# Patient Record
Sex: MALE | Race: OTHER | HISPANIC OR LATINO | URBAN - METROPOLITAN AREA
[De-identification: names, ages, dates, MRNs, and addresses within clinical notes are randomized per-mention and may not be internally consistent; named-entity substitution may affect disease eponyms.]

---

## 2019-04-04 ENCOUNTER — EMERGENCY (EMERGENCY)
Facility: HOSPITAL | Age: 64
LOS: 1 days | Discharge: HOME | End: 2019-04-04
Attending: EMERGENCY MEDICINE
Payer: SELF-PAY

## 2019-04-04 VITALS
TEMPERATURE: 99 F | RESPIRATION RATE: 20 BRPM | HEART RATE: 94 BPM | HEIGHT: 66.93 IN | DIASTOLIC BLOOD PRESSURE: 89 MMHG | OXYGEN SATURATION: 95 % | WEIGHT: 149.91 LBS | SYSTOLIC BLOOD PRESSURE: 144 MMHG

## 2019-04-04 PROCEDURE — 99283 EMERGENCY DEPT VISIT LOW MDM: CPT | Mod: 25

## 2019-04-04 PROCEDURE — 99053 MED SERV 10PM-8AM 24 HR FAC: CPT

## 2019-04-04 NOTE — ED ADULT TRIAGE NOTE - CHIEF COMPLAINT QUOTE
patient states having cramping type pain to bilat arms legs and back and headache that started 2 months ago but states pain worse tonight

## 2019-04-05 VITALS
HEART RATE: 76 BPM | OXYGEN SATURATION: 96 % | SYSTOLIC BLOOD PRESSURE: 132 MMHG | RESPIRATION RATE: 18 BRPM | TEMPERATURE: 98 F | DIASTOLIC BLOOD PRESSURE: 82 MMHG

## 2019-04-05 PROCEDURE — T1013: CPT

## 2019-04-05 PROCEDURE — 99283 EMERGENCY DEPT VISIT LOW MDM: CPT

## 2019-04-05 RX ORDER — IBUPROFEN 200 MG
600 TABLET ORAL ONCE
Qty: 0 | Refills: 0 | Status: COMPLETED | OUTPATIENT
Start: 2019-04-05 | End: 2019-04-05

## 2019-04-05 RX ORDER — ACETAMINOPHEN 500 MG
975 TABLET ORAL ONCE
Qty: 0 | Refills: 0 | Status: COMPLETED | OUTPATIENT
Start: 2019-04-05 | End: 2019-04-05

## 2019-04-05 RX ADMIN — Medication 600 MILLIGRAM(S): at 02:16

## 2019-04-05 RX ADMIN — Medication 975 MILLIGRAM(S): at 02:16

## 2019-04-05 NOTE — ED ADULT NURSE NOTE - OBJECTIVE STATEMENT
assumed care of pt in room. family at bedside. pt a&ox3. pt reports feeling flu like symptoms for past 2 days. pt reports having body aches and pains for past 2 months in hands, arms, head. cough x2 days. pt reports taking tylenol/motrin at home for pains. has not seen doctor for pains or symptoms. denies nausea, vomiting, diarrhea, fever. had flu shot this year.

## 2019-04-05 NOTE — ED PROVIDER NOTE - ATTENDING CONTRIBUTION TO CARE
63-year-old malePresents to emergency room with complaints of cough congestion, BilateralHand pain patient is a . Plan to check vitals no evidence of fever , Retractions.Most likely viral infection in nature plan to educate patient encourage by mouth fluidsFollow-up with primary care physician.

## 2019-04-05 NOTE — ED PROVIDER NOTE - OBJECTIVE STATEMENT
63 year old male with generalized body aches pressure headache and cough x 2 days as well as bilateral hand pain x 2 months. previously worked in construction. dry cough no cp or sob. nonsmoker. got flu shot this year. minimal relief of symptoms with tylenol and advil at home last taken earlier in the day  denies accidents or injuries recent sick contacts or travel. no hemoptysis.

## 2019-04-05 NOTE — ED PROVIDER NOTE - PROGRESS NOTE DETAILS
educated on flu like symptoms, conservative management and proper fu   as well as medication for arthritis.   given [facility] for fu

## 2019-04-05 NOTE — ED ADULT NURSE NOTE - NSIMPLEMENTINTERV_GEN_ALL_ED
Implemented All Universal Safety Interventions:  [place] to call system. Call bell, personal items and telephone within reach. Instruct patient to call for assistance. Room bathroom lighting operational. Non-slip footwear when patient is off stretcher. Physically safe environment: no spills, clutter or unnecessary equipment. Stretcher in lowest position, wheels locked, appropriate side rails in place.

## 2019-04-05 NOTE — ED PROVIDER NOTE - MUSCULOSKELETAL, MLM
Spine appears normal no midline pt vertebral tenderness or step offs., range of motion is not limited, . BILATERAL HANDS/ Wrists: no apparent deformity swelling or erythema. FROM of all join spaces. sensation intact. good opposition and finger strength 2+ radial pulses. brisk cap refill.

## 2020-11-06 ENCOUNTER — EMERGENCY (EMERGENCY)
Facility: HOSPITAL | Age: 65
LOS: 1 days | Discharge: HOME | End: 2020-11-06
Attending: EMERGENCY MEDICINE
Payer: COMMERCIAL

## 2020-11-06 VITALS
RESPIRATION RATE: 22 BRPM | HEIGHT: 66.93 IN | HEART RATE: 70 BPM | TEMPERATURE: 98 F | DIASTOLIC BLOOD PRESSURE: 101 MMHG | WEIGHT: 151.9 LBS | SYSTOLIC BLOOD PRESSURE: 172 MMHG | OXYGEN SATURATION: 96 %

## 2020-11-06 LAB
ALBUMIN SERPL ELPH-MCNC: 4.2 G/DL — SIGNIFICANT CHANGE UP (ref 3.3–5.2)
ALP SERPL-CCNC: 54 U/L — SIGNIFICANT CHANGE UP (ref 40–120)
ALT FLD-CCNC: 29 U/L — SIGNIFICANT CHANGE UP
ANION GAP SERPL CALC-SCNC: 11 MMOL/L — SIGNIFICANT CHANGE UP (ref 5–17)
APTT BLD: 24.6 SEC — LOW (ref 27.5–35.5)
AST SERPL-CCNC: 20 U/L — SIGNIFICANT CHANGE UP
BILIRUB SERPL-MCNC: 0.7 MG/DL — SIGNIFICANT CHANGE UP (ref 0.4–2)
BLD GP AB SCN SERPL QL: SIGNIFICANT CHANGE UP
BUN SERPL-MCNC: 22 MG/DL — HIGH (ref 8–20)
CALCIUM SERPL-MCNC: 8.9 MG/DL — SIGNIFICANT CHANGE UP (ref 8.6–10.2)
CHLORIDE SERPL-SCNC: 103 MMOL/L — SIGNIFICANT CHANGE UP (ref 98–107)
CO2 SERPL-SCNC: 26 MMOL/L — SIGNIFICANT CHANGE UP (ref 22–29)
CREAT SERPL-MCNC: 0.6 MG/DL — SIGNIFICANT CHANGE UP (ref 0.5–1.3)
GLUCOSE SERPL-MCNC: 116 MG/DL — HIGH (ref 70–99)
HCT VFR BLD CALC: 42.1 % — SIGNIFICANT CHANGE UP (ref 39–50)
HGB BLD-MCNC: 14.8 G/DL — SIGNIFICANT CHANGE UP (ref 13–17)
INR BLD: 0.96 RATIO — SIGNIFICANT CHANGE UP (ref 0.88–1.16)
MCHC RBC-ENTMCNC: 30.8 PG — SIGNIFICANT CHANGE UP (ref 27–34)
MCHC RBC-ENTMCNC: 35.2 GM/DL — SIGNIFICANT CHANGE UP (ref 32–36)
MCV RBC AUTO: 87.5 FL — SIGNIFICANT CHANGE UP (ref 80–100)
PLATELET # BLD AUTO: 165 K/UL — SIGNIFICANT CHANGE UP (ref 150–400)
POTASSIUM SERPL-MCNC: 4 MMOL/L — SIGNIFICANT CHANGE UP (ref 3.5–5.3)
POTASSIUM SERPL-SCNC: 4 MMOL/L — SIGNIFICANT CHANGE UP (ref 3.5–5.3)
PROT SERPL-MCNC: 6.7 G/DL — SIGNIFICANT CHANGE UP (ref 6.6–8.7)
PROTHROM AB SERPL-ACNC: 11.2 SEC — SIGNIFICANT CHANGE UP (ref 10.6–13.6)
RBC # BLD: 4.81 M/UL — SIGNIFICANT CHANGE UP (ref 4.2–5.8)
RBC # FLD: 13.2 % — SIGNIFICANT CHANGE UP (ref 10.3–14.5)
SODIUM SERPL-SCNC: 139 MMOL/L — SIGNIFICANT CHANGE UP (ref 135–145)
WBC # BLD: 11.7 K/UL — HIGH (ref 3.8–10.5)
WBC # FLD AUTO: 11.7 K/UL — HIGH (ref 3.8–10.5)

## 2020-11-06 PROCEDURE — 71046 X-RAY EXAM CHEST 2 VIEWS: CPT | Mod: 26

## 2020-11-06 PROCEDURE — 70450 CT HEAD/BRAIN W/O DYE: CPT | Mod: 26

## 2020-11-06 PROCEDURE — 99218: CPT

## 2020-11-06 PROCEDURE — 99283 EMERGENCY DEPT VISIT LOW MDM: CPT

## 2020-11-06 RX ORDER — AMLODIPINE BESYLATE 2.5 MG/1
5 TABLET ORAL ONCE
Refills: 0 | Status: COMPLETED | OUTPATIENT
Start: 2020-11-06 | End: 2020-11-06

## 2020-11-06 RX ORDER — CYCLOBENZAPRINE HYDROCHLORIDE 10 MG/1
10 TABLET, FILM COATED ORAL ONCE
Refills: 0 | Status: COMPLETED | OUTPATIENT
Start: 2020-11-06 | End: 2020-11-06

## 2020-11-06 RX ORDER — ACETAMINOPHEN 500 MG
650 TABLET ORAL EVERY 6 HOURS
Refills: 0 | Status: DISCONTINUED | OUTPATIENT
Start: 2020-11-06 | End: 2020-11-11

## 2020-11-06 RX ADMIN — CYCLOBENZAPRINE HYDROCHLORIDE 10 MILLIGRAM(S): 10 TABLET, FILM COATED ORAL at 23:05

## 2020-11-06 RX ADMIN — AMLODIPINE BESYLATE 5 MILLIGRAM(S): 2.5 TABLET ORAL at 22:52
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
## 2020-11-06 NOTE — ED PROVIDER NOTE - PROGRESS NOTE DETAILS
Accompanied by  Kady, Pt was given his CT results. It was explained to the a patient the 7mm lesion may be consistent with a cavernoma, benign lesion, hemorrhage, or metastatic lesion. Results were discussed with the son in the room with the approval of the pt. Pt agreed they woukld like to stay for the MRI.
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
## 2020-11-07 PROBLEM — Z78.9 OTHER SPECIFIED HEALTH STATUS: Chronic | Status: ACTIVE | Noted: 2019-04-05

## 2020-11-07 PROCEDURE — 70498 CT ANGIOGRAPHY NECK: CPT | Mod: 26

## 2020-11-07 PROCEDURE — 99226: CPT

## 2020-11-07 PROCEDURE — 99283 EMERGENCY DEPT VISIT LOW MDM: CPT

## 2020-11-07 PROCEDURE — 70496 CT ANGIOGRAPHY HEAD: CPT | Mod: 26

## 2020-11-07 PROCEDURE — 70551 MRI BRAIN STEM W/O DYE: CPT | Mod: 26

## 2020-11-07 RX ORDER — AMLODIPINE BESYLATE 2.5 MG/1
5 TABLET ORAL
Refills: 0 | Status: DISCONTINUED | OUTPATIENT
Start: 2020-11-07 | End: 2020-11-11

## 2020-11-07 RX ADMIN — Medication 650 MILLIGRAM(S): at 21:34

## 2020-11-07 RX ADMIN — Medication 650 MILLIGRAM(S): at 23:19
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
## 2020-11-07 NOTE — CONSULT NOTE ADULT - ATTENDING COMMENTS
NSGY Attg:    see above    patient seen and examined by PA staff    CT and MRI reviewed    suspect likely cavernoma, although official report of MRI pending    agree with plan as above
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
## 2020-11-08 VITALS
HEART RATE: 72 BPM | DIASTOLIC BLOOD PRESSURE: 82 MMHG | RESPIRATION RATE: 18 BRPM | OXYGEN SATURATION: 97 % | SYSTOLIC BLOOD PRESSURE: 134 MMHG | TEMPERATURE: 98 F

## 2020-11-08 PROCEDURE — 86850 RBC ANTIBODY SCREEN: CPT

## 2020-11-08 PROCEDURE — 70551 MRI BRAIN STEM W/O DYE: CPT

## 2020-11-08 PROCEDURE — 70553 MRI BRAIN STEM W/O & W/DYE: CPT

## 2020-11-08 PROCEDURE — 85027 COMPLETE CBC AUTOMATED: CPT

## 2020-11-08 PROCEDURE — T1013: CPT

## 2020-11-08 PROCEDURE — 70553 MRI BRAIN STEM W/O & W/DYE: CPT | Mod: 26

## 2020-11-08 PROCEDURE — 36415 COLL VENOUS BLD VENIPUNCTURE: CPT

## 2020-11-08 PROCEDURE — 71046 X-RAY EXAM CHEST 2 VIEWS: CPT

## 2020-11-08 PROCEDURE — G0378: CPT

## 2020-11-08 PROCEDURE — 99284 EMERGENCY DEPT VISIT MOD MDM: CPT

## 2020-11-08 PROCEDURE — 99217: CPT

## 2020-11-08 PROCEDURE — 80053 COMPREHEN METABOLIC PANEL: CPT

## 2020-11-08 PROCEDURE — 70450 CT HEAD/BRAIN W/O DYE: CPT

## 2020-11-08 PROCEDURE — 85730 THROMBOPLASTIN TIME PARTIAL: CPT

## 2020-11-08 PROCEDURE — 99282 EMERGENCY DEPT VISIT SF MDM: CPT

## 2020-11-08 PROCEDURE — 86901 BLOOD TYPING SEROLOGIC RH(D): CPT

## 2020-11-08 PROCEDURE — 70498 CT ANGIOGRAPHY NECK: CPT

## 2020-11-08 PROCEDURE — 86900 BLOOD TYPING SEROLOGIC ABO: CPT

## 2020-11-08 PROCEDURE — 85610 PROTHROMBIN TIME: CPT

## 2020-11-08 PROCEDURE — 70496 CT ANGIOGRAPHY HEAD: CPT

## 2020-11-08 RX ORDER — AMLODIPINE BESYLATE 2.5 MG/1
1 TABLET ORAL
Qty: 30 | Refills: 0
Start: 2020-11-08 | End: 2020-12-07

## 2020-11-08 RX ADMIN — Medication 650 MILLIGRAM(S): at 16:56

## 2020-11-08 RX ADMIN — Medication 650 MILLIGRAM(S): at 18:17
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
## 2020-11-08 NOTE — ED ADULT NURSE REASSESSMENT NOTE - NEURO MENTATION
-- DO NOT REPLY / DO NOT REPLY ALL --  -- Message is from the [facility]--    COVID-19 Universal Screening: Negative    General Patient Message      Reason for Call: The patient is calling for Dr. Harvey, in regards to her legs still being swollen. The patient would like a call back as soon as possible.    Caller Information       Type Contact Phone    04/10/2020 08:14 AM Phone (Incoming) Ailin Montilla (Self) [phone] (M)          Alternative phone number: none    Turnaround time given to caller:   \"This message will be sent to [state Provider's name]. The clinical team will fulfill your request as soon as they review your message.\"    
Patient states her legs are still swollen. Noted she had a  visit on 3/23, but \"provider didn't state much\" per patient. She wishes to speak with  to address her concerns, telephone visit scheduled.   
normal
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
## 2020-11-08 NOTE — ED ADULT NURSE REASSESSMENT NOTE - NIH STROKE SCALE: 1B. LOC QUESTIONS, QM
(0) Answers both questions correctly

## 2020-11-08 NOTE — ED ADULT NURSE REASSESSMENT NOTE - NIH STROKE SCALE: 6B. MOTOR LEG, RIGHT, QM
(0) No drift; leg holds 30 degree position for full 5 secs
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
## 2020-11-08 NOTE — ED CDU PROVIDER SUBSEQUENT DAY NOTE - HEME LYMPH, MLM
No adenopathy or splenomegaly. No cervical or inguinal lymphadenopathy.
No adenopathy or splenomegaly. No cervical or inguinal lymphadenopathy.

## 2020-11-08 NOTE — ED CDU PROVIDER SUBSEQUENT DAY NOTE - MUSCULOSKELETAL, MLM
Spine appears normal, range of motion is not limited, no muscle or joint tenderness
Spine appears normal, No mid line spine at C/T/L not TTP , ROm of the cervical grossly intact

## 2020-11-08 NOTE — ED ADULT NURSE REASSESSMENT NOTE - COMFORT CARE
wait time explained/plan of care explained/ambulated to bathroom/po fluids offered/meal provided
wait time explained/plan of care explained/po fluids offered/meal provided/ambulated to bathroom
wait time explained/po fluids offered/meal provided/plan of care explained
wait time explained/ambulated to bathroom/plan of care explained/po fluids offered

## 2020-11-08 NOTE — ED ADULT NURSE REASSESSMENT NOTE - NIH STROKE SCALE: 5A. MOTOR ARM, LEFT, QM
(0) No drift; limb holds 90 (or 45) degrees for full 10 secs

## 2020-11-08 NOTE — ED CDU PROVIDER SUBSEQUENT DAY NOTE - PSYCHIATRIC, MLM
Alert and oriented to person, place, time/situation. normal mood and affect. no apparent risk to self or others.
Alert and oriented to person, place, time/situation. normal mood and affect. no apparent risk to self or others.

## 2020-11-08 NOTE — ED ADULT NURSE REASSESSMENT NOTE - NS ED NURSE REASSESS COMMENT FT1
Patient recd this morning A/Ox3, VSS, denies any sort of pain, discussed plan of care with patient, will continue to monitor.
Pt sleeping comfortably in bed will continue to monitor.
pt resting comfortably in stretcher with no complaints at this time.  Safety maintained, POC explained, pt verbalized understanding.
Received report from GI Gray. Assumed care of pt at 1030 hours. Pt moved to CDU for observation.
Assumed care of the Pt at 1930. Verbal report received from GI Marsh. Pt resting comfortably in bed in AOX4 in no acute distress.  called to bedside for assistance. Pt denies chest pain and SOB. Pt complaining of headache, Meds to be given as per orders. Pt with no neurological deficits NIH of 0. Pt pending MRI. Pt with no further questions for RN. Wait time explained plan of care explained will continue to monitor.

## 2020-11-08 NOTE — ED CDU PROVIDER DISPOSITION NOTE - ATTENDING CONTRIBUTION TO CARE
MR does not reveal any acute findings of clinical significane  safe to discharge with outpt follow up
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
## 2020-11-08 NOTE — ED ADULT NURSE REASSESSMENT NOTE - NIH STROKE SCALE: 3. VISUAL, QM
(0) No visual loss

## 2020-11-08 NOTE — PROGRESS NOTE ADULT - ATTENDING COMMENTS
NSGY Attg:    see above    patient seen and examined    agree with exam as above    MRI reviewed and case d/w Dr. Rollins    no acute neurosurgical intervention and no role for angio

## 2020-11-08 NOTE — ED ADULT NURSE REASSESSMENT NOTE - ANCILLARY STATUS
MRI/awaiting radiology
MRI/awaiting radiology
physician at bedside/ and MD at bedside updating patient and family member of plan of care  / answering all questions and concerns to patient's satisfaction/awaiting radiology
awaiting radiology/MRI

## 2020-11-08 NOTE — ED ADULT NURSE REASSESSMENT NOTE - GENERAL PATIENT STATE
comfortable appearance/cooperative/resting/sleeping/smiling/interactive
comfortable appearance/cooperative/resting/sleeping/smiling/interactive
resting/sleeping/family/SO at bedside/smiling/interactive/comfortable appearance/cooperative
smiling/interactive/comfortable appearance/cooperative

## 2020-11-08 NOTE — ED CDU PROVIDER DISPOSITION NOTE - PATIENT PORTAL LINK FT
You can access the FollowMyHealth Patient Portal offered by [facility] by registering at the following website: [email or web address]. By joining [facility]’s FollowMyHealth portal, you will also be able to view your health information using other applications (apps) compatible with our system.

## 2020-11-08 NOTE — ED ADULT NURSE REASSESSMENT NOTE - NIH STROKE SCALE: 8. SENSORY, QM
(0) Normal; no sensory loss

## 2020-11-08 NOTE — ED ADULT NURSE REASSESSMENT NOTE - NSIMPLEMENTINTERV_GEN_ALL_ED
Implemented All Universal Safety Interventions:  [place] to call system. Call bell, personal items and telephone within reach. Instruct patient to call for assistance. Room bathroom lighting operational. Non-slip footwear when patient is off stretcher. Physically safe environment: no spills, clutter or unnecessary equipment. Stretcher in lowest position, wheels locked, appropriate side rails in place.
Implemented All Universal Safety Interventions:  [place] to call system. Call bell, personal items and telephone within reach. Instruct patient to call for assistance. Room bathroom lighting operational. Non-slip footwear when patient is off stretcher. Physically safe environment: no spills, clutter or unnecessary equipment. Stretcher in lowest position, wheels locked, appropriate side rails in place.

## 2020-11-08 NOTE — ED ADULT NURSE REASSESSMENT NOTE - NIH STROKE SCALE: 4. FACIAL PALSY, QM
(0) Normal symmetrical movements

## 2020-11-08 NOTE — ED ADULT NURSE REASSESSMENT NOTE - NIH STROKE SCALE: 1A. LEVEL OF CONSCIOUSNESS, QM
(0) Alert; keenly responsive
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
## 2020-11-08 NOTE — ED ADULT NURSE REASSESSMENT NOTE - NIH STROKE SCALE: 1C. LOC COMMANDS, QM
(0) Performs both tasks correctly

## 2020-11-08 NOTE — ED ADULT NURSE REASSESSMENT NOTE - NURSING NEURO LEVEL OF CONSCIOUSNESS
alert and awake/follows commands
alert and awake/follows commands
follows commands/alert and awake
follows commands/alert and awake
alert and awake/follows commands
follows commands/alert and awake

## 2021-10-24 ENCOUNTER — EMERGENCY (EMERGENCY)
Facility: HOSPITAL | Age: 66
LOS: 1 days | Discharge: HOME | End: 2021-10-24
Attending: EMERGENCY MEDICINE
Payer: SELF-PAY

## 2021-10-24 VITALS
DIASTOLIC BLOOD PRESSURE: 92 MMHG | OXYGEN SATURATION: 96 % | SYSTOLIC BLOOD PRESSURE: 151 MMHG | RESPIRATION RATE: 18 BRPM | HEART RATE: 77 BPM

## 2021-10-24 VITALS
OXYGEN SATURATION: 97 % | TEMPERATURE: 98 F | DIASTOLIC BLOOD PRESSURE: 94 MMHG | SYSTOLIC BLOOD PRESSURE: 154 MMHG | WEIGHT: 164.91 LBS | HEART RATE: 75 BPM | RESPIRATION RATE: 16 BRPM | HEIGHT: 66.93 IN

## 2021-10-24 PROCEDURE — 99283 EMERGENCY DEPT VISIT LOW MDM: CPT | Mod: 25

## 2021-10-24 PROCEDURE — 99284 EMERGENCY DEPT VISIT MOD MDM: CPT

## 2021-10-24 PROCEDURE — 93010 ELECTROCARDIOGRAM REPORT: CPT

## 2021-10-24 PROCEDURE — 71045 X-RAY EXAM CHEST 1 VIEW: CPT

## 2021-10-24 PROCEDURE — 71045 X-RAY EXAM CHEST 1 VIEW: CPT | Mod: 26

## 2021-10-24 PROCEDURE — 93005 ELECTROCARDIOGRAM TRACING: CPT

## 2021-10-24 RX ORDER — ACETAMINOPHEN 500 MG
975 TABLET ORAL ONCE
Refills: 0 | Status: COMPLETED | OUTPATIENT
Start: 2021-10-24 | End: 2021-10-24

## 2021-10-24 RX ADMIN — Medication 975 MILLIGRAM(S): at 18:22
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
## 2021-10-24 NOTE — ED PROVIDER NOTE - PROGRESS NOTE DETAILS
[place] ECHEVARRIA: patient feeling better after tylenol, currently asymptomatic. Does not want to wait for CT. Currently FROM of neck, no cervical ttp. CT cancelled. Return precautions given.
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
## 2021-10-24 NOTE — ED PROVIDER NOTE - PATIENT PORTAL LINK FT
You can access the FollowMyHealth Patient Portal offered by [facility] by registering at the following website: [email or web address]. By joining [facility]’s FollowMyHealth portal, you will also be able to view your health information using other applications (apps) compatible with our system.

## 2021-10-24 NOTE — ED PROVIDER NOTE - PHYSICAL EXAMINATION
Const: Awake, alert and oriented. In no acute distress. Well appearing.  HEENT: NC/AT. Moist mucous membranes.  Eyes: No scleral icterus. EOMI.  Neck:. Cervical spine tenderness to palpation. C-collar in place.   Cardiac: Regular rate and regular rhythm. +S1/S2. Peripheral pulses 2+ and symmetric. No LE edema.  Resp: Speaking in full sentences. No evidence of respiratory distress. No wheezes, rales or rhonchi.  Abd: Soft, non-tender, non-distended. Normal bowel sounds in all 4 quadrants. No guarding or rebound.  Back: Spine midline and non-tender. No CVAT.  Skin: No rashes, abrasions or lacerations.  Lymph: No cervical lymphadenopathy.  Neuro: Awake, alert & oriented x 3. Moves all extremities symmetrically.
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
## 2023-06-07 NOTE — ED PROVIDER NOTE - PMH
No pertinent past medical history <<----- Click to add NO pertinent Past Medical History Hatchet Flap Text: The defect edges were debeveled with a #15 scalpel blade. Given the location of the defect, shape of the defect and the proximity to free margins a hatchet flap was deemed most appropriate. Using a sterile surgical marker, an appropriate hatchet flap was drawn incorporating the defect and placing the expected incisions within the relaxed skin tension lines where possible. The area thus outlined was incised deep to adipose tissue with a #15 scalpel blade. The skin margins were undermined to an appropriate distance in all directions utilizing iris scissors. Following this, the designed flap was carried over into the primary defect and sutured into place.

## 2023-07-03 NOTE — ED CDU PROVIDER DISPOSITION NOTE - NSFOLLOWUPINSTRUCTIONS_ED_ALL_ED_FT
Addended by: GAVIN FOURNIER on: 7/3/2023 02:43 PM     Modules accepted: Orders     Please follow up with neurology

## 2023-08-23 NOTE — ED ADULT NURSE REASSESSMENT NOTE - NURSING NEURO ORIENTATION
oriented to person, place and time
Two to three times per week

## 2023-08-30 NOTE — ED ADULT NURSE REASSESSMENT NOTE - NSFALLRSKOUTCOME_ED_ALL_ED
OUTPATIENT PSYCHIATRY FOLLOW-UP VISIT     PATIENT:  Laura Cedeno  MRN:  [id number]  :  1946  DATE OF SERVICE:  2023    Pt last seen 23, during which the following was done:  -continue Pristiq 150mg PO QAM for mood/anxiety  -continue gabapentin 300mg PO Qday PRN anxiety, insomnia  -continue trazodone 50-100mg PO QHS for sleep    SUBJECTIVE  Since last visit, patient states she had trouble sleeping last night and is feeling fatigued, generally sleeps well but today woke up very early. Reports her mood is \"blah.\" Going to visit daughter in [place] tomorrow and has to pack. Decided to increase vitamin D to 2000 IU since her recent labs showed slightly low vitamin D. Remains adherent with Pristiq, gabapentin, trazodone with no noted adverse effects. Psychiatric hx, family psychiatric hx, social hx, and substance use hx reviewed and updated if necessary.      Psychiatric Hx:  Diagnoses: depression, anxiety  Treatment: last outpatient provider was Dr. 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 
100 MG tablet TAKE 1/2 TO 1 TABLET       NIGHTLY   • ezetimibe (ZETIA) 10 MG tablet TAKE 1 TABLET AT BEDTIME   • desvenlafaxine (PRISTIQ) 50 MG 24 hr tablet Take 1 tablet by mouth daily. (with 100mg tablet for a total daily dose of 150mg)   • busPIRone (BUSPAR) 10 MG tablet Take 0.5 tablet twice daily for 7 days, then increase to 1 tablet twice daily.   • desvenlafaxine 50 MG extended-release tablet Take 1 tablet by mouth daily.   • amLODIPine (NORVASC) 2.5 MG tablet Take 1 tablet by mouth at bedtime. Take 5 mg AM and 2.5 mg PM   • amLODIPine (NORVASC) 5 MG tablet Take 5 mg AM   • desvenlafaxine (PRISTIQ) 100 MG 24 hr tablet Take 200 mg by mouth daily.   • atorvastatin (LIPITOR) 10 MG tablet TAKE 1 TABLET DAILY   • gabapentin (NEURONTIN) 300 MG capsule Take 1 tablet twice a day as needed for anxiety, insomnia.   • losartan (COZAAR) 50 MG tablet TAKE 1 TABLET TWICE A DAY   • sumatriptan (IMITREX) 100 MG tablet Take 1 tablet by mouth as needed for Migraine. Take 1 tablet by mouth at onset of migraine. May repeat after 2 hours if needed.   • aspirin (Aspirin Childrens) 81 MG chewable tablet Chew 1 tablet by mouth daily.   • Calcium Carbonate Antacid (CALCIUM CARBONATE PO) Take 1 tablet by mouth daily.   • ZINC SULFATE PO Take by mouth as needed.   • SUMAtriptan (IMITREX STATDOSE) 6 MG/0.5ML auto-injector INJECT 0.5ML SUBCUTANEOUSLYAS NEEDED FOR MIGRAINE   • Multiple Vitamins-Minerals (MULTIVITAL) tablet Take by mouth daily.   • cholecalciferol (VITAMIN D3) 1000 UNITS tablet Take 1,000 Units by mouth daily.     No current facility-administered medications for this visit.        Allergies:  ALLERGIES:   Allergen Reactions   • Codeine CARDIAC DISTURBANCES and SHORTNESS OF BREATH   • No Name Available Other (See Comments)     Codeine Sulfate TABS  got shallow breathing, no rash, no er visit and no trouble breathing  states breathing affected       MENTAL STATUS EXAM  Appearance: Appears stated age, dressed in casual 
clothes, good grooming/hygiene.  Behavior: Cooperative, good eye contact. Mild psychomotor agitation, fidgety.  Speech: Regular rate and rhythm, normal tone, non-pressured.  Mood: \"Blah\"  Affect: Euthymic, normal range, non-labile.  Thought Process: Linear, logical, goal-directed.  Thought Content: Denies SI/HI/obsessions/paranoid or delusion ideation. No evidence of response to internal to stimuli. Denies AVH.  General Cognition: A&Ox3, good concentration, memory intact.  Insight/Judgment: Good/good      ASSESSMENT  Laura Cedeno is a 77 year old female with PMHx significant for HTN, migraines and PPHx significant for anxiety and depression contacted for routine follow up and medication management. Biologically, patient reports family psychiatric history of likely undiagnosed depression in parents and depression and daughter. Psychologically, patient initially endorsed increased symptoms of anxiety and depression including ruminative thoughts, decreased energy and amotivation. Socially, pt with two children that are supportive, retired in September 2019 from working at the [employer], now working a  part-time. No acute safety concerns at this time. Plan is to increase Pristiq dosage for lingering depressive symptoms, pt in agreement with plan.      Diagnosis:  MDD, recurrent, in partial remission  NIDIA    PLAN  -increase Pristiq 150mg to 200mg PO QAM for mood/anxiety  -continue gabapentin 300mg PO Qday PRN anxiety, insomnia  -continue trazodone 50-100mg PO QHS for sleep  -discussed the benefits/risks/alternatives/side effects of treatment plan with patient  -RTC in 4-6 weeks, or before if needed    Светлана Armas MD  [facility]      
Universal Safety Interventions
Universal Safety Interventions

## 2023-09-19 NOTE — ED CDU PROVIDER INITIAL DAY NOTE - LANGUAGE ASSISTANCE NEEDED
Pt will perform toilet transfers with rolling walker/commode independently in 1 week Yes-Patient/Caregiver accepts free interpretation services...

## 2023-10-08 NOTE — ED ADULT NURSE REASSESSMENT NOTE - URINE CHARACTERISTICS
Wt Readings from Last 3 Encounters:   10/07/23 91.2 kg (201 lb 1 oz)   06/19/23 86.6 kg (191 lb)   05/23/23 97.2 kg (214 lb 3.2 oz)     · On admission she had worsening bilateral lower extremity edema and edema in right upper extremity  · RUE doppler negative for DVT   · BNP slightly elevated at 107  · CTA chest negative for PE or dissection, chest x-ray revealing pulmonary vascular congestion  · Echocardiogram from April 2023 revealing 60 to 65% ejection fraction with grade 1 diastolic dysfunction  · She was initially started on IV Lasix 100 mg 3 times daily on admission per CHF powerplan -- had a increase in Cr so diuresis held   · Cardiology following   · Hold diuresis she appears euvolemic to dry on exam (giving additional  500 cc fluids 2/2 CLAUDIA)
clear
clear

## 2024-02-23 NOTE — ED CDU PROVIDER SUBSEQUENT DAY NOTE - TEMPLATE, MLM
Patient presented to unit for 2 units PRBCs. VSS. No complaints voiced. Pre-medications refused by patient. PRBCs infused and tolerated without difficulty. Patient accompanied by , ambulatory and in NAD at time of discharge.    Problem: Anemia  Goal: Anemia Symptom Improvement  Outcome: Ongoing, Progressing      Ankle Injury Bites

## 2024-06-05 NOTE — ED ADULT NURSE NOTE - PRO INTERPRETER NEED 2
Left voicemail informing patient that orders had been faxed to [facility].     AGUEDA Irizarry RN     Iranian

## 2024-07-16 ENCOUNTER — EMERGENCY (EMERGENCY)
Facility: HOSPITAL | Age: 69
LOS: 1 days | Discharge: HOME | End: 2024-07-16
Attending: EMERGENCY MEDICINE
Payer: MEDICAID

## 2024-07-16 VITALS
SYSTOLIC BLOOD PRESSURE: 130 MMHG | OXYGEN SATURATION: 96 % | WEIGHT: 150.13 LBS | RESPIRATION RATE: 20 BRPM | DIASTOLIC BLOOD PRESSURE: 74 MMHG | HEART RATE: 85 BPM | TEMPERATURE: 98 F

## 2024-07-16 PROCEDURE — 71250 CT THORAX DX C-: CPT | Mod: MC

## 2024-07-16 PROCEDURE — 99284 EMERGENCY DEPT VISIT MOD MDM: CPT

## 2024-07-16 PROCEDURE — T1013: CPT

## 2024-07-16 PROCEDURE — 71046 X-RAY EXAM CHEST 2 VIEWS: CPT

## 2024-07-16 PROCEDURE — 94640 AIRWAY INHALATION TREATMENT: CPT

## 2024-07-16 PROCEDURE — 71046 X-RAY EXAM CHEST 2 VIEWS: CPT | Mod: 26

## 2024-07-16 PROCEDURE — 99284 EMERGENCY DEPT VISIT MOD MDM: CPT | Mod: 25

## 2024-07-16 RX ORDER — ALBUTEROL 90 MCG
2 AEROSOL REFILL (GRAM) INHALATION EVERY 6 HOURS
Refills: 0 | Status: DISCONTINUED | OUTPATIENT
Start: 2024-07-16 | End: 2024-07-24

## 2024-07-16 RX ORDER — ACETAMINOPHEN 325 MG
650 TABLET ORAL ONCE
Refills: 0 | Status: COMPLETED | OUTPATIENT
Start: 2024-07-16 | End: 2024-07-16

## 2024-07-16 RX ORDER — BENZONATATE 100 MG/1
100 TABLET ORAL ONCE
Refills: 0 | Status: COMPLETED | OUTPATIENT
Start: 2024-07-16 | End: 2024-07-16

## 2024-07-16 RX ADMIN — Medication 650 MILLIGRAM(S): at 20:58

## 2024-07-16 RX ADMIN — Medication 600 MILLIGRAM(S): at 20:58

## 2024-07-16 RX ADMIN — Medication 2 PUFF(S): at 20:58

## 2024-07-16 RX ADMIN — BENZONATATE 100 MILLIGRAM(S): 100 TABLET ORAL at 20:58

## 2024-07-16 NOTE — ED PROVIDER NOTE - PHYSICAL EXAMINATION
General: In NAD, non-toxic/well-appearing.  Skin: No rashes or lesions. Warm, dry, color normal for race.   Head: Normocephalic/atraumatic.   Eyes: Sclera anicteric, conjunctivae clear b/l. PERRLA, EOMI.   Neck: Supple, FROM.   Cardio: Rate and rhythm regular. No audible murmur.  Resp: Speaking in full sentences. No visible nasal flaring, retractions, or deformity. Breath sounds vesicular, symmetrical and without rales, rhonchi or wheezing b/l.  Abd: Non-distended. Soft, non-tender. No rebound, guarding.   MSK: MAEx4. FROM.   Neuro: A&Ox3. Appears nonfocal.

## 2024-07-16 NOTE — ED PROVIDER NOTE - NSFOLLOWUPINSTRUCTIONS_ED_ALL_ED_FT
- Prescription sent to pharmacy.  - Ibuprofen 600mg every 6 hours as needed for pain.  - Acetaminophen 650mg every 6 hours as needed for pain.  - Please bring all documentation from your ED visit to any related future follow up appointment.  - Please call to schedule follow up appointment with your primary care physician within 24-48 hours.  - Please seek immediate medical attention or return to the ED for any new/worsening, signs/symptoms, or concerns.    Feel better!     - Receta enviada a farmacia.  - Ibuprofeno 600mg cada 6 horas según necesidad para el dolor.  - Acetaminofén 650 mg cada 6 horas según sea necesario para el dolor.  - Traiga toda la documentación de marquez visita al ED a cualquier faye de seguimiento futura relacionada.  - Llame para programar magno faye de seguimiento con marquez médico de atención primaria dentro de las 24 a 48 horas.  - Busque atención médica inmediata o regrese al servicio de urgencias por cualquier signo/síntoma o inquietud nuevos/empeorados.    ¡Sentirse mejor!        Enfermedades virales en los adultos    Viral Illness, Adult      Los virus son microbios diminutos que entran en el organismo de magno persona y causan enfermedades. Hay muchos tipos de virus diferentes y causan muchas clases de enfermedades. Las enfermedades virales pueden ser leves o graves. Pueden afectar diferentes partes del cuerpo.    Entre las afecciones a corto plazo causadas por virus, se incluyen los resfríos y la gripe. Entre las afecciones a stefan plazo causadas por un virus, incluyen el herpes, la culebrilla y la infección por VIH (virus de inmunodeficiencia humana). Se conley identificado unos pocos virus asociados con determinados tipos de cáncer.      ¿Cuáles son las causas?    Muchos tipos de virus pueden causar enfermedades. Los virus invaden las células del organismo, se multiplican y provocan que las células infectadas funcionen de manera anormal o mueran. Cuando estas células mueren, liberan más virus. Cuando esto ocurre, aparecen síntomas de la enfermedad, y el virus sigue diseminándose a otras células. Si el virus asume la función de la célula, puede hacer que esta se divida y prolifere de manera descontrolada. Dunlevy ocurre cuando un virus causa cáncer.    Los diferentes virus ingresan al organismo de distintas formas. Puede contraer un virus de la siguiente manera:  •Al ingerir alimentos o beber agua que conley entrado en contacto con el virus (están contaminados).      •Al inhalar gotitas que magno persona infectada liberó en el aire al toser o estornudar.      •Al tocar magno superficie contaminada con el virus y luego llevarse la mano a la boca, la nariz o los ojos.      •Al ser rommel por un insecto o mordido por un animal que son portadores del virus.      •Al tener contacto sexual con magno persona infectada por el virus.      •Al tener contacto con yolande o líquidos que contienen el virus, ya sea a través de un ce abierto o sterling magno transfusión.      Si el virus ingresa al organismo, el sistema de defensa del cuerpo (sistema inmunitario) intentará combatirlo. Puede correr un riesgo más alto de tener magno enfermedad viral si tiene el sistema inmunitario debilitado.      ¿Cuáles son los signos o síntomas?    Puede tener los siguientes síntomas, dependiendo del tipo de virus y de la ubicación de las células que invade:•Virus del resfrío y de la gripe:  •Fiebre.      •Dolor de missael.      •Dolor de garganta.      •Shari musculares.      •Nariz tapada (congestión nasal).      •Tos.      •Virus del aparato digestivo (gastrointestinales):  •Fiebre.      •Dolor en el abdomen.      •Náuseas.      •Diarrea.      •Virus hepáticos (hepatitis):  •Pérdida del apetito.      •Cansancio.      •La piel o las partes pat de los ojos se ponen terri (ictericia).      •Virus del cerebro y la médula matthew:  •Fiebre.      •Dolor de missael.      •Rigidez en el diego.      •Náuseas y vómitos.      •Confusión o somnolencia.      •Virus de la piel:  •Verrugas.      •Picazón.      •Erupción cutánea.      •Virus de transmisión sexual:  •Secreción.      •Hinchazón.      •Enrojecimiento.      •Erupción cutánea.          ¿Cómo se diagnostica?    Esta afección se puede diagnosticar en función de lo siguiente:  •Síntomas.      •Antecedentes médicos.      •Examen físico.      •Análisis de yolande, magno muestra de mucosidad de los pulmones (muestra de esputo), muestra de heces o un hisopado de líquidos corporales o magno llaga de la piel (lesión).        ¿Cómo se trata?    Los virus pueden ser difíciles de tratar porque se hospedan en las células. Los antibióticos no tratan los virus porque estos medicamentos no llegan al interior de las células. El tratamiento de magno enfermedad viral puede incluir lo siguiente:  •Descansar y beber abundantes líquidos.      •Medicamentos para aliviar los síntomas. Estos pueden incluir medicamentos de venta karson para el dolor y la fiebre, medicamentos para la tos o la congestión y medicamentos para aliviar la diarrea.      •Medicamentos antivirales. Estos medicamentos están disponibles únicamente para ciertos tipos de virus.      Algunas enfermedades virales pueden evitarse con vacunas. Un ejemplo frecuente es la vacuna antigripal.      Siga estas instrucciones en marquez casa:    Medicamentos     •Use los medicamentos de venta karson y los recetados solamente vinod se lo haya indicado el médico.      •Si le recetaron un medicamento antiviral, tómelo vinod se lo haya indicado el médico. No deje de radha el antiviral aunque comience a sentirse mejor.    •Infórmese sobre cuándo los antibióticos son necesarios y cuándo no. Los antibióticos no combaten a los virus. Si tiene magno infección viral y el médico laci que también tiene magno infección bacteriana, o que está en riesgo de contraerla, anahi vez le recete un antibiótico.  •No solicite magno receta para antibióticos si le conley diagnosticado magno enfermedad viral. Los antibióticos no harán que se cure más rápidamente.      •Radha antibióticos con frecuencia cuando no son necesarios puede derivar en resistencia a los antibióticos. Cuando esto ocurre, el medicamento pierde marquez eficacia contra las bacterias que normalmente combate.          Indicaciones generales      •Janeth suficiente líquido para mantener la orina de color amarillo pálido.      •Descanse todo lo que pueda.      •Retome patricia actividades normales según lo indicado por el médico. Pregúntele al médico qué actividades son seguras para usted.      •Concurra a todas las visitas de seguimiento vinod se lo haya indicado el médico. Dunlevy es importante.        ¿Cómo se previene?     Para reducir el riesgo de tener magno enfermedad viral:  •Lávese las alba frecuentemente con agua y jabón sterling al menos 20 segundos. Use desinfectante para alba si no dispone de agua y jabón.      •Evite tocarse la nariz, los ojos y la boca, sobre todo si no se lavó las alba recientemente.      •Si un miembro de la hilario tiene magno infección viral, limpie todas las superficies de la casa que puedan kyrie estado en contacto con el virus. Use [place] y jabón. También puede usar lejía con agua agregada (diluido).      •Manténgase lejos de las personas enfermas con síntomas de magno infección viral.      •No comparta objetos tales vinod cepillos de dientes y botellas de agua con otras personas.      •Mantenga las vacunas al día. Dunlevy incluye recibir la vacuna antigripal todos los años.      •Siga magno dieta saludable y descanse lo suficiente.        Comuníquese con un médico si:    •Tiene síntomas de magno enfermedad viral que no desaparecen.      •Los síntomas regresan después de kyrie desaparecido.      •Patricia síntomas empeoran.        Solicite ayuda de inmediato si tiene:    •Dificultad para respirar.      •Dolor de missael intenso o rigidez en el diego.      •Vómitos abundantes o dolor en el abdomen.      Estos síntomas pueden representar un problema grave que constituye magno emergencia. No espere a jung si los síntomas desaparecen. Solicite atención médica de inmediato. Comuníquese con el servicio de emergencias de marquez localidad (911 en los [place]). No conduzca por patricia propios medios hasta el hospital.       Resumen    •Los virus son tipos de microbios que entran en el organismo de magno persona y causan enfermedades. Las enfermedades virales pueden ser leves o graves. Pueden afectar diferentes partes del cuerpo.      •Los virus pueden ser difíciles de tratar. Hay medicamentos para aliviar los síntomas y hay algunos medicamentos antivirales.      •Si le recetaron un medicamento antiviral, tómelo vinod se lo haya indicado el médico. No deje de radha el antiviral aunque comience a sentirse mejor.      •Comuníquese con un médico si tiene síntomas de magno enfermedad viral que no desaparecen.      Esta información no tiene vinod fin reemplazar el consejo del médico. Asegúrese de hacerle al médico cualquier pregunta que tenga.

## 2024-07-16 NOTE — ED PROVIDER NOTE - ATTENDING APP SHARED VISIT CONTRIBUTION OF CARE
I, Bandar Buenrostro MD, performed the initial face to face bedside interview with this patient regarding history of present illness, review of symptoms and relevant past medical, social and family history.  I completed an independent physical examination.  I was the initial provider who evaluated this patient. I have signed out the follow up of any pending tests (i.e. labs, radiological studies) to the ACP.  I have communicated the patient’s plan of care and disposition with the ACP.    69 year old male c/o malaise. PE unremarkable. viral URI. supportive care.

## 2024-07-16 NOTE — ED PROVIDER NOTE - CLINICAL SUMMARY MEDICAL DECISION MAKING FREE TEXT BOX
no Retention Suture Text: Retention sutures were placed to support the closure and prevent dehiscence. 70 yo male PMHx HTN presents to ED c/o multiple medical complaints consistent with viral syndrome. Afebrile. Imaging negative. Supportive care. Medically stable for discharge.

## 2024-07-16 NOTE — ED PROVIDER NOTE - OBJECTIVE STATEMENT
70 yo male PMHx HTN presents to ED c/o multiple medical complaints. Patient with cough, white phlegm, headache, nasal congestion, body aches. Normal eating/drinking/BM. Last medicated with ibuprofen/acetaminophen yesterday. No further complaints at this time.   Denies fevers.

## 2024-07-16 NOTE — ED PROVIDER NOTE - PATIENT PORTAL LINK FT
You can access the FollowMyHealth Patient Portal offered by [facility] by registering at the following website: [email or web address]. By joining [employer]’s FollowMyHealth portal, you will also be able to view your health information using other applications (apps) compatible with our system.

## 2024-07-17 PROBLEM — I10 ESSENTIAL (PRIMARY) HYPERTENSION: Chronic | Status: ACTIVE | Noted: 2021-10-24

## 2024-07-17 PROCEDURE — 71250 CT THORAX DX C-: CPT | Mod: 26,MC

## 2024-07-17 RX ORDER — BENZONATATE 100 MG/1
1 TABLET ORAL
Qty: 15 | Refills: 0
Start: 2024-07-17 | End: 2024-07-21

## 2024-07-19 DIAGNOSIS — I10 ESSENTIAL (PRIMARY) HYPERTENSION: ICD-10-CM

## 2024-07-19 DIAGNOSIS — J06.9 ACUTE UPPER RESPIRATORY INFECTION, UNSPECIFIED: ICD-10-CM

## 2024-07-19 DIAGNOSIS — R05.9 COUGH, UNSPECIFIED: ICD-10-CM

## 2024-07-19 DIAGNOSIS — B97.89 OTHER VIRAL AGENTS AS THE CAUSE OF DISEASES CLASSIFIED ELSEWHERE: ICD-10-CM

## 2024-09-18 NOTE — ED ADULT NURSE REASSESSMENT NOTE - NIH STROKE SCALE: 2. BEST GAZE, QM
EMERGENCY DEPARTMENT ENCOUNTER      CHIEF COMPLAINT    Anaphylactic reaction    HISTORY OF PRESENT ILLNESS   Kayce Patel is a 23 year old female who presents to the emergency room today for evaluation of anaphylaxis.  The patient is allergic to peanuts and tree nuts.  She had chicken enchiladas for dinner and did not realize peanuts were in the red sauce.  She had eaten a large amount of the dinner and then had vomiting.  She noted sensation of throat swelling.  She gave herself her epi pen, but it was 5 years old.  She took 50 mg of  PO Benadryl as well.  The patient is feeling a little better, but still feels a tightness in her throat.  She states she feels maybe a little short of breath.  She states she is worried it will worsen after the epi wears off given the amount of food she had consumed before realizing it had peanuts.  She did not have a rash that she knows of, usually will get that on her abdomen first.      ALLERGIES    ALLERGIES:   Allergen Reactions    Peanut Oil   (Food Or Med) SWELLING     Tongue swelling and splotchy rash.  Has epipen     Tree Nuts    (Food) SWELLING       CURRENT MEDICATIONS    No current facility-administered medications for this encounter.     Current Outpatient Medications   Medication Sig Dispense Refill    EPINEPHrine 0.3 MG/0.3ML auto-injector Inject 0.3 mLs into the muscle 1 time as needed for Anaphylaxis. 2 each 1    [START ON 9/19/2024] predniSONE (DELTASONE) 20 MG tablet Take 2 tablets by mouth daily for 4 days. Begin taking on September 19, 2024. 8 tablet 0    fluticasone-salmeterol 250-50 MCG/ACT inhaler Inhale 1 puff into the lungs.      albuterol 108 (90 Base) MCG/ACT inhaler          PAST MEDICAL HISTORY    History reviewed. No pertinent past medical history.    SURGICAL HISTORY    History reviewed. No pertinent surgical history.    SOCIAL HISTORY    Social History     Tobacco Use    Smoking status: Never    Smokeless tobacco: Never   Vaping Use    Vaping 
status: never used   Substance Use Topics    Alcohol use: Yes     Comment: occ    Drug use: Never       FAMILY HISTORY    History reviewed. No pertinent family history.    REVIEW OF SYSTEMS    Constitutional:  Denies fever or chills.   HENT:  See HPI  Respiratory:  See HPI  Cardiovascular:  Denies chest pain or lower extremity edema.   Gastrointestinal:  +nausea, vomiting. Denies abdominal pain, bloody stools or diarrhea.   Integument:  Denies rash or lesions.   Neurologic:  Denies headache, focal weakness or sensory changes.      PHYSICAL EXAM    ED Triage Vitals [09/18/24 1833]   BP (!) 144/81   Heart Rate (!) 114   Resp 20   Temp 98.9 °F (37.2 °C)   SpO2 100 %      Vitals:    09/18/24 1949 09/18/24 1950 09/18/24 2000 09/18/24 2001   BP:  121/66 115/63    Pulse: 93 90 87 89   Resp: 17   19   Temp:       TempSrc:       SpO2: 97% 98% 98% 97%   Weight:       Height:       LMP: 09/11/2024       Pulse Ox Interpretation:  100% on room air.  Constitutional:  Well developed, well nourished. No acute distress, non-toxic appearance.   HENT:  External ears normal. External nose normal. Oropharynx moist.  Uvula midline, tongue without edema.  Pharynx is clear.  Respiratory:  No respiratory distress, normal breath sounds. No crackles. No wheezing. No stridor.  Cardiovascular:  Normal rate, normal rhythm. No murmurs, gallops, or rubs.  Gastrointestinal:  Soft, nondistended. Normal bowel sounds, nontender.   Musculoskeletal:  No extremity edema, tenderness, or deformities.   Integument:  Warm, dry, no rash is noted..   Neurologic:  Alert & oriented x 3.  Normal motor function. Normal sensory function. No focal deficits noted.   Psychiatric:  Speech and behavior appropriate.     EKG    EKG Interpretation  Time: 19:59 PM  Rate: 94/min  Rhythm: normal sinus rhythm   Abnormality: No STEMI.  QTc prolonged at 480 ms  Reviewed by: Dr Ma.  Final interpretation per cardiology.      ED COURSE & MEDICAL DECISION MAKING    23-year-old 
female patient presents to the emergency department today with an anaphylactic reaction.  She has a known allergy to peanuts and accidentally consumed this in her Mexican meal tonight.  She has already self administered her EpiPen (5 years ) and 50 mg of Benadryl.  She is feeling better but still has some of the throat tightness and nausea.  I ordered Zofran, Pepcid and Solu-Medrol.  She is placed on a cardiac monitor and EKG obtained.  Patient will be monitored given the amount of food that she consumed before realizing it contained peanuts.  Refill of epi pen sent, she does have more currently at home now, but does need refill.     7:57 PM:  Still with nausea, but no further throat issues. Reglan ordered as she has prolonged QTc on EKG (480 ms)    8:57 PM:  She is feeling back to normal.  She state throat is normal.  The patient has more epi pens at home, so if symptoms do return, she can use them tonight.  She states she has needed more than one epi dose in the past, but never this far out, usually would be more back to back dosing.  She feels at baseline.  She is comfortable going home.  She will be given prednisone prescription as well for discharge.  She has no other questions or concerns at this time.    Impression:  The encounter diagnosis was Anaphylaxis, initial encounter.      Follow Up:  [facility]  [address] [facility] [address] [phone]    As needed, If symptoms worsen    Pcp, No      As needed          Summary of your Discharge Medications        Take these Medications        Details   EPINEPHrine 0.3 MG/0.3ML auto-injector   Inject 0.3 mLs into the muscle 1 time as needed for Anaphylaxis.     predniSONE 20 MG tablet  Commonly known as: DELTASONE  Start taking on: 2024   Take 2 tablets by mouth daily for 4 days. Begin taking on 2024.                Pt is discharged in stable condition.      Attending physician:Dr. Contreras     
  Sahra Durham PA-C  09/18/24 5297    
(0) Normal

## 2024-09-19 ENCOUNTER — EMERGENCY (EMERGENCY)
Facility: HOSPITAL | Age: 69
LOS: 1 days | Discharge: HOME | End: 2024-09-19
Attending: STUDENT IN AN ORGANIZED HEALTH CARE EDUCATION/TRAINING PROGRAM
Payer: MEDICAID

## 2024-09-19 VITALS
SYSTOLIC BLOOD PRESSURE: 123 MMHG | RESPIRATION RATE: 18 BRPM | TEMPERATURE: 98 F | OXYGEN SATURATION: 96 % | HEART RATE: 77 BPM | DIASTOLIC BLOOD PRESSURE: 80 MMHG

## 2024-09-19 LAB
ALBUMIN SERPL ELPH-MCNC: 3.7 G/DL — SIGNIFICANT CHANGE UP (ref 3.3–5.2)
ALP SERPL-CCNC: 60 U/L — SIGNIFICANT CHANGE UP (ref 40–120)
ALT FLD-CCNC: 47 U/L — HIGH
ANION GAP SERPL CALC-SCNC: 11 MMOL/L — SIGNIFICANT CHANGE UP (ref 5–17)
ANISOCYTOSIS BLD QL: SLIGHT — SIGNIFICANT CHANGE UP
APTT BLD: 23.8 SEC — LOW (ref 24.5–35.6)
AST SERPL-CCNC: 26 U/L — SIGNIFICANT CHANGE UP
BASOPHILS # BLD AUTO: 0 K/UL — SIGNIFICANT CHANGE UP (ref 0–0.2)
BASOPHILS NFR BLD AUTO: 0 % — SIGNIFICANT CHANGE UP (ref 0–2)
BILIRUB SERPL-MCNC: 0.3 MG/DL — LOW (ref 0.4–2)
BUN SERPL-MCNC: 23.9 MG/DL — HIGH (ref 8–20)
CALCIUM SERPL-MCNC: 8.2 MG/DL — LOW (ref 8.4–10.5)
CHLORIDE SERPL-SCNC: 109 MMOL/L — HIGH (ref 96–108)
CO2 SERPL-SCNC: 23 MMOL/L — SIGNIFICANT CHANGE UP (ref 22–29)
CREAT SERPL-MCNC: 0.64 MG/DL — SIGNIFICANT CHANGE UP (ref 0.5–1.3)
EGFR: 103 ML/MIN/1.73M2 — SIGNIFICANT CHANGE UP
EOSINOPHIL # BLD AUTO: 0 K/UL — SIGNIFICANT CHANGE UP (ref 0–0.5)
EOSINOPHIL NFR BLD AUTO: 0 % — SIGNIFICANT CHANGE UP (ref 0–6)
GIANT PLATELETS BLD QL SMEAR: PRESENT — SIGNIFICANT CHANGE UP
GLUCOSE SERPL-MCNC: 150 MG/DL — HIGH (ref 70–99)
HCT VFR BLD CALC: 34 % — LOW (ref 39–50)
HGB BLD-MCNC: 11.7 G/DL — LOW (ref 13–17)
HIV 1 & 2 AB SERPL IA.RAPID: SIGNIFICANT CHANGE UP
INR BLD: 0.94 RATIO — SIGNIFICANT CHANGE UP (ref 0.85–1.18)
LIDOCAIN IGE QN: 12 U/L — LOW (ref 22–51)
LYMPHOCYTES # BLD AUTO: 2.02 K/UL — SIGNIFICANT CHANGE UP (ref 1–3.3)
LYMPHOCYTES # BLD AUTO: 20.3 % — SIGNIFICANT CHANGE UP (ref 13–44)
MANUAL SMEAR VERIFICATION: SIGNIFICANT CHANGE UP
MCHC RBC-ENTMCNC: 31.9 PG — SIGNIFICANT CHANGE UP (ref 27–34)
MCHC RBC-ENTMCNC: 34.4 GM/DL — SIGNIFICANT CHANGE UP (ref 32–36)
MCV RBC AUTO: 92.6 FL — SIGNIFICANT CHANGE UP (ref 80–100)
METAMYELOCYTES # FLD: 0.9 % — HIGH (ref 0–0)
MICROCYTES BLD QL: SLIGHT — SIGNIFICANT CHANGE UP
MONOCYTES # BLD AUTO: 0.62 K/UL — SIGNIFICANT CHANGE UP (ref 0–0.9)
MONOCYTES NFR BLD AUTO: 6.2 % — SIGNIFICANT CHANGE UP (ref 2–14)
MYELOCYTES NFR BLD: 2.7 % — HIGH (ref 0–0)
NEUTROPHILS # BLD AUTO: 6.88 K/UL — SIGNIFICANT CHANGE UP (ref 1.8–7.4)
NEUTROPHILS NFR BLD AUTO: 69 % — SIGNIFICANT CHANGE UP (ref 43–77)
NT-PROBNP SERPL-SCNC: 103 PG/ML — SIGNIFICANT CHANGE UP (ref 0–300)
PLAT MORPH BLD: NORMAL — SIGNIFICANT CHANGE UP
PLATELET # BLD AUTO: 169 K/UL — SIGNIFICANT CHANGE UP (ref 150–400)
POLYCHROMASIA BLD QL SMEAR: SLIGHT — SIGNIFICANT CHANGE UP
POTASSIUM SERPL-MCNC: 4 MMOL/L — SIGNIFICANT CHANGE UP (ref 3.5–5.3)
POTASSIUM SERPL-SCNC: 4 MMOL/L — SIGNIFICANT CHANGE UP (ref 3.5–5.3)
PROT SERPL-MCNC: 5.6 G/DL — LOW (ref 6.6–8.7)
PROTHROM AB SERPL-ACNC: 10.4 SEC — SIGNIFICANT CHANGE UP (ref 9.5–13)
RBC # BLD: 3.67 M/UL — LOW (ref 4.2–5.8)
RBC # FLD: 14.4 % — SIGNIFICANT CHANGE UP (ref 10.3–14.5)
RBC BLD AUTO: ABNORMAL
SMUDGE CELLS # BLD: PRESENT — SIGNIFICANT CHANGE UP
SODIUM SERPL-SCNC: 142 MMOL/L — SIGNIFICANT CHANGE UP (ref 135–145)
TROPONIN T, HIGH SENSITIVITY RESULT: 14 NG/L — SIGNIFICANT CHANGE UP (ref 0–51)
VARIANT LYMPHS # BLD: 0.9 % — SIGNIFICANT CHANGE UP (ref 0–6)
WBC # BLD: 9.97 K/UL — SIGNIFICANT CHANGE UP (ref 3.8–10.5)
WBC # FLD AUTO: 9.97 K/UL — SIGNIFICANT CHANGE UP (ref 3.8–10.5)

## 2024-09-19 PROCEDURE — 71046 X-RAY EXAM CHEST 2 VIEWS: CPT | Mod: 26

## 2024-09-19 PROCEDURE — 99285 EMERGENCY DEPT VISIT HI MDM: CPT

## 2024-09-19 PROCEDURE — 93010 ELECTROCARDIOGRAM REPORT: CPT

## 2024-09-19 NOTE — ED PROVIDER NOTE - HIV OFFER
If you are a smoker, it is important for your health to stop smoking. Please be aware that second hand smoke is also harmful.
Yes, get tested

## 2024-09-19 NOTE — ED PROVIDER NOTE - OBJECTIVE STATEMENT
69-year-old male with no PMH presents with abdominal distention and lower extremity swelling.  Patient states for the past month he has been having abdominal distention and lower extremity edema worse throughout the day.  Patient also reporting cough for few months.  Patient denies history of PE/DVT, CHF, cardiac history.   Patient denies alcohol use disorder and liver issues. Pt denies fevers/chills, ha, loc, focal neuro deficits, cp/sob/palp, n/v/d, urinary symptoms, recent travel and sick contacts.   patient surgical history of hernia repair

## 2024-09-19 NOTE — ED PROVIDER NOTE - CLINICAL SUMMARY MEDICAL DECISION MAKING FREE TEXT BOX
69-year-old male with no PMH presents with abdominal distention and lower extremity swelling.  will evaluate for obstruction/intra-abdominal pathology as well as fluid overload and pna Will do labs, EKG, CT, chest x-ray  will rule out  DVT versus dependent edema 69-year-old male with no PMH presents with abdominal distention and lower extremity swelling.  will evaluate for obstruction/intra-abdominal pathology as well as fluid overload and pna Will do labs, EKG, CT, chest x-ray  will rule out  DVT versus dependent edema    ROXI Tate: labs showed no leukocytosis, stable H+H, no electrolyte abnormalities, lipase neg, bnp normal, trops x3 neg, +myelocytes/giant cells/smudge cells. CXR without consolidations/opacitites/effusiuon. CT showed enlarged prostate extending to floor of urinary bladder, aslo mild infiltrates stranding near umbilicus, also significant atelectasis and air trapping in lower lobes. reviewed results with pt. advised to f.u with pcp. discussed supportive care measures and return precautions. pt verbalized understanding and agreement 69-year-old male with no PMH presents with abdominal distention and lower extremity swelling.  will evaluate for obstruction/intra-abdominal pathology as well as fluid overload and pna Will do labs, EKG, CT, chest x-ray  will rule out  DVT versus dependent edema    ROXI Tate: labs showed no leukocytosis, stable H+H, no electrolyte abnormalities, lipase neg, bnp normal, trops x3 neg, +myelocytes/giant cells/smudge cells, no thrombocytopenia. CXR without consolidations/opacitites/effusiuon. CT showed enlarged prostate extending to floor of urinary bladder, aslo mild infiltrates stranding near umbilicus, also significant atelectasis and air trapping in lower lobes. reviewed results with pt. contacted vanessa whitman for follow up at [facility] clinic. discussed supportive care measures and return precautions. pt verbalized understanding and agreement

## 2024-09-19 NOTE — ED PROVIDER NOTE - PATIENT PORTAL LINK FT
You can access the FollowMyHealth Patient Portal offered by [facility] by registering at the following website: [email or web address]. By joining [employer]’s FollowMyHealth portal, you will also be able to view your health information using other applications (apps) compatible with our system.

## 2024-09-19 NOTE — ED ADULT TRIAGE NOTE - CHIEF COMPLAINT QUOTE
Pt stated that he is having abdominal pain x 15days, denies n/v/d. Pt also complaining of swollen hands and face x 1 week. Hx HTN

## 2024-09-19 NOTE — ED ADULT NURSE NOTE - SUICIDE SCREENING QUESTION 3
[>50% of Time Spent on Counseling and Coordination of Care for  ___] : Greater than 50% of the encounter time was spent on counseling and coordination of care for [unfilled] [>50% of Time Spent on Counseling for ____] : Greater than 50% of the encounter time was spent on counseling for [unfilled] [Time Spent: ___ minutes] : I have spent [unfilled] minutes of face to face time with the patient No

## 2024-09-19 NOTE — ED PROVIDER NOTE - PHYSICAL EXAMINATION
Const: Awake, alert and oriented. In no acute distress. Well appearing.  HEENT: NC/AT. Moist mucous membranes.  Eyes: No scleral icterus. EOMI.  Neck:. Soft and supple. Full ROM without pain.  Cardiac: Regular rate and regular rhythm. +S1/S2. Peripheral pulses 2+ and symmetric. + BL LE edema non tender  Resp: Speaking in full sentences. No evidence of respiratory distress. No wheezes, rales or rhonchi.  Abd: Soft, distended upper abd ttp. Normal bowel sounds in all 4 quadrants. No guarding or rebound.  Back: Spine midline and non-tender. No CVAT.  Skin: No rashes, abrasions or lacerations.  Lymph: No cervical lymphadenopathy.  Neuro: Awake, alert & oriented x 3. Moves all extremities symmetrically.

## 2024-09-20 VITALS
OXYGEN SATURATION: 97 % | HEART RATE: 64 BPM | RESPIRATION RATE: 18 BRPM | TEMPERATURE: 98 F | DIASTOLIC BLOOD PRESSURE: 84 MMHG | SYSTOLIC BLOOD PRESSURE: 136 MMHG

## 2024-09-20 LAB
HCV AB S/CO SERPL IA: 0.08 S/CO — SIGNIFICANT CHANGE UP (ref 0–0.99)
HCV AB SERPL-IMP: SIGNIFICANT CHANGE UP
TROPONIN T, HIGH SENSITIVITY RESULT: 17 NG/L — SIGNIFICANT CHANGE UP (ref 0–51)
TROPONIN T, HIGH SENSITIVITY RESULT: 18 NG/L — SIGNIFICANT CHANGE UP (ref 0–51)

## 2024-09-20 PROCEDURE — 71260 CT THORAX DX C+: CPT | Mod: MC

## 2024-09-20 PROCEDURE — 84484 ASSAY OF TROPONIN QUANT: CPT

## 2024-09-20 PROCEDURE — 86803 HEPATITIS C AB TEST: CPT

## 2024-09-20 PROCEDURE — 71260 CT THORAX DX C+: CPT | Mod: 26,MC

## 2024-09-20 PROCEDURE — 74177 CT ABD & PELVIS W/CONTRAST: CPT | Mod: MC

## 2024-09-20 PROCEDURE — 93005 ELECTROCARDIOGRAM TRACING: CPT

## 2024-09-20 PROCEDURE — 71046 X-RAY EXAM CHEST 2 VIEWS: CPT

## 2024-09-20 PROCEDURE — 85730 THROMBOPLASTIN TIME PARTIAL: CPT

## 2024-09-20 PROCEDURE — 85610 PROTHROMBIN TIME: CPT

## 2024-09-20 PROCEDURE — 36415 COLL VENOUS BLD VENIPUNCTURE: CPT

## 2024-09-20 PROCEDURE — 80053 COMPREHEN METABOLIC PANEL: CPT

## 2024-09-20 PROCEDURE — 83690 ASSAY OF LIPASE: CPT

## 2024-09-20 PROCEDURE — 74177 CT ABD & PELVIS W/CONTRAST: CPT | Mod: 26,MC

## 2024-09-20 PROCEDURE — 85025 COMPLETE CBC W/AUTO DIFF WBC: CPT

## 2024-09-20 PROCEDURE — 83880 ASSAY OF NATRIURETIC PEPTIDE: CPT

## 2024-09-20 PROCEDURE — 99285 EMERGENCY DEPT VISIT HI MDM: CPT | Mod: 25

## 2024-09-20 PROCEDURE — T1013: CPT

## 2024-09-20 PROCEDURE — 86703 HIV-1/HIV-2 1 RESULT ANTBDY: CPT

## 2024-11-08 NOTE — ED ADULT NURSE NOTE - PATIENT/CAREGIVER OFFERED  INTERPRETER SERVICES
Patient with week-long history of migratory abdominal pain, likely related to mass found on imaging. Constipation can be another contributing factor as abdomen is distended on exam, CT abdomen revealed stool burden, and patient reports he is constipated. Low concern for peritonitis at this time, serial abdominal exams deferred. MRCP showing concern for choledocholithiasis. s/p ERCP with stone extraction 11/6    - oxy 2.5 q6 PRN for mild and oxy 5q6 for moderate/severe pain   - F/u GI recs: see cholangitis   - Trend LFT's, bilirubin: downtrending --> more consistent with choledocholithiasis than permanent obstruction in the form of a mass  - Bowel regimen for constipation yes

## 2024-11-15 ENCOUNTER — INPATIENT (INPATIENT)
Facility: HOSPITAL | Age: 69
LOS: 6 days | Discharge: NURSING, REHAB OR LONG-TERM CARE | DRG: 914 | End: 2024-11-22
Attending: INTERNAL MEDICINE | Admitting: INTERNAL MEDICINE
Payer: MEDICAID

## 2024-11-15 VITALS
RESPIRATION RATE: 18 BRPM | OXYGEN SATURATION: 96 % | DIASTOLIC BLOOD PRESSURE: 90 MMHG | SYSTOLIC BLOOD PRESSURE: 151 MMHG | HEART RATE: 84 BPM

## 2024-11-15 DIAGNOSIS — T14.8XXA OTHER INJURY OF UNSPECIFIED BODY REGION, INITIAL ENCOUNTER: ICD-10-CM

## 2024-11-15 LAB
ALBUMIN SERPL ELPH-MCNC: 3.9 G/DL — SIGNIFICANT CHANGE UP (ref 3.3–5.2)
ALP SERPL-CCNC: 60 U/L — SIGNIFICANT CHANGE UP (ref 40–120)
ALT FLD-CCNC: 46 U/L — HIGH
AMPHET UR-MCNC: NEGATIVE — SIGNIFICANT CHANGE UP
ANION GAP SERPL CALC-SCNC: 11 MMOL/L — SIGNIFICANT CHANGE UP (ref 5–17)
ANION GAP SERPL CALC-SCNC: 12 MMOL/L — SIGNIFICANT CHANGE UP (ref 5–17)
APTT BLD: 19 SEC — LOW (ref 24.5–35.6)
APTT BLD: 22.2 SEC — LOW (ref 24.5–35.6)
APTT BLD: 24.6 SEC — SIGNIFICANT CHANGE UP (ref 24.5–35.6)
AST SERPL-CCNC: 32 U/L — SIGNIFICANT CHANGE UP
BARBITURATES UR SCN-MCNC: NEGATIVE — SIGNIFICANT CHANGE UP
BASOPHILS # BLD AUTO: 0.15 K/UL — SIGNIFICANT CHANGE UP (ref 0–0.2)
BASOPHILS NFR BLD AUTO: 0.9 % — SIGNIFICANT CHANGE UP (ref 0–2)
BENZODIAZ UR-MCNC: NEGATIVE — SIGNIFICANT CHANGE UP
BILIRUB SERPL-MCNC: 0.5 MG/DL — SIGNIFICANT CHANGE UP (ref 0.4–2)
BLD GP AB SCN SERPL QL: SIGNIFICANT CHANGE UP
BUN SERPL-MCNC: 17.1 MG/DL — SIGNIFICANT CHANGE UP (ref 8–20)
BUN SERPL-MCNC: 21.2 MG/DL — HIGH (ref 8–20)
BURR CELLS BLD QL SMEAR: PRESENT — SIGNIFICANT CHANGE UP
CALCIUM SERPL-MCNC: 7.4 MG/DL — LOW (ref 8.4–10.5)
CALCIUM SERPL-MCNC: 8.2 MG/DL — LOW (ref 8.4–10.5)
CHLORIDE SERPL-SCNC: 105 MMOL/L — SIGNIFICANT CHANGE UP (ref 96–108)
CHLORIDE SERPL-SCNC: 105 MMOL/L — SIGNIFICANT CHANGE UP (ref 96–108)
CK MB CFR SERPL CALC: 5.6 NG/ML — SIGNIFICANT CHANGE UP (ref 0–6.7)
CK SERPL-CCNC: 131 U/L — SIGNIFICANT CHANGE UP (ref 30–200)
CK SERPL-CCNC: 236 U/L — HIGH (ref 30–200)
CO2 SERPL-SCNC: 22 MMOL/L — SIGNIFICANT CHANGE UP (ref 22–29)
CO2 SERPL-SCNC: 22 MMOL/L — SIGNIFICANT CHANGE UP (ref 22–29)
COCAINE METAB.OTHER UR-MCNC: NEGATIVE — SIGNIFICANT CHANGE UP
CREAT SERPL-MCNC: 0.5 MG/DL — SIGNIFICANT CHANGE UP (ref 0.5–1.3)
CREAT SERPL-MCNC: 0.59 MG/DL — SIGNIFICANT CHANGE UP (ref 0.5–1.3)
EGFR: 106 ML/MIN/1.73M2 — SIGNIFICANT CHANGE UP
EGFR: 111 ML/MIN/1.73M2 — SIGNIFICANT CHANGE UP
EOSINOPHIL # BLD AUTO: 0 K/UL — SIGNIFICANT CHANGE UP (ref 0–0.5)
EOSINOPHIL NFR BLD AUTO: 0 % — SIGNIFICANT CHANGE UP (ref 0–6)
ETHANOL SERPL-MCNC: <10 MG/DL — SIGNIFICANT CHANGE UP (ref 0–9)
FENTANYL UR QL SCN: POSITIVE
FERRITIN SERPL-MCNC: 134 NG/ML — SIGNIFICANT CHANGE UP (ref 30–400)
FIBRINOGEN PPP-MCNC: 170 MG/DL — LOW (ref 200–450)
FIBRINOGEN PPP-MCNC: 275 MG/DL — SIGNIFICANT CHANGE UP (ref 200–450)
GIANT PLATELETS BLD QL SMEAR: PRESENT — SIGNIFICANT CHANGE UP
GLUCOSE SERPL-MCNC: 110 MG/DL — HIGH (ref 70–99)
GLUCOSE SERPL-MCNC: 149 MG/DL — HIGH (ref 70–99)
HCT VFR BLD CALC: 28.7 % — LOW (ref 39–50)
HCT VFR BLD CALC: 32.3 % — LOW (ref 39–50)
HCT VFR BLD CALC: 38.6 % — LOW (ref 39–50)
HGB BLD-MCNC: 11 G/DL — LOW (ref 13–17)
HGB BLD-MCNC: 13.2 G/DL — SIGNIFICANT CHANGE UP (ref 13–17)
HGB BLD-MCNC: 9.8 G/DL — LOW (ref 13–17)
INR BLD: 1 RATIO — SIGNIFICANT CHANGE UP (ref 0.85–1.16)
INR BLD: 1.02 RATIO — SIGNIFICANT CHANGE UP (ref 0.85–1.16)
IRON SATN MFR SERPL: 12 % — LOW (ref 16–55)
IRON SATN MFR SERPL: 35 UG/DL — LOW (ref 59–158)
LYMPHOCYTES # BLD AUTO: 14.6 % — SIGNIFICANT CHANGE UP (ref 13–44)
LYMPHOCYTES # BLD AUTO: 2.48 K/UL — SIGNIFICANT CHANGE UP (ref 1–3.3)
MACROCYTES BLD QL: SLIGHT — SIGNIFICANT CHANGE UP
MAGNESIUM SERPL-MCNC: 1.8 MG/DL — SIGNIFICANT CHANGE UP (ref 1.6–2.6)
MAGNESIUM SERPL-MCNC: 1.9 MG/DL — SIGNIFICANT CHANGE UP (ref 1.6–2.6)
MANUAL SMEAR VERIFICATION: SIGNIFICANT CHANGE UP
MCHC RBC-ENTMCNC: 31.1 PG — SIGNIFICANT CHANGE UP (ref 27–34)
MCHC RBC-ENTMCNC: 31.3 PG — SIGNIFICANT CHANGE UP (ref 27–34)
MCHC RBC-ENTMCNC: 31.8 PG — SIGNIFICANT CHANGE UP (ref 27–34)
MCHC RBC-ENTMCNC: 34.1 G/DL — SIGNIFICANT CHANGE UP (ref 32–36)
MCHC RBC-ENTMCNC: 34.1 G/DL — SIGNIFICANT CHANGE UP (ref 32–36)
MCHC RBC-ENTMCNC: 34.2 G/DL — SIGNIFICANT CHANGE UP (ref 32–36)
MCV RBC AUTO: 91 FL — SIGNIFICANT CHANGE UP (ref 80–100)
MCV RBC AUTO: 91.7 FL — SIGNIFICANT CHANGE UP (ref 80–100)
MCV RBC AUTO: 93.4 FL — SIGNIFICANT CHANGE UP (ref 80–100)
METHADONE UR-MCNC: NEGATIVE — SIGNIFICANT CHANGE UP
MONOCYTES # BLD AUTO: 1.17 K/UL — HIGH (ref 0–0.9)
MONOCYTES NFR BLD AUTO: 6.9 % — SIGNIFICANT CHANGE UP (ref 2–14)
MRSA PCR RESULT.: SIGNIFICANT CHANGE UP
NEUTROPHILS # BLD AUTO: 12.03 K/UL — HIGH (ref 1.8–7.4)
NEUTROPHILS NFR BLD AUTO: 69.8 % — SIGNIFICANT CHANGE UP (ref 43–77)
NEUTS BAND # BLD: 0.9 % — SIGNIFICANT CHANGE UP (ref 0–8)
OPIATES UR-MCNC: NEGATIVE — SIGNIFICANT CHANGE UP
PCP SPEC-MCNC: SIGNIFICANT CHANGE UP
PCP UR-MCNC: NEGATIVE — SIGNIFICANT CHANGE UP
PHOSPHATE SERPL-MCNC: 2.6 MG/DL — SIGNIFICANT CHANGE UP (ref 2.4–4.7)
PHOSPHATE SERPL-MCNC: 3.8 MG/DL — SIGNIFICANT CHANGE UP (ref 2.4–4.7)
PLAT MORPH BLD: NORMAL — SIGNIFICANT CHANGE UP
PLATELET # BLD AUTO: 147 K/UL — LOW (ref 150–400)
PLATELET # BLD AUTO: 185 K/UL — SIGNIFICANT CHANGE UP (ref 150–400)
PLATELET # BLD AUTO: 231 K/UL — SIGNIFICANT CHANGE UP (ref 150–400)
POLYCHROMASIA BLD QL SMEAR: SLIGHT — SIGNIFICANT CHANGE UP
POTASSIUM SERPL-MCNC: 4 MMOL/L — SIGNIFICANT CHANGE UP (ref 3.5–5.3)
POTASSIUM SERPL-MCNC: 4.1 MMOL/L — SIGNIFICANT CHANGE UP (ref 3.5–5.3)
POTASSIUM SERPL-SCNC: 4 MMOL/L — SIGNIFICANT CHANGE UP (ref 3.5–5.3)
POTASSIUM SERPL-SCNC: 4.1 MMOL/L — SIGNIFICANT CHANGE UP (ref 3.5–5.3)
PROT SERPL-MCNC: 6.1 G/DL — LOW (ref 6.6–8.7)
PROTHROM AB SERPL-ACNC: 11.6 SEC — SIGNIFICANT CHANGE UP (ref 9.9–13.4)
PROTHROM AB SERPL-ACNC: 11.8 SEC — SIGNIFICANT CHANGE UP (ref 9.9–13.4)
RBC # BLD: 3.13 M/UL — LOW (ref 4.2–5.8)
RBC # BLD: 3.46 M/UL — LOW (ref 4.2–5.8)
RBC # BLD: 4.24 M/UL — SIGNIFICANT CHANGE UP (ref 4.2–5.8)
RBC # FLD: 12.3 % — SIGNIFICANT CHANGE UP (ref 10.3–14.5)
RBC # FLD: 12.3 % — SIGNIFICANT CHANGE UP (ref 10.3–14.5)
RBC # FLD: 12.5 % — SIGNIFICANT CHANGE UP (ref 10.3–14.5)
RBC BLD AUTO: ABNORMAL
S AUREUS DNA NOSE QL NAA+PROBE: SIGNIFICANT CHANGE UP
SODIUM SERPL-SCNC: 138 MMOL/L — SIGNIFICANT CHANGE UP (ref 135–145)
SODIUM SERPL-SCNC: 139 MMOL/L — SIGNIFICANT CHANGE UP (ref 135–145)
THC UR QL: NEGATIVE — SIGNIFICANT CHANGE UP
TIBC SERPL-MCNC: 297 UG/DL — SIGNIFICANT CHANGE UP (ref 220–430)
TRANSFERRIN SERPL-MCNC: 208 MG/DL — SIGNIFICANT CHANGE UP (ref 180–329)
TROPONIN T, HIGH SENSITIVITY RESULT: 11 NG/L — SIGNIFICANT CHANGE UP (ref 0–51)
TSH SERPL-MCNC: 0.23 UIU/ML — LOW (ref 0.27–4.2)
VARIANT LYMPHS # BLD: 6.9 % — HIGH (ref 0–6)
VIT B12 SERPL-MCNC: 555 PG/ML — SIGNIFICANT CHANGE UP (ref 232–1245)
WBC # BLD: 17.01 K/UL — HIGH (ref 3.8–10.5)
WBC # BLD: 17.92 K/UL — HIGH (ref 3.8–10.5)
WBC # BLD: 28.33 K/UL — HIGH (ref 3.8–10.5)
WBC # FLD AUTO: 17.01 K/UL — HIGH (ref 3.8–10.5)
WBC # FLD AUTO: 17.92 K/UL — HIGH (ref 3.8–10.5)
WBC # FLD AUTO: 28.33 K/UL — HIGH (ref 3.8–10.5)

## 2024-11-15 PROCEDURE — 73130 X-RAY EXAM OF HAND: CPT | Mod: 26,RT

## 2024-11-15 PROCEDURE — 73562 X-RAY EXAM OF KNEE 3: CPT | Mod: 26,LT

## 2024-11-15 PROCEDURE — 74177 CT ABD & PELVIS W/CONTRAST: CPT | Mod: 26,59,MC

## 2024-11-15 PROCEDURE — 72125 CT NECK SPINE W/O DYE: CPT | Mod: 26,MC

## 2024-11-15 PROCEDURE — 70486 CT MAXILLOFACIAL W/O DYE: CPT | Mod: 26,MC

## 2024-11-15 PROCEDURE — 73610 X-RAY EXAM OF ANKLE: CPT | Mod: 26,RT

## 2024-11-15 PROCEDURE — 27197 CLSD TX PELVIC RING FX: CPT

## 2024-11-15 PROCEDURE — 70450 CT HEAD/BRAIN W/O DYE: CPT | Mod: 26,MC,59

## 2024-11-15 PROCEDURE — 70496 CT ANGIOGRAPHY HEAD: CPT | Mod: 26,MC

## 2024-11-15 PROCEDURE — 71045 X-RAY EXAM CHEST 1 VIEW: CPT | Mod: 26

## 2024-11-15 PROCEDURE — 73700 CT LOWER EXTREMITY W/O DYE: CPT | Mod: 26,RT

## 2024-11-15 PROCEDURE — 71260 CT THORAX DX C+: CPT | Mod: 26,MC

## 2024-11-15 PROCEDURE — 99255 IP/OBS CONSLTJ NEW/EST HI 80: CPT

## 2024-11-15 PROCEDURE — 70498 CT ANGIOGRAPHY NECK: CPT | Mod: 26,MC

## 2024-11-15 PROCEDURE — 72128 CT CHEST SPINE W/O DYE: CPT | Mod: 26,MC

## 2024-11-15 PROCEDURE — 99291 CRITICAL CARE FIRST HOUR: CPT

## 2024-11-15 PROCEDURE — 99223 1ST HOSP IP/OBS HIGH 75: CPT | Mod: 57

## 2024-11-15 PROCEDURE — 74174 CTA ABD&PLVS W/CONTRAST: CPT | Mod: 26

## 2024-11-15 PROCEDURE — 72170 X-RAY EXAM OF PELVIS: CPT | Mod: 26

## 2024-11-15 PROCEDURE — 26600 TREAT METACARPAL FRACTURE: CPT | Mod: F7

## 2024-11-15 PROCEDURE — 93010 ELECTROCARDIOGRAM REPORT: CPT

## 2024-11-15 PROCEDURE — 72131 CT LUMBAR SPINE W/O DYE: CPT | Mod: 26,MC

## 2024-11-15 PROCEDURE — 27220 TREAT HIP SOCKET FRACTURE: CPT | Mod: LT

## 2024-11-15 RX ORDER — CHLORHEXIDINE GLUCONATE 1.2 MG/ML
1 RINSE ORAL DAILY
Refills: 0 | Status: DISCONTINUED | OUTPATIENT
Start: 2024-11-15 | End: 2024-11-18

## 2024-11-15 RX ORDER — SENNOSIDES 8.6 MG
2 TABLET ORAL AT BEDTIME
Refills: 0 | Status: DISCONTINUED | OUTPATIENT
Start: 2024-11-15 | End: 2024-11-21

## 2024-11-15 RX ORDER — FENTANYL 12 UG/H
25 PATCH, EXTENDED RELEASE TRANSDERMAL ONCE
Refills: 0 | Status: DISCONTINUED | OUTPATIENT
Start: 2024-11-15 | End: 2024-11-15

## 2024-11-15 RX ORDER — ACETAMINOPHEN 500MG 500 MG/1
1000 TABLET, COATED ORAL EVERY 6 HOURS
Refills: 0 | Status: COMPLETED | OUTPATIENT
Start: 2024-11-15 | End: 2024-11-17

## 2024-11-15 RX ORDER — HYDROMORPHONE HYDROCHLORIDE 2 MG/1
0.2 TABLET ORAL EVERY 4 HOURS
Refills: 0 | Status: DISCONTINUED | OUTPATIENT
Start: 2024-11-15 | End: 2024-11-19

## 2024-11-15 RX ORDER — TAMSULOSIN HYDROCHLORIDE 0.4 MG/1
0.4 CAPSULE ORAL AT BEDTIME
Refills: 0 | Status: DISCONTINUED | OUTPATIENT
Start: 2024-11-15 | End: 2024-11-22

## 2024-11-15 RX ORDER — OXYCODONE HYDROCHLORIDE 30 MG/1
2.5 TABLET ORAL EVERY 4 HOURS
Refills: 0 | Status: DISCONTINUED | OUTPATIENT
Start: 2024-11-15 | End: 2024-11-15

## 2024-11-15 RX ORDER — SODIUM CHLORIDE 9 MG/ML
1000 INJECTION, SOLUTION INTRAMUSCULAR; INTRAVENOUS; SUBCUTANEOUS ONCE
Refills: 0 | Status: COMPLETED | OUTPATIENT
Start: 2024-11-15 | End: 2024-11-15

## 2024-11-15 RX ORDER — OXYCODONE HYDROCHLORIDE 30 MG/1
5 TABLET ORAL EVERY 4 HOURS
Refills: 0 | Status: DISCONTINUED | OUTPATIENT
Start: 2024-11-15 | End: 2024-11-22

## 2024-11-15 RX ORDER — POLYETHYLENE GLYCOL 3350 17 G/17G
17 POWDER, FOR SOLUTION ORAL DAILY
Refills: 0 | Status: DISCONTINUED | OUTPATIENT
Start: 2024-11-15 | End: 2024-11-21

## 2024-11-15 RX ORDER — CHLORHEXIDINE GLUCONATE 1.2 MG/ML
1 RINSE ORAL DAILY
Refills: 0 | Status: DISCONTINUED | OUTPATIENT
Start: 2024-11-15 | End: 2024-11-15

## 2024-11-15 RX ORDER — 0.9 % SODIUM CHLORIDE 0.9 %
1000 INTRAVENOUS SOLUTION INTRAVENOUS
Refills: 0 | Status: DISCONTINUED | OUTPATIENT
Start: 2024-11-15 | End: 2024-11-15

## 2024-11-15 RX ORDER — AMLODIPINE BESYLATE 10 MG/1
5 TABLET ORAL DAILY
Refills: 0 | Status: DISCONTINUED | OUTPATIENT
Start: 2024-11-15 | End: 2024-11-22

## 2024-11-15 RX ORDER — ACETAMINOPHEN 500MG 500 MG/1
1000 TABLET, COATED ORAL ONCE
Refills: 0 | Status: COMPLETED | OUTPATIENT
Start: 2024-11-15 | End: 2024-11-15

## 2024-11-15 RX ORDER — ACETAMINOPHEN, DIPHENHYDRAMINE HCL, PHENYLEPHRINE HCL 325; 25; 5 MG/1; MG/1; MG/1
5 TABLET ORAL AT BEDTIME
Refills: 0 | Status: DISCONTINUED | OUTPATIENT
Start: 2024-11-15 | End: 2024-11-22

## 2024-11-15 RX ADMIN — FENTANYL 25 MICROGRAM(S): 12 PATCH, EXTENDED RELEASE TRANSDERMAL at 09:30

## 2024-11-15 RX ADMIN — ACETAMINOPHEN 500MG 1000 MILLIGRAM(S): 500 TABLET, COATED ORAL at 17:22

## 2024-11-15 RX ADMIN — FENTANYL 25 MICROGRAM(S): 12 PATCH, EXTENDED RELEASE TRANSDERMAL at 10:15

## 2024-11-15 RX ADMIN — HYDROMORPHONE HYDROCHLORIDE 0.2 MILLIGRAM(S): 2 TABLET ORAL at 15:25

## 2024-11-15 RX ADMIN — ACETAMINOPHEN 500MG 400 MILLIGRAM(S): 500 TABLET, COATED ORAL at 10:40

## 2024-11-15 RX ADMIN — Medication 100 MILLILITER(S): at 13:41

## 2024-11-15 RX ADMIN — Medication 2 TABLET(S): at 21:01

## 2024-11-15 RX ADMIN — ACETAMINOPHEN 500MG 1000 MILLIGRAM(S): 500 TABLET, COATED ORAL at 11:00

## 2024-11-15 RX ADMIN — HYDROMORPHONE HYDROCHLORIDE 0.2 MILLIGRAM(S): 2 TABLET ORAL at 14:25

## 2024-11-15 RX ADMIN — SODIUM CHLORIDE 1000 MILLILITER(S): 9 INJECTION, SOLUTION INTRAMUSCULAR; INTRAVENOUS; SUBCUTANEOUS at 10:40

## 2024-11-15 RX ADMIN — SODIUM CHLORIDE 6000 MILLILITER(S): 9 INJECTION, SOLUTION INTRAMUSCULAR; INTRAVENOUS; SUBCUTANEOUS at 13:37

## 2024-11-15 RX ADMIN — ACETAMINOPHEN, DIPHENHYDRAMINE HCL, PHENYLEPHRINE HCL 5 MILLIGRAM(S): 325; 25; 5 TABLET ORAL at 21:01

## 2024-11-15 RX ADMIN — ACETAMINOPHEN 500MG 400 MILLIGRAM(S): 500 TABLET, COATED ORAL at 16:22

## 2024-11-15 RX ADMIN — OXYCODONE HYDROCHLORIDE 5 MILLIGRAM(S): 30 TABLET ORAL at 21:05

## 2024-11-15 RX ADMIN — Medication 25 GRAM(S): at 21:23

## 2024-11-15 RX ADMIN — TAMSULOSIN HYDROCHLORIDE 0.4 MILLIGRAM(S): 0.4 CAPSULE ORAL at 21:01

## 2024-11-15 RX ADMIN — OXYCODONE HYDROCHLORIDE 5 MILLIGRAM(S): 30 TABLET ORAL at 20:23

## 2024-11-15 NOTE — PATIENT PROFILE ADULT - NSPROPOAURINARYCATHETER_GEN_A_NUR
no
Benefits, risks, and possible complications of procedure explained to patient/caregiver who verbalized understanding and gave verbal consent.

## 2024-11-15 NOTE — ED ADULT NURSE NOTE - NSFALLRISKINTERV_ED_ALL_ED
Assistance OOB with selected safe patient handling equipment if applicable/Assistance with ambulation/Communicate fall risk and risk factors to all staff, patient, and family/Monitor gait and stability/Provide visual cue: yellow wristband, yellow gown, etc/Reinforce activity limits and safety measures with patient and family/Call bell, personal items and telephone in reach/Instruct patient to call for assistance before getting out of bed/chair/stretcher/Non-slip footwear applied when patient is off stretcher/[place] to call system/Physically safe environment - no spills, clutter or unnecessary equipment/Purposeful Proactive Rounding/Room/bathroom lighting operational, light cord in reach
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
## 2024-11-15 NOTE — ED PROVIDER NOTE - ATTENDING CONTRIBUTION TO CARE
Patient was critically ill with a high probability of imminent or life threatening deterioration.  I have performed direct patient care (not related to procedure), additional history taking, interpretation of diagnostic studies, documentation, consultation with other physicians, telephone consultation with the patient's family.   I have personally and independently provided the amount of critical care time documented below excluding time spent on separate procedures.  critical care time: 60 minutes  Dr. Lee: I personally saw the patient with the resident and performed a substantive portion of the visit. I performed a face to face bedside interview with patient regarding history of present illness, review of symptoms and past medical history. I completed an independent physical exam and all aspects of medical decision making. I have discussed patient's plan of care with resident. I agree with note as stated above, having amended the EMR as needed to reflect my findings. This includes HISTORY OF PRESENT ILLNESS, HIV, PAST MEDICAL/SURGICAL/FAMILY/SOCIAL HISTORY, ALLERGIES AND HOME MEDICATIONS, ROS, PHYSICAL EXAM, MEDICAL DECISION MAKING and any PROGRESS NOTES during the time I functioned as the attending physician for this patient.  SEE MDM
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
## 2024-11-15 NOTE — CONSULT NOTE ADULT - NS ATTEND AMEND GEN_ALL_CORE FT
Orthopaedic Trauma Surgeon Addendum:    I have reviewed the physician assistant note and agree with the history, exam, and plan of care, except as noted.    No intervention needed. TTWB preferred vs WBAT if unable to tolerate.     Jass Leung MD  Orthopaedic Trauma Surgeon  [facility] [place]
NSGY Attg:    see above    patient seen and examined    agree with above    plan of care determined for cervical spine VB height loss and brain lesion  MRIs pending
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
## 2024-11-15 NOTE — PATIENT PROFILE ADULT - FALL HARM RISK - HARM RISK INTERVENTIONS
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
## 2024-11-15 NOTE — ED PROVIDER NOTE - PHYSICAL EXAMINATION
General: NAD, well appearing  HEENT: L forehead hematoma, abrasions on forehead  Neck: No apparent stiffness or JVD  Pulm: Chest wall symmetric and bl tenderness, lungs clear to ascultation   Cardiac: Regular rate and regular rhythm, distal pulses intact  Abdomen: Nontender and nondistended  Skin: Skin is warm, dry and intact without rashes or lesions.  Neuro: No motor or sensory deficits above reported baseline  MSK: thoracic and lumbar spine tenderness, R femoral tenderness with deformity General: NAD, well appearing  HEENT: L forehead hematoma, abrasions on forehead  Neck: No apparent stiffness or JVD  Pulm: Chest wall symmetric and bl tenderness, lungs clear to ascultation   Cardiac: Regular rate and regular rhythm, distal pulses intact  Abdomen: Nontender and nondistended  Skin: Skin is warm, dry and intact without rashes or lesions.  Neuro: No motor or sensory deficits above reported baseline  MSK: thoracic and lumbar spine tenderness, + TTP of L shoulder, R knee, L ankle
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
## 2024-11-15 NOTE — H&P ADULT - NSHPPHYSICALEXAM_GEN_ALL_CORE
Constitutional: pt is a 70 y/o male presenting in no acute distress  HEENT: + HEMATOMA to L forehead, + ABRASIONS to L forehead and left side of face, maxillofacial structures feel stable, no blood from nares or oral cavity, no watt sign / raccoon eyes, pupils round and reactive to light b/l  Neck: cervical collar in place, trachea midline  Respiratory: breath sounds CTA b/l respirations are unlabored, no accessory muscle use, no conversational dyspnea, O2 sat 96%  Cardiovascular: regular rate & rhythm, HR 90 bpm, regular, manual /88mmHg  Chest: + TTP of bilateral chest walls, no subQ emphysema or crepitus palpated  Gastrointestinal: abdomen soft, non-tender, non-distended, no rebound tenderness / guarding, no ecchymosis or external signs of abdominal trauma  MSK: moving all extremities spontaneously, + ABRASIONS to b/l hands, R ankle. + TTP of L shoulder, R knee, L ankle  Pelvis: stable, + TTP of b/l hips  Vascular: 2+ radial, femoral, and DP pulses b/l  Neurological: GCS15, A&O x 3, no focal neurologic deficits  Back: + TTP of T&L spine, L gluteal ecchymosis, no step-offs or signs of external trauma to the back Constitutional: pt is a 68 y/o male presenting in no acute distress  HEENT: + HEMATOMA to L forehead, + ABRASIONS to L forehead and left side of face, maxillofacial structures feel stable, no blood from nares or oral cavity, no watt sign / raccoon eyes, pupils 2mm round and reactive to light b/l  Neck: cervical collar in place, trachea midline  Respiratory: breath sounds CTA b/l respirations are unlabored, no accessory muscle use, no conversational dyspnea, O2 sat 96%  Cardiovascular: regular rate & rhythm, HR 90 bpm, regular, manual /88mmHg  Chest: + TTP of bilateral chest walls, no subQ emphysema or crepitus palpated  Gastrointestinal: abdomen soft, non-tender, non-distended, no rebound tenderness / guarding, no ecchymosis or external signs of abdominal trauma  MSK: moving all extremities spontaneously, + ABRASIONS to b/l hands, R ankle. + TTP of L shoulder, R knee, L ankle  Pelvis: stable, + TTP of b/l hips  Vascular: 2+ radial, femoral, and DP pulses b/l  Neurological: GCS15, A&O x 3, no focal neurologic deficits  Back: + TTP of T&L spine, L gluteal ecchymosis, no step-offs or signs of external trauma to the back
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
## 2024-11-16 LAB
ANION GAP SERPL CALC-SCNC: 12 MMOL/L — SIGNIFICANT CHANGE UP (ref 5–17)
BASOPHILS # BLD AUTO: 0.04 K/UL — SIGNIFICANT CHANGE UP (ref 0–0.2)
BASOPHILS NFR BLD AUTO: 0.3 % — SIGNIFICANT CHANGE UP (ref 0–2)
BUN SERPL-MCNC: 20.7 MG/DL — HIGH (ref 8–20)
CALCIUM SERPL-MCNC: 7.7 MG/DL — LOW (ref 8.4–10.5)
CHLORIDE SERPL-SCNC: 103 MMOL/L — SIGNIFICANT CHANGE UP (ref 96–108)
CK MB CFR SERPL CALC: 4.7 NG/ML — SIGNIFICANT CHANGE UP (ref 0–6.7)
CK SERPL-CCNC: 199 U/L — SIGNIFICANT CHANGE UP (ref 30–200)
CO2 SERPL-SCNC: 23 MMOL/L — SIGNIFICANT CHANGE UP (ref 22–29)
CREAT SERPL-MCNC: 0.63 MG/DL — SIGNIFICANT CHANGE UP (ref 0.5–1.3)
EGFR: 104 ML/MIN/1.73M2 — SIGNIFICANT CHANGE UP
EOSINOPHIL # BLD AUTO: 0.05 K/UL — SIGNIFICANT CHANGE UP (ref 0–0.5)
EOSINOPHIL NFR BLD AUTO: 0.3 % — SIGNIFICANT CHANGE UP (ref 0–6)
GLUCOSE SERPL-MCNC: 118 MG/DL — HIGH (ref 70–99)
HCT VFR BLD CALC: 28.5 % — LOW (ref 39–50)
HGB BLD-MCNC: 9.8 G/DL — LOW (ref 13–17)
IMM GRANULOCYTES NFR BLD AUTO: 2 % — HIGH (ref 0–0.9)
LYMPHOCYTES # BLD AUTO: 1.57 K/UL — SIGNIFICANT CHANGE UP (ref 1–3.3)
LYMPHOCYTES # BLD AUTO: 9.8 % — LOW (ref 13–44)
MAGNESIUM SERPL-MCNC: 2.4 MG/DL — SIGNIFICANT CHANGE UP (ref 1.8–2.6)
MCHC RBC-ENTMCNC: 31.7 PG — SIGNIFICANT CHANGE UP (ref 27–34)
MCHC RBC-ENTMCNC: 34.4 G/DL — SIGNIFICANT CHANGE UP (ref 32–36)
MCV RBC AUTO: 92.2 FL — SIGNIFICANT CHANGE UP (ref 80–100)
MONOCYTES # BLD AUTO: 1.32 K/UL — HIGH (ref 0–0.9)
MONOCYTES NFR BLD AUTO: 8.3 % — SIGNIFICANT CHANGE UP (ref 2–14)
NEUTROPHILS # BLD AUTO: 12.64 K/UL — HIGH (ref 1.8–7.4)
NEUTROPHILS NFR BLD AUTO: 79.3 % — HIGH (ref 43–77)
PHOSPHATE SERPL-MCNC: 3.6 MG/DL — SIGNIFICANT CHANGE UP (ref 2.4–4.7)
PLATELET # BLD AUTO: 159 K/UL — SIGNIFICANT CHANGE UP (ref 150–400)
POTASSIUM SERPL-MCNC: 4 MMOL/L — SIGNIFICANT CHANGE UP (ref 3.5–5.3)
POTASSIUM SERPL-SCNC: 4 MMOL/L — SIGNIFICANT CHANGE UP (ref 3.5–5.3)
RBC # BLD: 3.09 M/UL — LOW (ref 4.2–5.8)
RBC # FLD: 12.6 % — SIGNIFICANT CHANGE UP (ref 10.3–14.5)
SODIUM SERPL-SCNC: 138 MMOL/L — SIGNIFICANT CHANGE UP (ref 135–145)
WBC # BLD: 15.94 K/UL — HIGH (ref 3.8–10.5)
WBC # FLD AUTO: 15.94 K/UL — HIGH (ref 3.8–10.5)

## 2024-11-16 PROCEDURE — 72146 MRI CHEST SPINE W/O DYE: CPT | Mod: 26

## 2024-11-16 PROCEDURE — 70553 MRI BRAIN STEM W/O & W/DYE: CPT | Mod: 26

## 2024-11-16 PROCEDURE — 99232 SBSQ HOSP IP/OBS MODERATE 35: CPT

## 2024-11-16 PROCEDURE — 99223 1ST HOSP IP/OBS HIGH 75: CPT

## 2024-11-16 PROCEDURE — 72141 MRI NECK SPINE W/O DYE: CPT | Mod: 26

## 2024-11-16 PROCEDURE — 73560 X-RAY EXAM OF KNEE 1 OR 2: CPT | Mod: 26,RT

## 2024-11-16 PROCEDURE — 99233 SBSQ HOSP IP/OBS HIGH 50: CPT

## 2024-11-16 RX ORDER — POVIDONE, POLYVINYL ALCOHOL 20; 27 G/1000ML; G/1000ML
1 SOLUTION OPHTHALMIC THREE TIMES A DAY
Refills: 0 | Status: DISCONTINUED | OUTPATIENT
Start: 2024-11-16 | End: 2024-11-22

## 2024-11-16 RX ORDER — ENOXAPARIN SODIUM 30 MG/.3ML
30 INJECTION SUBCUTANEOUS EVERY 12 HOURS
Refills: 0 | Status: DISCONTINUED | OUTPATIENT
Start: 2024-11-17 | End: 2024-11-22

## 2024-11-16 RX ADMIN — ACETAMINOPHEN 500MG 1000 MILLIGRAM(S): 500 TABLET, COATED ORAL at 17:15

## 2024-11-16 RX ADMIN — HYDROMORPHONE HYDROCHLORIDE 0.2 MILLIGRAM(S): 2 TABLET ORAL at 12:00

## 2024-11-16 RX ADMIN — ACETAMINOPHEN, DIPHENHYDRAMINE HCL, PHENYLEPHRINE HCL 5 MILLIGRAM(S): 325; 25; 5 TABLET ORAL at 21:18

## 2024-11-16 RX ADMIN — OXYCODONE HYDROCHLORIDE 5 MILLIGRAM(S): 30 TABLET ORAL at 05:12

## 2024-11-16 RX ADMIN — POLYETHYLENE GLYCOL 3350 17 GRAM(S): 17 POWDER, FOR SOLUTION ORAL at 11:45

## 2024-11-16 RX ADMIN — Medication 15 MILLILITER(S): at 11:45

## 2024-11-16 RX ADMIN — Medication 2 TABLET(S): at 21:18

## 2024-11-16 RX ADMIN — OXYCODONE HYDROCHLORIDE 5 MILLIGRAM(S): 30 TABLET ORAL at 22:00

## 2024-11-16 RX ADMIN — ACETAMINOPHEN 500MG 400 MILLIGRAM(S): 500 TABLET, COATED ORAL at 16:58

## 2024-11-16 RX ADMIN — HYDROMORPHONE HYDROCHLORIDE 0.2 MILLIGRAM(S): 2 TABLET ORAL at 11:44

## 2024-11-16 RX ADMIN — TAMSULOSIN HYDROCHLORIDE 0.4 MILLIGRAM(S): 0.4 CAPSULE ORAL at 21:19

## 2024-11-16 RX ADMIN — OXYCODONE HYDROCHLORIDE 5 MILLIGRAM(S): 30 TABLET ORAL at 21:19

## 2024-11-16 RX ADMIN — CHLORHEXIDINE GLUCONATE 1 APPLICATION(S): 1.2 RINSE ORAL at 11:44

## 2024-11-16 RX ADMIN — OXYCODONE HYDROCHLORIDE 5 MILLIGRAM(S): 30 TABLET ORAL at 05:53
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
## 2024-11-16 NOTE — PROGRESS NOTE ADULT - SUBJECTIVE AND OBJECTIVE BOX
INTERVAL EVENTS:    [ ] Due to altered mental status/intubation, subjective information were not able to be obtained from the patient. History was obtained, to the extent possible, from review of the chart and collateral sources of information.    OBJECTIVE:    VITALS:  Vital Signs Last 24 Hrs  T(C): 36.9 (16 Nov 2024 04:08), Max: 37.1 (15 Nov 2024 21:05)  T(F): 98.4 (16 Nov 2024 04:08), Max: 98.8 (15 Nov 2024 21:05)  HR: 66 (16 Nov 2024 07:00) (57 - 84)  BP: 89/64 (16 Nov 2024 07:00) (89/64 - 151/90)  BP(mean): 73 (16 Nov 2024 07:00) (73 - 101)  RR: 10 (16 Nov 2024 07:00) (10 - 20)  SpO2: 93% (16 Nov 2024 07:00) (92% - 100%)    Parameters below as of 16 Nov 2024 04:00  Patient On (Oxygen Delivery Method): room air        I&O's Summary    15 Nov 2024 07:01  -  16 Nov 2024 07:00  --------------------------------------------------------  IN: 1200 mL / OUT: 1175 mL / NET: 25 mL    PHYSICAL EXAM:    NEURO  Alert and oriented x 3  no gross motor deficit   Motor reduced due to pain       RESPIRATORY  Exam:  CTA    CARDIOVASCULAR  RRR. intermittent tachycardia due to pain    GI/NUTRITION  Soft NT ND    GENITOURINARY  No swelling or ecchymosis currently  Still with subjective incomplete voiding     ACCESS DEVICES:  [x] Necessity of urinary, arterial, and venous catheters discussed      LABS:                        9.8    15.94 )-----------( 159      ( 16 Nov 2024 02:44 )             28.5   11-16    138  |  103  |  20.7[H]  ----------------------------<  118[H]  4.0   |  23.0  |  0.63    Ca    7.7[L]      16 Nov 2024 02:44  Phos  3.6     11-16  Mg     2.4     11-16    TPro  6.1[L]  /  Alb  3.9  /  TBili  0.5  /  DBili  x   /  AST  32  /  ALT  46[H]  /  AlkPhos  60  11-15    CAPILLARY BLOOD GLUCOSE      MEDICATIONS:   MEDICATIONS  (STANDING):  amLODIPine   Tablet 5 milliGRAM(s) Oral daily  chlorhexidine 2% Cloths 1 Application(s) Topical daily  melatonin 5 milliGRAM(s) Oral at bedtime  multivitamin/minerals/iron Oral Solution (CENTRUM) 15 milliLiter(s) Oral daily  polyethylene glycol 3350 17 Gram(s) Oral daily  senna 2 Tablet(s) Oral at bedtime  tamsulosin 0.4 milliGRAM(s) Oral at bedtime    MEDICATIONS  (PRN):  acetaminophen   IVPB .. 1000 milliGRAM(s) IV Intermittent every 6 hours PRN Mild Pain (1 - 3), Moderate Pain (4 - 6), Severe Pain (7 - 10)  HYDROmorphone  Injectable 0.2 milliGRAM(s) IV Push every 4 hours PRN Breakthrough Pain  oxyCODONE    IR 2.5 milliGRAM(s) Oral every 4 hours PRN Moderate Pain (4 - 6)  oxyCODONE    IR 5 milliGRAM(s) Oral every 4 hours PRN Severe Pain (7 - 10)      < from: CT Angio Abdomen and Pelvis w/ IV Cont (11.15.24 @ 14:13) >  IMPRESSION:  Complex left pelvic fractures again seen with associated hemorrhage in   the anterior space of Retzius as well as intramuscular hematomas   involving the left obturator externus and internus muscles as seen on   study of earlier in the day without definite arterial extravasation of   contrast to suggest active bleeding.    < end of copied text >  < from: CT Ankle No Cont, Right (11.15.24 @ 14:12) >    IMPRESSION:  1.  No fractures are seen.    < end of copied text >  < from: Xray Knee 3 Views, Left (11.15.24 @ 10:24) >  IMPRESSION: Left inner acetabular fracture with pelvic hematoma.    Nondisplaced fracture of the proximal phalanx of the third right finger.    < end of copied text >  < from: Xray Hand 3 Views, Right (11.15.24 @ 10:23) >    Nondisplaced fracture of the proximal phalanx of the third right finger.    < end of copied text >  < from: CT Thoracic Spine Reform No Cont (11.15.24 @ 09:37) >  IMPRESSION:  < from: CT Abdomen and Pelvis w/ IV Cont (11.15.24 @ 09:27) >  IMPRESSION:  Multiple left-sided pelvic fractures, increased involving the ileum,   anterior and posterior acetabulum, pubic body and inferior pubic ramus.    Small amount of associated extraperitoneal pelvic hematoma. No active   bleeding is identified within the limitations of the CT angiography   technique. CT which was performed without    < end of copied text >  Mild compression deformities superior endplates of T3 and T4 which are   new from prior chest CT of 9/20/2024.    < end of copied text >    < from: CT Cervical Spine No Cont (11.15.24 @ 09:25) >  IMPRESSION:    CT HEAD:  1.  No evidence of acute intracranial hemorrhage or midline shift.  2.  Dystrophic calcification in the right temporal lobe, grossly stable   with adjacent focus of increased attenuation, more conspicuous when   compared prior imaging, however grossly stable in size.  3.  Large left frontal scalp hematoma and contusion  4.  Chronic small vessel disease    CT FACIAL BONES:  1.  No evidence of acute fracture of the facial bones.  2.  Small focal dehiscence of the right lamina papyracea.    CT CERVICAL SPINE:  1.  Reduced vertebral body heights in the cervical spine, most   significantly C5-C7. This is likely degenerative in nature, however a   compression fracture deformity is not entirely excluded. Recommend MRI of   the cervical spine for further evaluation.  2.  Multilevel degenerative change of the cervical spine, most   significantly at the C6 level where there is moderate to severe narrowing   of the spinal canal. In the setting of myelopathy, recommend MRI of the   cervical spine.    --- End of Report ---    < end of copied text >     INTERVAL EVENTS:    comfortable  no overnight events     OBJECTIVE:    VITALS:  Vital Signs Last 24 Hrs  T(C): 36.9 (16 Nov 2024 04:08), Max: 37.1 (15 Nov 2024 21:05)  T(F): 98.4 (16 Nov 2024 04:08), Max: 98.8 (15 Nov 2024 21:05)  HR: 66 (16 Nov 2024 07:00) (57 - 84)  BP: 89/64 (16 Nov 2024 07:00) (89/64 - 151/90)  BP(mean): 73 (16 Nov 2024 07:00) (73 - 101)  RR: 10 (16 Nov 2024 07:00) (10 - 20)  SpO2: 93% (16 Nov 2024 07:00) (92% - 100%)    Parameters below as of 16 Nov 2024 04:00  Patient On (Oxygen Delivery Method): room air        I&O's Summary    15 Nov 2024 07:01  -  16 Nov 2024 07:00  --------------------------------------------------------  IN: 1200 mL / OUT: 1175 mL / NET: 25 mL    PHYSICAL EXAM:    NEURO  Alert and oriented x 3  no gross motor deficit   Motor reduced due to pain       RESPIRATORY  Exam:  CTA    CARDIOVASCULAR  RRR. intermittent tachycardia due to pain    GI/NUTRITION  Soft NT ND    GENITOURINARY  No swelling or ecchymosis currently  Still with subjective incomplete voiding     ACCESS DEVICES:  [x] Necessity of urinary, arterial, and venous catheters discussed      LABS:                        9.8    15.94 )-----------( 159      ( 16 Nov 2024 02:44 )             28.5   11-16    138  |  103  |  20.7[H]  ----------------------------<  118[H]  4.0   |  23.0  |  0.63    Ca    7.7[L]      16 Nov 2024 02:44  Phos  3.6     11-16  Mg     2.4     11-16    TPro  6.1[L]  /  Alb  3.9  /  TBili  0.5  /  DBili  x   /  AST  32  /  ALT  46[H]  /  AlkPhos  60  11-15    CAPILLARY BLOOD GLUCOSE      MEDICATIONS:   MEDICATIONS  (STANDING):  amLODIPine   Tablet 5 milliGRAM(s) Oral daily  chlorhexidine 2% Cloths 1 Application(s) Topical daily  melatonin 5 milliGRAM(s) Oral at bedtime  multivitamin/minerals/iron Oral Solution (CENTRUM) 15 milliLiter(s) Oral daily  polyethylene glycol 3350 17 Gram(s) Oral daily  senna 2 Tablet(s) Oral at bedtime  tamsulosin 0.4 milliGRAM(s) Oral at bedtime    MEDICATIONS  (PRN):  acetaminophen   IVPB .. 1000 milliGRAM(s) IV Intermittent every 6 hours PRN Mild Pain (1 - 3), Moderate Pain (4 - 6), Severe Pain (7 - 10)  HYDROmorphone  Injectable 0.2 milliGRAM(s) IV Push every 4 hours PRN Breakthrough Pain  oxyCODONE    IR 2.5 milliGRAM(s) Oral every 4 hours PRN Moderate Pain (4 - 6)  oxyCODONE    IR 5 milliGRAM(s) Oral every 4 hours PRN Severe Pain (7 - 10)      < from: CT Angio Abdomen and Pelvis w/ IV Cont (11.15.24 @ 14:13) >  IMPRESSION:  Complex left pelvic fractures again seen with associated hemorrhage in   the anterior space of Retzius as well as intramuscular hematomas   involving the left obturator externus and internus muscles as seen on   study of earlier in the day without definite arterial extravasation of   contrast to suggest active bleeding.    < end of copied text >  < from: CT Ankle No Cont, Right (11.15.24 @ 14:12) >    IMPRESSION:  1.  No fractures are seen.    < end of copied text >  < from: Xray Knee 3 Views, Left (11.15.24 @ 10:24) >  IMPRESSION: Left inner acetabular fracture with pelvic hematoma.    Nondisplaced fracture of the proximal phalanx of the third right finger.    < end of copied text >  < from: Xray Hand 3 Views, Right (11.15.24 @ 10:23) >    Nondisplaced fracture of the proximal phalanx of the third right finger.    < end of copied text >  < from: CT Thoracic Spine Reform No Cont (11.15.24 @ 09:37) >  IMPRESSION:  < from: CT Abdomen and Pelvis w/ IV Cont (11.15.24 @ 09:27) >  IMPRESSION:  Multiple left-sided pelvic fractures, increased involving the ileum,   anterior and posterior acetabulum, pubic body and inferior pubic ramus.    Small amount of associated extraperitoneal pelvic hematoma. No active   bleeding is identified within the limitations of the CT angiography   technique. CT which was performed without    < end of copied text >  Mild compression deformities superior endplates of T3 and T4 which are   new from prior chest CT of 9/20/2024.    < end of copied text >    < from: CT Cervical Spine No Cont (11.15.24 @ 09:25) >  IMPRESSION:    CT HEAD:  1.  No evidence of acute intracranial hemorrhage or midline shift.  2.  Dystrophic calcification in the right temporal lobe, grossly stable   with adjacent focus of increased attenuation, more conspicuous when   compared prior imaging, however grossly stable in size.  3.  Large left frontal scalp hematoma and contusion  4.  Chronic small vessel disease    CT FACIAL BONES:  1.  No evidence of acute fracture of the facial bones.  2.  Small focal dehiscence of the right lamina papyracea.    CT CERVICAL SPINE:  1.  Reduced vertebral body heights in the cervical spine, most   significantly C5-C7. This is likely degenerative in nature, however a   compression fracture deformity is not entirely excluded. Recommend MRI of   the cervical spine for further evaluation.  2.  Multilevel degenerative change of the cervical spine, most   significantly at the C6 level where there is moderate to severe narrowing   of the spinal canal. In the setting of myelopathy, recommend MRI of the   cervical spine.    --- End of Report ---    < end of copied text >
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
## 2024-11-16 NOTE — DISCHARGE NOTE PROVIDER - CARE PROVIDER_API CALL
Grayson Mckeonul  Neurosurgery  [address] [place] [id number]  Phone: [phone]  Fax: [phone]  Follow Up Time:     Mehdi Munoz  Interventional Neuroradiology  [address] [place] [address]  Phone: [phone]  Fax: [phone]  Follow Up Time:     Jass Leung  Orthopaedic Surgery  [address] [place] [address]  Phone: [phone]  Fax: [phone]  Follow Up Time:

## 2024-11-16 NOTE — DISCHARGE NOTE PROVIDER - NSDCCPCAREPLAN_GEN_ALL_CORE_FT
PRINCIPAL DISCHARGE DIAGNOSIS  Diagnosis: Fracture of multiple bones  Assessment and Plan of Treatment:       SECONDARY DISCHARGE DIAGNOSES  Diagnosis: Calcification of brain  Assessment and Plan of Treatment: Sometimes during the process of diagnosis and treatment for one disorder, a second problem or abnormality is found. We call this an incidental finding. An incidental finding is not related to why you came into the hospital, but is something that should be followed up on after you leave the hospital.  1) Possible calfication in brain  2) Possible aneursym dilation at M2 within brain  During your care, the following incidental finding was identified and discussed with you or your surrogate:  Seen on: CT Head   This finding should be followed up by: Primary Care Provider     PRINCIPAL DISCHARGE DIAGNOSIS  Diagnosis: Pelvic fracture  Assessment and Plan of Treatment: Follow Up: Please call to make an appointment w/ Dr. Leung from Orthopedics 2-3 weeks after discharge. Also, please call to make an appointment with your primary care physician as per your usual schedule.   Activity: Toe touch weigh bearing to Left lower extremity.  Weight bearing as tolerated to Right lower extremity.  Non-Weight bearing to Right upper extremity in splint.  C-Collar with thoracic extension brace when out of bed.  Diet: May continue regular diet.  Medications: Please take all home medications as prescribed by your primary care doctor. You are encouraged to take over-the-counter tylenol and/or ibuprofen for pain relief when you feel your pain no longer warrants the use of narcotic pain medications.  Patient is advised to RETURN TO THE EMERGENCY DEPARTMENT for any of the following - worsening pain, fever/chills, nausea/vomiting, alterned mental status, chest pain, shortness of breath, or any other new/worsening symptoms.      SECONDARY DISCHARGE DIAGNOSES  Diagnosis: Calcification of brain  Assessment and Plan of Treatment: Sometimes during the process of diagnosis and treatment for one disorder, a second problem or abnormality is found. We call this an incidental finding. An incidental finding is not related to why you came into the hospital, but is something that should be followed up on after you leave the hospital.  1) Possible calfication in brain  2) Possible aneursym dilation at M2 within brain  During your care, the following incidental finding was identified and discussed with you or your surrogate:  Seen on: CT Head   This finding should be followed up by: Dr. Munoz from Interventional Neurology     intense desire for tobacco/irritability
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
## 2024-11-16 NOTE — DISCHARGE NOTE PROVIDER - NSDCMRMEDTOKEN_GEN_ALL_CORE_FT
amLODIPine 5 mg oral tablet: 1 tab(s) orally once a day   benzonatate 100 mg oral capsule: 1 cap(s) orally 3 times a day   acetaminophen 325 mg oral tablet: 2 tab(s) orally every 6 hours  amLODIPine 5 mg oral tablet: 1 tab(s) orally once a day   benzonatate 100 mg oral capsule: 1 cap(s) orally 3 times a day  bisacodyl 10 mg rectal suppository: 1 suppository(ies) rectal once a day As needed Constipation  melatonin 5 mg oral tablet: 1 tab(s) orally once a day (at bedtime)  Multiple Vitamins with Minerals oral liquid: 15 milliliter(s) orally once a day  ocular lubricant ophthalmic solution: 1 drop(s) to each affected eye 3 times a day As needed Dry Eyes  oxyCODONE 5 mg oral tablet: 1 tab(s) orally every 4 hours As needed Severe Pain (7 - 10)  polyethylene glycol 3350 oral powder for reconstitution: 17 gram(s) orally once a day  senna leaf extract oral tablet: 2 tab(s) orally once a day (at bedtime)  simethicone 80 mg oral tablet, chewable: 1 tab(s) orally 3 times a day As needed Gas  tamsulosin 0.4 mg oral capsule: 1 cap(s) orally once a day (at bedtime)   acetaminophen 325 mg oral tablet: 2 tab(s) orally every 6 hours  amLODIPine 5 mg oral tablet: 1 tab(s) orally once a day   benzonatate 100 mg oral capsule: 1 cap(s) orally 3 times a day  bisacodyl 10 mg rectal suppository: 1 suppository(ies) rectal once a day As needed Constipation  collagenase 250 units/g topical ointment: 1 Apply topically to affected area once a day  lidocaine 4% topical film: Apply topically to affected area once a day  melatonin 5 mg oral tablet: 1 tab(s) orally once a day (at bedtime)  Multiple Vitamins with Minerals oral liquid: 15 milliliter(s) orally once a day  ocular lubricant ophthalmic solution: 1 drop(s) to each affected eye 3 times a day As needed Dry Eyes  oxyCODONE 5 mg oral tablet: 1 tab(s) orally every 4 hours As needed Severe Pain (7 - 10)  polyethylene glycol 3350 oral powder for reconstitution: 17 gram(s) orally once a day  senna leaf extract oral tablet: 2 tab(s) orally once a day (at bedtime)  simethicone 80 mg oral tablet, chewable: 1 tab(s) orally 3 times a day As needed Gas  tamsulosin 0.4 mg oral capsule: 1 cap(s) orally once a day (at bedtime)

## 2024-11-16 NOTE — DISCHARGE NOTE PROVIDER - NSDCFUADDINST_GEN_ALL_CORE_FT
Toe touch weigh bearing to Left lower extremity.  Weight bearing as tolerated to Right lower extremity.  Non-Weight bearing to Right upper extremity in splint.  C-Collar with thoracic extension brace when out of bed.
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
## 2024-11-16 NOTE — DISCHARGE NOTE PROVIDER - CARE PROVIDERS DIRECT ADDRESSES
[email or web address]

## 2024-11-16 NOTE — CHART NOTE - NSCHARTNOTEFT_GEN_A_CORE
Tertiary Trauma Survey (TTS)    Date of TTS: 11-16-24 @ 13:21                             Admit Date: 11-15-24 @ 12:55      Trauma Activation: B    List Injuries Identified to Date:  L Sided Pelvic Fx: Ileum, Acetabulum, Pubic Body, Inferior Pubic Rami  EP Hematoma  Possible C5-C7 Compression Fx  0.4cm Aneurysm M2 Bifurcation  T3-T4 Endplate Compression Fx  Lamina Papyracea Dehiscence  Nondisplaced Third Phalanx Fx       List Operative and Interventional Radiological Procedures:     Physical Exam:    Neuro: A and Ox3, NAD, Nonfocal. Upper and Lower Extremities Sensory/Motor intact B/L.    HEENT: Traumatic head. Periorbital Ecchymosis B/L. L sided hematoma, scattered abrasions..    Pulm/Chest: CTA B/L, equal rise and fall of the chest.    Cardiac: NSR, S1/S2.    GI / Abdomen: Nontender, nondistended.     Musculoskeletal / Extremities:. TTP B/L hip, scattered ecchymosis. L sided knee pain w/ flexion. R sided ankle pain. RUE in splint. LUE atraumatic. Nontender T or L spine.     RADIOLOGICAL FINDINGS REVIEW:  XR:     IMPRESSION: Mild bibasilar atelectasis    CT HEAD:  1. No evidence of acute intracranial hemorrhage or midline shift.  2. Dystrophic calcification in the right temporal lobe, grossly stable with adjacent focus of increased attenuation, more conspicuous when compared prior imaging, however grossly stable in size.  3. Large left frontal scalp hematoma and contusion  4. Chronic small vessel disease    CT FACIAL BONES:  1. No evidence of acute fracture of the facial bones.  2. Small focal dehiscence of the right lamina papyracea.    CT CERVICAL SPINE:  1. Reduced vertebral body heights in the cervical spine, most significantly C5-C7. This is likely degenerative in nature, however a compression fracture deformity is not entirely excluded. Recommend MRI of the cervical spine for further evaluation.  2. Multilevel degenerative change of the cervical spine, most significantly at the C6 level where there is moderate to severe narrowing of the spinal canal. In the setting of myelopathy, recommend MRI of the cervical spine.    CT T/L spine  IMPRESSION:  Mild compression deformities superior endplates of T3 and T4 which are new from prior chest CT of 9/20/2024.  Chronic grade 1 anterolisthesis L5 on S1.  Transitional lumbosacral vertebra which was considered S1.  Left-sided pelvic fractures partly seen.    CTA BRAIN:  0.4 cm aneurysmal change at the right M2 bifurcation.    CTA NECK:  No evidence of critical stenosis by NASCET criteria.    CT Chest, Abd, Pelv  IMPRESSION:  Multiple left-sided pelvic fractures, increased involving the ileum, anterior and posterior acetabulum, pubic body and inferior pubic ramus.    Small amount of associated extraperitoneal pelvic hematoma. No active bleeding is identified within the limitations of the CT angiography technique. CT which was performed without    XR Pelvis and Hand and R Ankle and L Knee  IMPRESSION: Left inner acetabular fracture with pelvic hematoma.    Nondisplaced fracture of the proximal phalanx of the third right finger.    CT Ankle R  IMPRESSION:  1. No fractures are seen.    MR Brain, C spine L Spine PENDING  INCIDENTAL FINDINGS:    [ ] No    [x] Yes, Findings are:  Dystrophic calcification in the right temporal lobe  0.4 cm aneurysmal change at the right M2 bifurcation.    Discussed w/ patient at the bedside. MR ordered for brain lesion. Neuro IR consulted and NTD for aneurysmal change at M2    [x] Tertiary exam done, new injuries identified are:  Knee TTP B/L. L XR NEG, R ordered.               [x] Imaging ordered: R Knee XR

## 2024-11-16 NOTE — PROGRESS NOTE ADULT - NS ATTEND AMEND GEN_ALL_CORE FT
NSGY Attg:    see above    patient seen and examined    agree with above    plan of care determined for cervical VB height loss and brain lesion  MRIs performed, official reports pending  will f/u official reports

## 2024-11-16 NOTE — DISCHARGE NOTE PROVIDER - PROVIDER TOKENS
PROVIDER:[TOKEN:[[id number]]],PROVIDER:[TOKEN:[[id number]]],PROVIDER:[TOKEN:[[id number]]]

## 2024-11-17 LAB
ANION GAP SERPL CALC-SCNC: 10 MMOL/L — SIGNIFICANT CHANGE UP (ref 5–17)
ANISOCYTOSIS BLD QL: SLIGHT — SIGNIFICANT CHANGE UP
BASOPHILS # BLD AUTO: 0 K/UL — SIGNIFICANT CHANGE UP (ref 0–0.2)
BASOPHILS NFR BLD AUTO: 0 % — SIGNIFICANT CHANGE UP (ref 0–2)
BUN SERPL-MCNC: 25 MG/DL — HIGH (ref 8–20)
CALCIUM SERPL-MCNC: 7.5 MG/DL — LOW (ref 8.4–10.5)
CHLORIDE SERPL-SCNC: 103 MMOL/L — SIGNIFICANT CHANGE UP (ref 96–108)
CO2 SERPL-SCNC: 23 MMOL/L — SIGNIFICANT CHANGE UP (ref 22–29)
CREAT SERPL-MCNC: 0.61 MG/DL — SIGNIFICANT CHANGE UP (ref 0.5–1.3)
EGFR: 105 ML/MIN/1.73M2 — SIGNIFICANT CHANGE UP
EOSINOPHIL # BLD AUTO: 0.26 K/UL — SIGNIFICANT CHANGE UP (ref 0–0.5)
EOSINOPHIL NFR BLD AUTO: 2.6 % — SIGNIFICANT CHANGE UP (ref 0–6)
GIANT PLATELETS BLD QL SMEAR: PRESENT — SIGNIFICANT CHANGE UP
GLUCOSE SERPL-MCNC: 99 MG/DL — SIGNIFICANT CHANGE UP (ref 70–99)
HCT VFR BLD CALC: 24.9 % — LOW (ref 39–50)
HGB BLD-MCNC: 8.5 G/DL — LOW (ref 13–17)
LYMPHOCYTES # BLD AUTO: 1.65 K/UL — SIGNIFICANT CHANGE UP (ref 1–3.3)
LYMPHOCYTES # BLD AUTO: 16.4 % — SIGNIFICANT CHANGE UP (ref 13–44)
MAGNESIUM SERPL-MCNC: 2 MG/DL — SIGNIFICANT CHANGE UP (ref 1.6–2.6)
MANUAL SMEAR VERIFICATION: SIGNIFICANT CHANGE UP
MCHC RBC-ENTMCNC: 31.1 PG — SIGNIFICANT CHANGE UP (ref 27–34)
MCHC RBC-ENTMCNC: 34.1 G/DL — SIGNIFICANT CHANGE UP (ref 32–36)
MCV RBC AUTO: 91.2 FL — SIGNIFICANT CHANGE UP (ref 80–100)
MICROCYTES BLD QL: SLIGHT — SIGNIFICANT CHANGE UP
MONOCYTES # BLD AUTO: 0.43 K/UL — SIGNIFICANT CHANGE UP (ref 0–0.9)
MONOCYTES NFR BLD AUTO: 4.3 % — SIGNIFICANT CHANGE UP (ref 2–14)
MYELOCYTES NFR BLD: 0.9 % — HIGH (ref 0–0)
NEUTROPHILS # BLD AUTO: 7.55 K/UL — HIGH (ref 1.8–7.4)
NEUTROPHILS NFR BLD AUTO: 73.3 % — SIGNIFICANT CHANGE UP (ref 43–77)
NEUTS BAND # BLD: 1.7 % — SIGNIFICANT CHANGE UP (ref 0–8)
PHOSPHATE SERPL-MCNC: 3.1 MG/DL — SIGNIFICANT CHANGE UP (ref 2.4–4.7)
PLAT MORPH BLD: NORMAL — SIGNIFICANT CHANGE UP
PLATELET # BLD AUTO: 130 K/UL — LOW (ref 150–400)
POLYCHROMASIA BLD QL SMEAR: SLIGHT — SIGNIFICANT CHANGE UP
POTASSIUM SERPL-MCNC: 3.7 MMOL/L — SIGNIFICANT CHANGE UP (ref 3.5–5.3)
POTASSIUM SERPL-SCNC: 3.7 MMOL/L — SIGNIFICANT CHANGE UP (ref 3.5–5.3)
RBC # BLD: 2.73 M/UL — LOW (ref 4.2–5.8)
RBC # FLD: 12.7 % — SIGNIFICANT CHANGE UP (ref 10.3–14.5)
RBC BLD AUTO: ABNORMAL
SODIUM SERPL-SCNC: 136 MMOL/L — SIGNIFICANT CHANGE UP (ref 135–145)
VARIANT LYMPHS # BLD: 0.8 % — SIGNIFICANT CHANGE UP (ref 0–6)
WBC # BLD: 10.06 K/UL — SIGNIFICANT CHANGE UP (ref 3.8–10.5)
WBC # FLD AUTO: 10.06 K/UL — SIGNIFICANT CHANGE UP (ref 3.8–10.5)

## 2024-11-17 PROCEDURE — 99232 SBSQ HOSP IP/OBS MODERATE 35: CPT

## 2024-11-17 PROCEDURE — 99233 SBSQ HOSP IP/OBS HIGH 50: CPT

## 2024-11-17 RX ORDER — ACETAMINOPHEN 500MG 500 MG/1
650 TABLET, COATED ORAL EVERY 6 HOURS
Refills: 0 | Status: DISCONTINUED | OUTPATIENT
Start: 2024-11-17 | End: 2024-11-22

## 2024-11-17 RX ORDER — POTASSIUM CHLORIDE 600 MG/1
40 TABLET, EXTENDED RELEASE ORAL ONCE
Refills: 0 | Status: COMPLETED | OUTPATIENT
Start: 2024-11-17 | End: 2024-11-17

## 2024-11-17 RX ORDER — ACETAMINOPHEN 500MG 500 MG/1
1000 TABLET, COATED ORAL ONCE
Refills: 0 | Status: COMPLETED | OUTPATIENT
Start: 2024-11-17 | End: 2024-11-17

## 2024-11-17 RX ADMIN — ACETAMINOPHEN 500MG 1000 MILLIGRAM(S): 500 TABLET, COATED ORAL at 21:00

## 2024-11-17 RX ADMIN — ACETAMINOPHEN 500MG 1000 MILLIGRAM(S): 500 TABLET, COATED ORAL at 12:00

## 2024-11-17 RX ADMIN — TAMSULOSIN HYDROCHLORIDE 0.4 MILLIGRAM(S): 0.4 CAPSULE ORAL at 20:14

## 2024-11-17 RX ADMIN — ACETAMINOPHEN 500MG 400 MILLIGRAM(S): 500 TABLET, COATED ORAL at 11:30

## 2024-11-17 RX ADMIN — CHLORHEXIDINE GLUCONATE 1 APPLICATION(S): 1.2 RINSE ORAL at 20:02

## 2024-11-17 RX ADMIN — ACETAMINOPHEN 500MG 400 MILLIGRAM(S): 500 TABLET, COATED ORAL at 20:13

## 2024-11-17 RX ADMIN — ACETAMINOPHEN 500MG 400 MILLIGRAM(S): 500 TABLET, COATED ORAL at 05:28

## 2024-11-17 RX ADMIN — Medication 15 MILLILITER(S): at 11:30

## 2024-11-17 RX ADMIN — ACETAMINOPHEN 500MG 1000 MILLIGRAM(S): 500 TABLET, COATED ORAL at 06:00

## 2024-11-17 RX ADMIN — ACETAMINOPHEN, DIPHENHYDRAMINE HCL, PHENYLEPHRINE HCL 5 MILLIGRAM(S): 325; 25; 5 TABLET ORAL at 20:14

## 2024-11-17 RX ADMIN — Medication 10 MILLIGRAM(S): at 18:57

## 2024-11-17 RX ADMIN — ENOXAPARIN SODIUM 30 MILLIGRAM(S): 30 INJECTION SUBCUTANEOUS at 05:25

## 2024-11-17 RX ADMIN — OXYCODONE HYDROCHLORIDE 5 MILLIGRAM(S): 30 TABLET ORAL at 23:31

## 2024-11-17 RX ADMIN — ENOXAPARIN SODIUM 30 MILLIGRAM(S): 30 INJECTION SUBCUTANEOUS at 18:57

## 2024-11-17 RX ADMIN — POLYETHYLENE GLYCOL 3350 17 GRAM(S): 17 POWDER, FOR SOLUTION ORAL at 11:29

## 2024-11-17 RX ADMIN — POTASSIUM CHLORIDE 40 MILLIEQUIVALENT(S): 600 TABLET, EXTENDED RELEASE ORAL at 05:25
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
## 2024-11-17 NOTE — PROGRESS NOTE ADULT - SUBJECTIVE AND OBJECTIVE BOX
Patient seen and examined at bedside.     MEDICATIONS  (STANDING):  amLODIPine   Tablet 5 milliGRAM(s) Oral daily  chlorhexidine 2% Cloths 1 Application(s) Topical daily  enoxaparin Injectable 30 milliGRAM(s) SubCutaneous every 12 hours  melatonin 5 milliGRAM(s) Oral at bedtime  multivitamin/minerals/iron Oral Solution (CENTRUM) 15 milliLiter(s) Oral daily  polyethylene glycol 3350 17 Gram(s) Oral daily  senna 2 Tablet(s) Oral at bedtime  tamsulosin 0.4 milliGRAM(s) Oral at bedtime    MEDICATIONS  (PRN):  acetaminophen   IVPB .. 1000 milliGRAM(s) IV Intermittent every 6 hours PRN Mild Pain (1 - 3), Moderate Pain (4 - 6), Severe Pain (7 - 10)  artificial tears (preservative free) Ophthalmic Solution 1 Drop(s) Both EYES three times a day PRN Dry Eyes  bisacodyl Suppository 10 milliGRAM(s) Rectal daily PRN Constipation  HYDROmorphone  Injectable 0.2 milliGRAM(s) IV Push every 4 hours PRN Breakthrough Pain  oxyCODONE    IR 2.5 milliGRAM(s) Oral every 4 hours PRN Moderate Pain (4 - 6)  oxyCODONE    IR 5 milliGRAM(s) Oral every 4 hours PRN Severe Pain (7 - 10)                          8.5    10.06 )-----------( 130      ( 17 Nov 2024 02:15 )             24.9     11-17    136  |  103  |  25.0[H]  ----------------------------<  99  3.7   |  23.0  |  0.61    Ca    7.5[L]      17 Nov 2024 02:15  Phos  3.1     11-17  Mg     2.0     11-17      I&O's Detail    16 Nov 2024 07:01  -  17 Nov 2024 07:00  --------------------------------------------------------  IN:    IV PiggyBack: 200 mL  Total IN: 200 mL    OUT:    Incontinent per Condom Catheter (mL): 600 mL    Voided (mL): 870 mL  Total OUT: 1470 mL    Total NET: -1270 mL    Vital Signs Last 24 Hrs  T(C): 37 (16 Nov 2024 23:50), Max: 37 (16 Nov 2024 23:50)  T(F): 98.6 (16 Nov 2024 23:50), Max: 98.6 (16 Nov 2024 23:50)  HR: 95 (17 Nov 2024 08:00) (65 - 96)  BP: 91/65 (17 Nov 2024 08:00) (87/64 - 115/79)  BP(mean): 75 (17 Nov 2024 08:00) (73 - 91)  RR: 13 (17 Nov 2024 08:00) (12 - 21)  SpO2: 98% (17 Nov 2024 08:00) (95% - 100%)    Parameters below as of 17 Nov 2024 08:00  Patient On (Oxygen Delivery Method): nasal cannula  O2 Flow (L/min): 2      PHYSICAL EXAM:    NEURO  Alert and oriented x 3  no gross motor deficit   Motor reduced due to pain       RESPIRATORY  Exam:  CTA    CARDIOVASCULAR  RRR. intermittent tachycardia due to pain    GI/NUTRITION  Soft NT ND    GENITOURINARY  No swelling or ecchymosis currently  Still with subjective incomplete voiding      Patient seen and examined at bedside. No acute events, pain improved.     MEDICATIONS  (STANDING):  amLODIPine   Tablet 5 milliGRAM(s) Oral daily  chlorhexidine 2% Cloths 1 Application(s) Topical daily  enoxaparin Injectable 30 milliGRAM(s) SubCutaneous every 12 hours  melatonin 5 milliGRAM(s) Oral at bedtime  multivitamin/minerals/iron Oral Solution (CENTRUM) 15 milliLiter(s) Oral daily  polyethylene glycol 3350 17 Gram(s) Oral daily  senna 2 Tablet(s) Oral at bedtime  tamsulosin 0.4 milliGRAM(s) Oral at bedtime    MEDICATIONS  (PRN):  acetaminophen   IVPB .. 1000 milliGRAM(s) IV Intermittent every 6 hours PRN Mild Pain (1 - 3), Moderate Pain (4 - 6), Severe Pain (7 - 10)  artificial tears (preservative free) Ophthalmic Solution 1 Drop(s) Both EYES three times a day PRN Dry Eyes  bisacodyl Suppository 10 milliGRAM(s) Rectal daily PRN Constipation  HYDROmorphone  Injectable 0.2 milliGRAM(s) IV Push every 4 hours PRN Breakthrough Pain  oxyCODONE    IR 2.5 milliGRAM(s) Oral every 4 hours PRN Moderate Pain (4 - 6)  oxyCODONE    IR 5 milliGRAM(s) Oral every 4 hours PRN Severe Pain (7 - 10)                          8.5    10.06 )-----------( 130      ( 17 Nov 2024 02:15 )             24.9     11-17    136  |  103  |  25.0[H]  ----------------------------<  99  3.7   |  23.0  |  0.61    Ca    7.5[L]      17 Nov 2024 02:15  Phos  3.1     11-17  Mg     2.0     11-17      I&O's Detail    16 Nov 2024 07:01  -  17 Nov 2024 07:00  --------------------------------------------------------  IN:    IV PiggyBack: 200 mL  Total IN: 200 mL    OUT:    Incontinent per Condom Catheter (mL): 600 mL    Voided (mL): 870 mL  Total OUT: 1470 mL    Total NET: -1270 mL    Vital Signs Last 24 Hrs  T(C): 37 (16 Nov 2024 23:50), Max: 37 (16 Nov 2024 23:50)  T(F): 98.6 (16 Nov 2024 23:50), Max: 98.6 (16 Nov 2024 23:50)  HR: 95 (17 Nov 2024 08:00) (65 - 96)  BP: 91/65 (17 Nov 2024 08:00) (87/64 - 115/79)  BP(mean): 75 (17 Nov 2024 08:00) (73 - 91)  RR: 13 (17 Nov 2024 08:00) (12 - 21)  SpO2: 98% (17 Nov 2024 08:00) (95% - 100%)    Parameters below as of 17 Nov 2024 08:00  Patient On (Oxygen Delivery Method): nasal cannula  O2 Flow (L/min): 2      PHYSICAL EXAM:    NEURO  Alert and oriented x 3  no gross motor deficit   Motor reduced due to pain       RESPIRATORY  Exam:  CTA    CARDIOVASCULAR  RRR. intermittent tachycardia due to pain    GI/NUTRITION  Soft NT ND    GENITOURINARY  No swelling or ecchymosis currently  Still with subjective incomplete voiding      Patient seen and examined at bedside. No acute events, pain improved.   MRI completed - T2-4 endplate fractures     MEDICATIONS  (STANDING):  amLODIPine   Tablet 5 milliGRAM(s) Oral daily  chlorhexidine 2% Cloths 1 Application(s) Topical daily  enoxaparin Injectable 30 milliGRAM(s) SubCutaneous every 12 hours  melatonin 5 milliGRAM(s) Oral at bedtime  multivitamin/minerals/iron Oral Solution (CENTRUM) 15 milliLiter(s) Oral daily  polyethylene glycol 3350 17 Gram(s) Oral daily  senna 2 Tablet(s) Oral at bedtime  tamsulosin 0.4 milliGRAM(s) Oral at bedtime    MEDICATIONS  (PRN):  acetaminophen   IVPB .. 1000 milliGRAM(s) IV Intermittent every 6 hours PRN Mild Pain (1 - 3), Moderate Pain (4 - 6), Severe Pain (7 - 10)  artificial tears (preservative free) Ophthalmic Solution 1 Drop(s) Both EYES three times a day PRN Dry Eyes  bisacodyl Suppository 10 milliGRAM(s) Rectal daily PRN Constipation  HYDROmorphone  Injectable 0.2 milliGRAM(s) IV Push every 4 hours PRN Breakthrough Pain  oxyCODONE    IR 2.5 milliGRAM(s) Oral every 4 hours PRN Moderate Pain (4 - 6)  oxyCODONE    IR 5 milliGRAM(s) Oral every 4 hours PRN Severe Pain (7 - 10)                          8.5    10.06 )-----------( 130      ( 17 Nov 2024 02:15 )             24.9     11-17    136  |  103  |  25.0[H]  ----------------------------<  99  3.7   |  23.0  |  0.61    Ca    7.5[L]      17 Nov 2024 02:15  Phos  3.1     11-17  Mg     2.0     11-17      I&O's Detail    16 Nov 2024 07:01  -  17 Nov 2024 07:00  --------------------------------------------------------  IN:    IV PiggyBack: 200 mL  Total IN: 200 mL    OUT:    Incontinent per Condom Catheter (mL): 600 mL    Voided (mL): 870 mL  Total OUT: 1470 mL    Total NET: -1270 mL    Vital Signs Last 24 Hrs  T(C): 37 (16 Nov 2024 23:50), Max: 37 (16 Nov 2024 23:50)  T(F): 98.6 (16 Nov 2024 23:50), Max: 98.6 (16 Nov 2024 23:50)  HR: 95 (17 Nov 2024 08:00) (65 - 96)  BP: 91/65 (17 Nov 2024 08:00) (87/64 - 115/79)  BP(mean): 75 (17 Nov 2024 08:00) (73 - 91)  RR: 13 (17 Nov 2024 08:00) (12 - 21)  SpO2: 98% (17 Nov 2024 08:00) (95% - 100%)    Parameters below as of 17 Nov 2024 08:00  Patient On (Oxygen Delivery Method): nasal cannula  O2 Flow (L/min): 2      PHYSICAL EXAM:    NEURO  Alert and oriented x 3  no gross motor deficit   Motor reduced due to pain       RESPIRATORY  Exam:  CTA    CARDIOVASCULAR  RRR. intermittent tachycardia due to pain    GI/NUTRITION  Soft NT ND    GENITOURINARY  No swelling or ecchymosis currently  Still with subjective incomplete voiding
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
## 2024-11-17 NOTE — PROGRESS NOTE ADULT - NS ATTEND AMEND GEN_ALL_CORE FT
NSGY Attg:    see above    patient see and examined    agree with above    plan of care determined for mild thoracic compression fractures  collar with extension when OOB  no acute neurosurgical intervention  recall prn
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
## 2024-11-17 NOTE — CHART NOTE - NSCHARTNOTEFT_GEN_A_CORE
Fit and adjusted Aspen  prescribed by patients physician. Patients sons were present. Nurse assisted with fitting. Adjusted height and circumference of collar to patient comfort. Adjusted length of anterior and posterior sections to proper height. Explained for OOB as per physician. Explained to always wear a article of clothing to buffer skin. Gave card  [facility]  [phone]

## 2024-11-17 NOTE — PROGRESS NOTE ADULT - SUBJECTIVE AND OBJECTIVE BOX
[facility]    [id number]    History:  The patient is status postreduction and splinting of right 3rd digit proximal phalanx fracture, Left ileum, Inferior pubic rami, acetabular fracture.  Family at bedside.  Patient is alert and responsive. The patient's pain is controlled using the prescribed pain medications. The patient is able to  participate in physical therapy. Denies nausea, vomiting, chest pain, shortness of breath, abdominal pain or fever. No new complaints. No acute motor or sensory changes are reported.    Vital Signs Last 24 Hrs  T(C): 37 (16 Nov 2024 23:50), Max: 37 (16 Nov 2024 23:50)  T(F): 98.6 (16 Nov 2024 23:50), Max: 98.6 (16 Nov 2024 23:50)  HR: 93 (17 Nov 2024 09:00) (65 - 96)  BP: 85/58 (17 Nov 2024 09:00) (85/58 - 115/79)  BP(mean): 67 (17 Nov 2024 09:00) (67 - 91)  RR: 16 (17 Nov 2024 09:00) (12 - 21)  SpO2: 97% (17 Nov 2024 09:00) (95% - 100%)    Parameters below as of 17 Nov 2024 08:00  Patient On (Oxygen Delivery Method): nasal cannula  O2 Flow (L/min): 2    I&O's Summary    16 Nov 2024 07:01  -  17 Nov 2024 07:00  --------------------------------------------------------  IN: 200 mL / OUT: 1470 mL / NET: -1270 mL    17 Nov 2024 07:01  -  17 Nov 2024 10:21  --------------------------------------------------------  IN: 0 mL / OUT: 220 mL / NET: -220 mL                              8.5    10.06 )-----------( 130      ( 17 Nov 2024 02:15 )             24.9     11-17    136  |  103  |  25.0[H]  ----------------------------<  99  3.7   |  23.0  |  0.61    Ca    7.5[L]      17 Nov 2024 02:15  Phos  3.1     11-17  Mg     2.0     11-17        MEDICATIONS  (STANDING):  amLODIPine   Tablet 5 milliGRAM(s) Oral daily  chlorhexidine 2% Cloths 1 Application(s) Topical daily  enoxaparin Injectable 30 milliGRAM(s) SubCutaneous every 12 hours  melatonin 5 milliGRAM(s) Oral at bedtime  multivitamin/minerals/iron Oral Solution (CENTRUM) 15 milliLiter(s) Oral daily  polyethylene glycol 3350 17 Gram(s) Oral daily  senna 2 Tablet(s) Oral at bedtime  tamsulosin 0.4 milliGRAM(s) Oral at bedtime    MEDICATIONS  (PRN):  acetaminophen   IVPB .. 1000 milliGRAM(s) IV Intermittent every 6 hours PRN Mild Pain (1 - 3), Moderate Pain (4 - 6), Severe Pain (7 - 10)  artificial tears (preservative free) Ophthalmic Solution 1 Drop(s) Both EYES three times a day PRN Dry Eyes  bisacodyl Suppository 10 milliGRAM(s) Rectal daily PRN Constipation  HYDROmorphone  Injectable 0.2 milliGRAM(s) IV Push every 4 hours PRN Breakthrough Pain  oxyCODONE    IR 2.5 milliGRAM(s) Oral every 4 hours PRN Moderate Pain (4 - 6)  oxyCODONE    IR 5 milliGRAM(s) Oral every 4 hours PRN Severe Pain (7 - 10)      Physical exam: Well padded splint is in good position to right upper extremity.   dressing is clean, dry and intact. Tenderness to palpation over left side pelvis.  Sensation to light touch is grossly intact without focal deficit and is symmetric bilaterally. No calf tenderness. Sensation to light touch is grossly intact distally. Motor function distally is 5/5. No foot drop. 2+ dorsalis pedis pulse. Capillary refill is less than 2 seconds. No cyanosis.  Patient can participate in PT, TTWB LLE, if unable to do , may WBAT.  Right ankle sprain appreciated, so bearing witht on right ankle may be difficult. No fractures noted to Right ankle.     Primary Orthopedic Assessment:  •   Secondary  Orthopedic Assessment(s):   •   Secondary  Medical Assessment(s):   •   Plan:   • DVT prophylaxis as prescribed, including use of compression devices and ankle pumps  • Continue physical therapy  • Non-weight bearing of the splinted right upper extremity  • Continue splint as applied  • Elevation of the splinted extremity  Patient can participate in PT, TTWB LLE, if unable to do , may WBAT.  Right ankle sprain appreciated, so bearing witht on right ankle may be difficult. No fractures noted to Right ankle.  If unable to advance with PT, we may consider surgery for pelvic stabilization in the future to help mobilize.   • Pain control as clinically indicated  • Incentive spirometry encouraged  2 sons at bedside, all orthopedic questions and concerns answered.

## 2024-11-18 LAB
ANION GAP SERPL CALC-SCNC: 12 MMOL/L — SIGNIFICANT CHANGE UP (ref 5–17)
BASOPHILS # BLD AUTO: 0.05 K/UL — SIGNIFICANT CHANGE UP (ref 0–0.2)
BASOPHILS NFR BLD AUTO: 0.6 % — SIGNIFICANT CHANGE UP (ref 0–2)
BUN SERPL-MCNC: 17.2 MG/DL — SIGNIFICANT CHANGE UP (ref 8–20)
CALCIUM SERPL-MCNC: 8.1 MG/DL — LOW (ref 8.4–10.5)
CHLORIDE SERPL-SCNC: 102 MMOL/L — SIGNIFICANT CHANGE UP (ref 96–108)
CO2 SERPL-SCNC: 23 MMOL/L — SIGNIFICANT CHANGE UP (ref 22–29)
CREAT SERPL-MCNC: 0.59 MG/DL — SIGNIFICANT CHANGE UP (ref 0.5–1.3)
EGFR: 106 ML/MIN/1.73M2 — SIGNIFICANT CHANGE UP
EOSINOPHIL # BLD AUTO: 0.22 K/UL — SIGNIFICANT CHANGE UP (ref 0–0.5)
EOSINOPHIL NFR BLD AUTO: 2.6 % — SIGNIFICANT CHANGE UP (ref 0–6)
GLUCOSE SERPL-MCNC: 101 MG/DL — HIGH (ref 70–99)
HCT VFR BLD CALC: 26.9 % — LOW (ref 39–50)
HGB BLD-MCNC: 9.3 G/DL — LOW (ref 13–17)
IMM GRANULOCYTES NFR BLD AUTO: 4.3 % — HIGH (ref 0–0.9)
LYMPHOCYTES # BLD AUTO: 1.82 K/UL — SIGNIFICANT CHANGE UP (ref 1–3.3)
LYMPHOCYTES # BLD AUTO: 21.6 % — SIGNIFICANT CHANGE UP (ref 13–44)
MAGNESIUM SERPL-MCNC: 1.9 MG/DL — SIGNIFICANT CHANGE UP (ref 1.8–2.6)
MCHC RBC-ENTMCNC: 31.5 PG — SIGNIFICANT CHANGE UP (ref 27–34)
MCHC RBC-ENTMCNC: 34.6 G/DL — SIGNIFICANT CHANGE UP (ref 32–36)
MCV RBC AUTO: 91.2 FL — SIGNIFICANT CHANGE UP (ref 80–100)
MONOCYTES # BLD AUTO: 0.65 K/UL — SIGNIFICANT CHANGE UP (ref 0–0.9)
MONOCYTES NFR BLD AUTO: 7.7 % — SIGNIFICANT CHANGE UP (ref 2–14)
NEUTROPHILS # BLD AUTO: 5.34 K/UL — SIGNIFICANT CHANGE UP (ref 1.8–7.4)
NEUTROPHILS NFR BLD AUTO: 63.2 % — SIGNIFICANT CHANGE UP (ref 43–77)
PHOSPHATE SERPL-MCNC: 3.5 MG/DL — SIGNIFICANT CHANGE UP (ref 2.4–4.7)
PLATELET # BLD AUTO: 129 K/UL — LOW (ref 150–400)
POTASSIUM SERPL-MCNC: 3.9 MMOL/L — SIGNIFICANT CHANGE UP (ref 3.5–5.3)
POTASSIUM SERPL-SCNC: 3.9 MMOL/L — SIGNIFICANT CHANGE UP (ref 3.5–5.3)
RBC # BLD: 2.95 M/UL — LOW (ref 4.2–5.8)
RBC # FLD: 12.7 % — SIGNIFICANT CHANGE UP (ref 10.3–14.5)
SODIUM SERPL-SCNC: 137 MMOL/L — SIGNIFICANT CHANGE UP (ref 135–145)
WBC # BLD: 8.44 K/UL — SIGNIFICANT CHANGE UP (ref 3.8–10.5)
WBC # FLD AUTO: 8.44 K/UL — SIGNIFICANT CHANGE UP (ref 3.8–10.5)

## 2024-11-18 PROCEDURE — 99232 SBSQ HOSP IP/OBS MODERATE 35: CPT

## 2024-11-18 RX ORDER — SIMETHICONE 125 MG
80 CAPSULE ORAL THREE TIMES A DAY
Refills: 0 | Status: DISCONTINUED | OUTPATIENT
Start: 2024-11-18 | End: 2024-11-22

## 2024-11-18 RX ADMIN — ACETAMINOPHEN 500MG 650 MILLIGRAM(S): 500 TABLET, COATED ORAL at 18:30

## 2024-11-18 RX ADMIN — ENOXAPARIN SODIUM 30 MILLIGRAM(S): 30 INJECTION SUBCUTANEOUS at 18:31

## 2024-11-18 RX ADMIN — Medication 80 MILLIGRAM(S): at 12:40

## 2024-11-18 RX ADMIN — TAMSULOSIN HYDROCHLORIDE 0.4 MILLIGRAM(S): 0.4 CAPSULE ORAL at 22:24

## 2024-11-18 RX ADMIN — ACETAMINOPHEN 500MG 650 MILLIGRAM(S): 500 TABLET, COATED ORAL at 05:02

## 2024-11-18 RX ADMIN — AMLODIPINE BESYLATE 5 MILLIGRAM(S): 10 TABLET ORAL at 05:02

## 2024-11-18 RX ADMIN — ACETAMINOPHEN, DIPHENHYDRAMINE HCL, PHENYLEPHRINE HCL 5 MILLIGRAM(S): 325; 25; 5 TABLET ORAL at 22:24

## 2024-11-18 RX ADMIN — OXYCODONE HYDROCHLORIDE 5 MILLIGRAM(S): 30 TABLET ORAL at 00:31

## 2024-11-18 RX ADMIN — ACETAMINOPHEN 500MG 650 MILLIGRAM(S): 500 TABLET, COATED ORAL at 06:29
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
## 2024-11-18 NOTE — PHYSICAL THERAPY INITIAL EVALUATION ADULT - ACTIVE RANGE OF MOTION EXAMINATION, REHAB EVAL
Left hip flex WFL, c/o pain with AROM/Left LE Active ROM was WFL (within functional limits)/Right LE Active ROM was WFL (within functional limits)

## 2024-11-18 NOTE — PHYSICAL THERAPY INITIAL EVALUATION ADULT - FOLLOWS COMMANDS/ANSWERS QUESTIONS, REHAB EVAL
[facility] Medication Refill Request Information:  * Please contact your pharmacy regarding ANY request for medication refills.  ** [facility] Prescription Fax = [phone]  * Please allow 3 business days for routine medication refills.  * Please allow 5 business days for controlled substance medication refills.     [facility] Test Result notification information:  *You will be notified with in 7-10 days of your appointment day regarding the results of your test.  If you are on MyChart you will be notified as soon as the provider has reviewed the results and signed off on them.    [facility]: [phone]      100% of the time
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
## 2024-11-18 NOTE — PROGRESS NOTE ADULT - SUBJECTIVE AND OBJECTIVE BOX
SUBJECTIVE / 24H EVENTS: Pt seen & examined at bedside. He reports feeling moderate pain to his L hip and R hand for which medications are helping to give relief. Denies numbness / tingling to upper or lower extremities. Has been tolerating a diet w/o N/V. Does admit to some mild "gas pains." Last BM 3 days ago. Voiding w/o difficulty. Denies fever / chills, CP or SOB.    MEDICATIONS  (STANDING):  acetaminophen     Tablet .. 650 milliGRAM(s) Oral every 6 hours  amLODIPine   Tablet 5 milliGRAM(s) Oral daily  enoxaparin Injectable 30 milliGRAM(s) SubCutaneous every 12 hours  melatonin 5 milliGRAM(s) Oral at bedtime  multivitamin/minerals/iron Oral Solution (CENTRUM) 15 milliLiter(s) Oral daily  polyethylene glycol 3350 17 Gram(s) Oral daily  senna 2 Tablet(s) Oral at bedtime  tamsulosin 0.4 milliGRAM(s) Oral at bedtime    MEDICATIONS  (PRN):  artificial tears (preservative free) Ophthalmic Solution 1 Drop(s) Both EYES three times a day PRN Dry Eyes  bisacodyl Suppository 10 milliGRAM(s) Rectal daily PRN Constipation  HYDROmorphone  Injectable 0.2 milliGRAM(s) IV Push every 4 hours PRN Breakthrough Pain  oxyCODONE    IR 2.5 milliGRAM(s) Oral every 4 hours PRN Moderate Pain (4 - 6)  oxyCODONE    IR 5 milliGRAM(s) Oral every 4 hours PRN Severe Pain (7 - 10)  simethicone 80 milliGRAM(s) Chew three times a day PRN Gas      Vital Signs Last 24 Hrs  T(C): 36.9 (18 Nov 2024 09:22), Max: 36.9 (17 Nov 2024 14:11)  T(F): 98.4 (18 Nov 2024 09:22), Max: 98.5 (17 Nov 2024 14:11)  HR: 93 (18 Nov 2024 09:22) (87 - 99)  BP: 106/69 (18 Nov 2024 09:22) (100/67 - 114/68)  BP(mean): --  RR: 18 (18 Nov 2024 09:22) (16 - 18)  SpO2: 96% (18 Nov 2024 09:22) (92% - 96%)    Parameters below as of 18 Nov 2024 09:22  Patient On (Oxygen Delivery Method): room air        Constitutional: patient appears comfortable resting in bed, in no apparent distress  HEENT: L facial abrasions / periorbital ecchymosis. EOMI b/l  Respiratory: respirations are unlabored, no accessory muscle use, no conversational dyspnea, on room air  Cardiovascular: regular rate & rhythm  Gastrointestinal: abdomen is soft & non-distended, non-tender, no rebound tenderness / guarding  Neurological: GCS15, A&O x 3, no focal deficity  Musculoskeletal: CABA x 4 spontaneously. TTP of L hip. LLE sensation is intact, compartments soft, skin warm w/ brisk cap refill. No foot drop. 2+ DP pulse. RUE splinted, moving distal aspect of fingers, sensation intact, skin warm w/ brisk cap refill.   Skin: mucous membranes moist, no diaphoresis or pallor      I&O's Detail    17 Nov 2024 07:01  -  18 Nov 2024 07:00  --------------------------------------------------------  IN:    Oral Fluid: 250 mL  Total IN: 250 mL    OUT:    Incontinent per Condom Catheter (mL): 1520 mL  Total OUT: 1520 mL    Total NET: -1270 mL          LABS:                        9.3    8.44  )-----------( 129      ( 18 Nov 2024 04:20 )             26.9     11-18    137  |  102  |  17.2  ----------------------------<  101[H]  3.9   |  23.0  |  0.59    Ca    8.1[L]      18 Nov 2024 04:20  Phos  3.5     11-18  Mg     1.9     11-18        Urinalysis Basic - ( 18 Nov 2024 04:20 )    Color: x / Appearance: x / SG: x / pH: x  Gluc: 101 mg/dL / Ketone: x  / Bili: x / Urobili: x   Blood: x / Protein: x / Nitrite: x   Leuk Esterase: x / RBC: x / WBC x   Sq Epi: x / Non Sq Epi: x / Bacteria: x
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
## 2024-11-19 PROCEDURE — 99223 1ST HOSP IP/OBS HIGH 75: CPT

## 2024-11-19 PROCEDURE — 99232 SBSQ HOSP IP/OBS MODERATE 35: CPT

## 2024-11-19 RX ORDER — ALPRAZOLAM 0.5 MG
0.25 TABLET ORAL ONCE
Refills: 0 | Status: DISCONTINUED | OUTPATIENT
Start: 2024-11-19 | End: 2024-11-19

## 2024-11-19 RX ADMIN — OXYCODONE HYDROCHLORIDE 5 MILLIGRAM(S): 30 TABLET ORAL at 09:20

## 2024-11-19 RX ADMIN — ACETAMINOPHEN 500MG 650 MILLIGRAM(S): 500 TABLET, COATED ORAL at 11:39

## 2024-11-19 RX ADMIN — Medication 15 MILLILITER(S): at 11:39

## 2024-11-19 RX ADMIN — ENOXAPARIN SODIUM 30 MILLIGRAM(S): 30 INJECTION SUBCUTANEOUS at 17:34

## 2024-11-19 RX ADMIN — ACETAMINOPHEN 500MG 650 MILLIGRAM(S): 500 TABLET, COATED ORAL at 01:51

## 2024-11-19 RX ADMIN — OXYCODONE HYDROCHLORIDE 5 MILLIGRAM(S): 30 TABLET ORAL at 08:20

## 2024-11-19 RX ADMIN — ACETAMINOPHEN 500MG 650 MILLIGRAM(S): 500 TABLET, COATED ORAL at 06:11

## 2024-11-19 RX ADMIN — POLYETHYLENE GLYCOL 3350 17 GRAM(S): 17 POWDER, FOR SOLUTION ORAL at 11:39

## 2024-11-19 RX ADMIN — Medication 0.25 MILLIGRAM(S): at 01:49

## 2024-11-19 RX ADMIN — AMLODIPINE BESYLATE 5 MILLIGRAM(S): 10 TABLET ORAL at 06:02

## 2024-11-19 RX ADMIN — ACETAMINOPHEN 500MG 650 MILLIGRAM(S): 500 TABLET, COATED ORAL at 02:00

## 2024-11-19 RX ADMIN — ACETAMINOPHEN, DIPHENHYDRAMINE HCL, PHENYLEPHRINE HCL 5 MILLIGRAM(S): 325; 25; 5 TABLET ORAL at 21:58

## 2024-11-19 RX ADMIN — ACETAMINOPHEN 500MG 650 MILLIGRAM(S): 500 TABLET, COATED ORAL at 06:02

## 2024-11-19 RX ADMIN — TAMSULOSIN HYDROCHLORIDE 0.4 MILLIGRAM(S): 0.4 CAPSULE ORAL at 21:57
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
## 2024-11-19 NOTE — CHART NOTE - NSCHARTNOTESELECT_GEN_ALL_CORE
DOWNGRADE NOTE/Event Note
Nutrition Services
SICU Downgrade/Transfer Note
Tertiary Survey
Event Note

## 2024-11-19 NOTE — OCCUPATIONAL THERAPY INITIAL EVALUATION ADULT - LEVEL OF INDEPENDENCE: TOILET, REHAB EVAL
stand pivot transfer to commode/moderate assist (50% patients effort)/maximum assist (25% patients effort)

## 2024-11-19 NOTE — OCCUPATIONAL THERAPY INITIAL EVALUATION ADULT - RANGE OF MOTION EXAMINATION, UPPER EXTREMITY
bilat UEs WFLs, except bilat shoulders to 90 degrees due to cervical spine precautions; Right hand splinted, limited at digits due to same
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
## 2024-11-19 NOTE — PROGRESS NOTE ADULT - SUBJECTIVE AND OBJECTIVE BOX
SUBJECTIVE / 24H EVENTS: Pt seen & examined. Reports he felt anxious overnight - rcvd 1 dose 0.25mg Xanax w/ good relief. States his pain remains mild & well controlled w/ current pain regimen. Denies numbness / tingling of upper or lower extremities. Has been tolerating diet, no N/V. BM 4 days ago. Passing gas. Was OOB and worked with PT yesterday. Denies fever / chills.     MEDICATIONS  (STANDING):  acetaminophen     Tablet .. 650 milliGRAM(s) Oral every 6 hours  amLODIPine   Tablet 5 milliGRAM(s) Oral daily  enoxaparin Injectable 30 milliGRAM(s) SubCutaneous every 12 hours  melatonin 5 milliGRAM(s) Oral at bedtime  multivitamin/minerals/iron Oral Solution (CENTRUM) 15 milliLiter(s) Oral daily  polyethylene glycol 3350 17 Gram(s) Oral daily  senna 2 Tablet(s) Oral at bedtime  tamsulosin 0.4 milliGRAM(s) Oral at bedtime    MEDICATIONS  (PRN):  artificial tears (preservative free) Ophthalmic Solution 1 Drop(s) Both EYES three times a day PRN Dry Eyes  bisacodyl Suppository 10 milliGRAM(s) Rectal daily PRN Constipation  HYDROmorphone  Injectable 0.2 milliGRAM(s) IV Push every 4 hours PRN Breakthrough Pain  oxyCODONE    IR 2.5 milliGRAM(s) Oral every 4 hours PRN Moderate Pain (4 - 6)  oxyCODONE    IR 5 milliGRAM(s) Oral every 4 hours PRN Severe Pain (7 - 10)  simethicone 80 milliGRAM(s) Chew three times a day PRN Gas      Vital Signs Last 24 Hrs  T(C): 36.8 (19 Nov 2024 04:20), Max: 37.6 (18 Nov 2024 16:06)  T(F): 98.2 (19 Nov 2024 04:20), Max: 99.6 (18 Nov 2024 16:06)  HR: 82 (19 Nov 2024 04:20) (82 - 107)  BP: 114/69 (19 Nov 2024 04:20) (104/72 - 119/72)  BP(mean): --  RR: 18 (19 Nov 2024 04:20) (17 - 18)  SpO2: 93% (19 Nov 2024 04:20) (93% - 96%)    Parameters below as of 19 Nov 2024 04:20  Patient On (Oxygen Delivery Method): room air        Constitutional: patient appears comfortable in bed, in no acute distress  HEENT: scattered L facial abrasions, L periorbital ecchymosis. EOMI b/l  Respiratory: respirations appear unlabored, no accessory muscle use, no conversational dyspnea, on room air  Cardiovascular: regular rate & rhythm  Gastrointestinal: abdomen is soft, non-distended, non-tender, no rebound / guarding  Neurological: GCS15, A&O x 3, no focal neurologic deficits  Musculoskeletal: L hip is TTP. LLE compartments soft, sensation intact, skin warm, brisk cap refill. 2+ DP pulse. RUE splinted, able to move distal aspect of fingers w/o difficulty sensation intact, skin warm  Skin: mucous membranes moist, no diaphoresis or pallor      I&O's Detail    18 Nov 2024 07:01  -  19 Nov 2024 07:00  --------------------------------------------------------  IN:    Oral Fluid: 480 mL  Total IN: 480 mL    OUT:    Incontinent per Condom Catheter (mL): 1100 mL    Voided (mL): 1050 mL  Total OUT: 2150 mL    Total NET: -1670 mL          LABS:                        9.3    8.44  )-----------( 129      ( 18 Nov 2024 04:20 )             26.9     11-18    137  |  102  |  17.2  ----------------------------<  101[H]  3.9   |  23.0  |  0.59    Ca    8.1[L]      18 Nov 2024 04:20  Phos  3.5     11-18  Mg     1.9     11-18        Urinalysis Basic - ( 18 Nov 2024 04:20 )    Color: x / Appearance: x / SG: x / pH: x  Gluc: 101 mg/dL / Ketone: x  / Bili: x / Urobili: x   Blood: x / Protein: x / Nitrite: x   Leuk Esterase: x / RBC: x / WBC x   Sq Epi: x / Non Sq Epi: x / Bacteria: x
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
## 2024-11-20 PROCEDURE — 99232 SBSQ HOSP IP/OBS MODERATE 35: CPT

## 2024-11-20 RX ORDER — OXYCODONE HYDROCHLORIDE 30 MG/1
1 TABLET ORAL
Qty: 0 | Refills: 0 | DISCHARGE
Start: 2024-11-20

## 2024-11-20 RX ORDER — POVIDONE, POLYVINYL ALCOHOL 20; 27 G/1000ML; G/1000ML
1 SOLUTION OPHTHALMIC
Qty: 0 | Refills: 0 | DISCHARGE
Start: 2024-11-20

## 2024-11-20 RX ORDER — SENNOSIDES 8.6 MG
2 TABLET ORAL
Qty: 0 | Refills: 0 | DISCHARGE
Start: 2024-11-20

## 2024-11-20 RX ORDER — POLYETHYLENE GLYCOL 3350 17 G/17G
17 POWDER, FOR SOLUTION ORAL
Qty: 0 | Refills: 0 | DISCHARGE
Start: 2024-11-20

## 2024-11-20 RX ORDER — ACETAMINOPHEN 500MG 500 MG/1
2 TABLET, COATED ORAL
Qty: 0 | Refills: 0 | DISCHARGE
Start: 2024-11-20

## 2024-11-20 RX ORDER — TAMSULOSIN HYDROCHLORIDE 0.4 MG/1
1 CAPSULE ORAL
Qty: 0 | Refills: 0 | DISCHARGE
Start: 2024-11-20

## 2024-11-20 RX ORDER — ACETAMINOPHEN, DIPHENHYDRAMINE HCL, PHENYLEPHRINE HCL 325; 25; 5 MG/1; MG/1; MG/1
1 TABLET ORAL
Qty: 0 | Refills: 0 | DISCHARGE
Start: 2024-11-20

## 2024-11-20 RX ORDER — SIMETHICONE 125 MG
1 CAPSULE ORAL
Qty: 0 | Refills: 0 | DISCHARGE
Start: 2024-11-20

## 2024-11-20 RX ADMIN — ACETAMINOPHEN 500MG 650 MILLIGRAM(S): 500 TABLET, COATED ORAL at 18:29

## 2024-11-20 RX ADMIN — OXYCODONE HYDROCHLORIDE 5 MILLIGRAM(S): 30 TABLET ORAL at 13:25

## 2024-11-20 RX ADMIN — OXYCODONE HYDROCHLORIDE 5 MILLIGRAM(S): 30 TABLET ORAL at 14:25

## 2024-11-20 RX ADMIN — OXYCODONE HYDROCHLORIDE 5 MILLIGRAM(S): 30 TABLET ORAL at 07:35

## 2024-11-20 RX ADMIN — POLYETHYLENE GLYCOL 3350 17 GRAM(S): 17 POWDER, FOR SOLUTION ORAL at 13:26

## 2024-11-20 RX ADMIN — ACETAMINOPHEN 500MG 650 MILLIGRAM(S): 500 TABLET, COATED ORAL at 07:36

## 2024-11-20 RX ADMIN — AMLODIPINE BESYLATE 5 MILLIGRAM(S): 10 TABLET ORAL at 07:36

## 2024-11-20 RX ADMIN — ENOXAPARIN SODIUM 30 MILLIGRAM(S): 30 INJECTION SUBCUTANEOUS at 07:35

## 2024-11-20 RX ADMIN — ENOXAPARIN SODIUM 30 MILLIGRAM(S): 30 INJECTION SUBCUTANEOUS at 18:29

## 2024-11-20 RX ADMIN — TAMSULOSIN HYDROCHLORIDE 0.4 MILLIGRAM(S): 0.4 CAPSULE ORAL at 21:43

## 2024-11-20 RX ADMIN — OXYCODONE HYDROCHLORIDE 5 MILLIGRAM(S): 30 TABLET ORAL at 03:07

## 2024-11-20 RX ADMIN — OXYCODONE HYDROCHLORIDE 5 MILLIGRAM(S): 30 TABLET ORAL at 18:29

## 2024-11-20 RX ADMIN — ACETAMINOPHEN, DIPHENHYDRAMINE HCL, PHENYLEPHRINE HCL 5 MILLIGRAM(S): 325; 25; 5 TABLET ORAL at 21:43

## 2024-11-20 RX ADMIN — Medication 10 MILLIGRAM(S): at 13:26

## 2024-11-20 RX ADMIN — OXYCODONE HYDROCHLORIDE 5 MILLIGRAM(S): 30 TABLET ORAL at 02:07

## 2024-11-20 RX ADMIN — OXYCODONE HYDROCHLORIDE 5 MILLIGRAM(S): 30 TABLET ORAL at 08:00

## 2024-11-20 RX ADMIN — ACETAMINOPHEN 500MG 650 MILLIGRAM(S): 500 TABLET, COATED ORAL at 13:25

## 2024-11-20 RX ADMIN — OXYCODONE HYDROCHLORIDE 5 MILLIGRAM(S): 30 TABLET ORAL at 19:30

## 2024-11-20 NOTE — PROGRESS NOTE ADULT - SUBJECTIVE AND OBJECTIVE BOX
SURGERY        INTERVAL EVENTS/SUBJECTIVE:   No acute events overnight. Pain is currently well controlled, no acute complaints.    ______________________________________________  OBJECTIVE:   T(C): 36.9 (11-20-24 @ 05:00), Max: 36.9 (11-19-24 @ 09:00)  HR: 99 (11-20-24 @ 05:00) (79 - 99)  BP: 113/71 (11-20-24 @ 05:00) (113/71 - 135/85)  RR: 18 (11-20-24 @ 05:00) (18 - 18)  SpO2: 96% (11-20-24 @ 05:00) (91% - 96%)  Wt(kg): --  CAPILLARY BLOOD GLUCOSE        I&O's Detail    19 Nov 2024 07:01  -  20 Nov 2024 07:00  --------------------------------------------------------  IN:  Total IN: 0 mL    OUT:    Voided (mL): 300 mL  Total OUT: 300 mL    Total NET: -300 mL          Physical exam:  Gen: resting in bed comfortably in NAD  Chest: no increased WOB, regular inspiratory effort   Abdomen: Soft, nontender, nondistended with no rebound tenderness or guarding.  Musculoskeletal: L hip is TTP. LLE compartments soft, sensation intact, skin warm, brisk cap refill. 2+ DP pulse. RUE splinted, able to move distal aspect of fingers w/o difficulty sensation intact, skin warm  NEURO: awake, alert  ______________________________________________  LABS:  No labs recorded this AM        _____________________________________________
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
## 2024-11-20 NOTE — PROGRESS NOTE ADULT - SUBJECTIVE AND OBJECTIVE BOX
ORTHOPEDIC POST-OP PROGRESS NOTE:    Name: CHADD GARNETT  MR #: [id number]    Procedure: Intramedulary nail of Right tibia, I&D  Surgeon: Dr. Leung  DOS: 11/15      Pt comfortable without complaints, pain controlled. Denies CP, SOB, N/V, numbness/tingling.       Vital Signs Last 24 Hrs  T(C): 36.9 (11-20-24 @ 05:00), Max: 36.9 (11-20-24 @ 05:00)  T(F): 98.5 (11-20-24 @ 05:00), Max: 98.5 (11-20-24 @ 05:00)  HR: 99 (11-20-24 @ 05:00) (87 - 99)  BP: 113/71 (11-20-24 @ 05:00) (113/71 - 116/69)  BP(mean): 85 (11-20-24 @ 02:00) (85 - 85)  RR: 18 (11-20-24 @ 05:00) (18 - 18)  SpO2: 96% (11-20-24 @ 05:00) (96% - 96%)      General Exam:    General: Pt Alert and oriented, NAD, controlled pain.    Dressings C/D/I. No bleeding. No erythema.    Pulses: 2+ dorsalis pedis pulse. Cap refill < 2 sec.    Sensation: Grossly intact to light touch without deficit.    Motor: +EHL/FHL/AT/GC        A/P: 69y Male  POD# 5 s/p I&D, IMN right tibia    - Stable  - Pain Control  - DVT ppx as prescribed  - PT eval  - Weight bearing status: WBAT
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
## 2024-11-21 LAB
HCT VFR BLD CALC: 28.4 % — LOW (ref 39–50)
HGB BLD-MCNC: 9.8 G/DL — LOW (ref 13–17)
MCHC RBC-ENTMCNC: 31.2 PG — SIGNIFICANT CHANGE UP (ref 27–34)
MCHC RBC-ENTMCNC: 34.5 G/DL — SIGNIFICANT CHANGE UP (ref 32–36)
MCV RBC AUTO: 90.4 FL — SIGNIFICANT CHANGE UP (ref 80–100)
NRBC # BLD: 1 /100 WBCS — HIGH (ref 0–0)
PLATELET # BLD AUTO: 145 K/UL — LOW (ref 150–400)
RBC # BLD: 3.14 M/UL — LOW (ref 4.2–5.8)
RBC # FLD: 13.3 % — SIGNIFICANT CHANGE UP (ref 10.3–14.5)
WBC # BLD: 12.09 K/UL — HIGH (ref 3.8–10.5)
WBC # FLD AUTO: 12.09 K/UL — HIGH (ref 3.8–10.5)

## 2024-11-21 PROCEDURE — 99232 SBSQ HOSP IP/OBS MODERATE 35: CPT

## 2024-11-21 RX ORDER — LIDOCAINE 40 MG/G
1 CREAM TOPICAL DAILY
Refills: 0 | Status: DISCONTINUED | OUTPATIENT
Start: 2024-11-21 | End: 2024-11-22

## 2024-11-21 RX ADMIN — ACETAMINOPHEN 500MG 650 MILLIGRAM(S): 500 TABLET, COATED ORAL at 00:35

## 2024-11-21 RX ADMIN — AMLODIPINE BESYLATE 5 MILLIGRAM(S): 10 TABLET ORAL at 05:17

## 2024-11-21 RX ADMIN — ACETAMINOPHEN, DIPHENHYDRAMINE HCL, PHENYLEPHRINE HCL 5 MILLIGRAM(S): 325; 25; 5 TABLET ORAL at 22:08

## 2024-11-21 RX ADMIN — Medication 15 MILLILITER(S): at 13:37

## 2024-11-21 RX ADMIN — ACETAMINOPHEN 500MG 650 MILLIGRAM(S): 500 TABLET, COATED ORAL at 13:37

## 2024-11-21 RX ADMIN — OXYCODONE HYDROCHLORIDE 5 MILLIGRAM(S): 30 TABLET ORAL at 09:31

## 2024-11-21 RX ADMIN — ACETAMINOPHEN 500MG 650 MILLIGRAM(S): 500 TABLET, COATED ORAL at 01:05

## 2024-11-21 RX ADMIN — ENOXAPARIN SODIUM 30 MILLIGRAM(S): 30 INJECTION SUBCUTANEOUS at 18:29

## 2024-11-21 RX ADMIN — LIDOCAINE 1 PATCH: 40 CREAM TOPICAL at 13:37

## 2024-11-21 RX ADMIN — LIDOCAINE 1 PATCH: 40 CREAM TOPICAL at 19:30

## 2024-11-21 RX ADMIN — OXYCODONE HYDROCHLORIDE 5 MILLIGRAM(S): 30 TABLET ORAL at 23:00

## 2024-11-21 RX ADMIN — OXYCODONE HYDROCHLORIDE 5 MILLIGRAM(S): 30 TABLET ORAL at 22:08

## 2024-11-21 RX ADMIN — OXYCODONE HYDROCHLORIDE 5 MILLIGRAM(S): 30 TABLET ORAL at 10:30
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
## 2024-11-21 NOTE — PROGRESS NOTE ADULT - SUBJECTIVE AND OBJECTIVE BOX
INTERVAL HPI/OVERNIGHT EVENTS:    Patient evaluated at bedside. No acute distress. No acute events overnight. No complaints at this time.     MEDICATIONS  (STANDING):  acetaminophen     Tablet .. 650 milliGRAM(s) Oral every 6 hours  amLODIPine   Tablet 5 milliGRAM(s) Oral daily  enoxaparin Injectable 30 milliGRAM(s) SubCutaneous every 12 hours  lidocaine   4% Patch 1 Patch Transdermal daily  melatonin 5 milliGRAM(s) Oral at bedtime  multivitamin/minerals/iron Oral Solution (CENTRUM) 15 milliLiter(s) Oral daily  tamsulosin 0.4 milliGRAM(s) Oral at bedtime    MEDICATIONS  (PRN):  artificial tears (preservative free) Ophthalmic Solution 1 Drop(s) Both EYES three times a day PRN Dry Eyes  bisacodyl Suppository 10 milliGRAM(s) Rectal daily PRN Constipation  oxyCODONE    IR 2.5 milliGRAM(s) Oral every 4 hours PRN Moderate Pain (4 - 6)  oxyCODONE    IR 5 milliGRAM(s) Oral every 4 hours PRN Severe Pain (7 - 10)  simethicone 80 milliGRAM(s) Chew three times a day PRN Gas    Vital Signs Last 24 Hrs  T(C): 36.9 (21 Nov 2024 10:35), Max: 37.3 (21 Nov 2024 00:10)  T(F): 98.5 (21 Nov 2024 10:35), Max: 99.2 (21 Nov 2024 00:10)  HR: 97 (21 Nov 2024 10:35) (97 - 107)  BP: 107/67 (21 Nov 2024 10:35) (107/67 - 117/71)  BP(mean): --  RR: 18 (21 Nov 2024 10:35) (17 - 18)  SpO2: 93% (21 Nov 2024 10:35) (92% - 94%)    Parameters below as of 21 Nov 2024 10:35  Patient On (Oxygen Delivery Method): nasal cannula, 2    Physical exam:  Gen: seated in chair comfortably in NAD, family present   Chest: no increased WOB, regular inspiratory effort   Abdomen: Soft, nontender, nondistended with no rebound tenderness or guarding.  Musculoskeletal: L hip is TTP. LLE compartments soft, sensation intact, skin warm, brisk cap refill. 2+ DP pulse. RUE splinted, able to move distal aspect of fingers w/o difficulty sensation intact, skin warm  NEURO: awake, alert    I&O's Detail    20 Nov 2024 07:01  -  21 Nov 2024 07:00  --------------------------------------------------------  IN:    Oral Fluid: 200 mL  Total IN: 200 mL    OUT:    Voided (mL): 2300 mL  Total OUT: 2300 mL    Total NET: -2100 mL    LABS:                        9.8    12.09 )-----------( 145      ( 21 Nov 2024 05:30 )             28.4     RADIOLOGY & ADDITIONAL STUDIES: INTERVAL HPI/OVERNIGHT EVENTS:    Patient evaluated at bedside. No acute distress. No acute events overnight. Complaining of mild LLQ nonradiating pain, exacerbating w/ BMs, not present at baseline. Associated w/ nonbloody diarrhea. No other complaints at this time. Denies nausea, vomiting, constipation.     MEDICATIONS  (STANDING):  acetaminophen     Tablet .. 650 milliGRAM(s) Oral every 6 hours  amLODIPine   Tablet 5 milliGRAM(s) Oral daily  enoxaparin Injectable 30 milliGRAM(s) SubCutaneous every 12 hours  lidocaine   4% Patch 1 Patch Transdermal daily  melatonin 5 milliGRAM(s) Oral at bedtime  multivitamin/minerals/iron Oral Solution (CENTRUM) 15 milliLiter(s) Oral daily  tamsulosin 0.4 milliGRAM(s) Oral at bedtime    MEDICATIONS  (PRN):  artificial tears (preservative free) Ophthalmic Solution 1 Drop(s) Both EYES three times a day PRN Dry Eyes  bisacodyl Suppository 10 milliGRAM(s) Rectal daily PRN Constipation  oxyCODONE    IR 2.5 milliGRAM(s) Oral every 4 hours PRN Moderate Pain (4 - 6)  oxyCODONE    IR 5 milliGRAM(s) Oral every 4 hours PRN Severe Pain (7 - 10)  simethicone 80 milliGRAM(s) Chew three times a day PRN Gas    Vital Signs Last 24 Hrs  T(C): 36.9 (21 Nov 2024 10:35), Max: 37.3 (21 Nov 2024 00:10)  T(F): 98.5 (21 Nov 2024 10:35), Max: 99.2 (21 Nov 2024 00:10)  HR: 97 (21 Nov 2024 10:35) (97 - 107)  BP: 107/67 (21 Nov 2024 10:35) (107/67 - 117/71)  BP(mean): --  RR: 18 (21 Nov 2024 10:35) (17 - 18)  SpO2: 93% (21 Nov 2024 10:35) (92% - 94%)    Parameters below as of 21 Nov 2024 10:35  Patient On (Oxygen Delivery Method): nasal cannula, 2    Physical exam:  Gen: seated in chair comfortably in NAD, family present   Chest: no increased WOB, regular inspiratory effort   Abdomen: Soft, minimal tenderness on deep palpation in LLQ, nondistended with no rebound tenderness or guarding.  Musculoskeletal: L hip is TTP. LLE compartments soft, sensation intact, skin warm, brisk cap refill. 2+ DP pulse. RUE splinted, able to move distal aspect of fingers w/o difficulty sensation intact, skin warm  NEURO: awake, alert    I&O's Detail    20 Nov 2024 07:01  -  21 Nov 2024 07:00  --------------------------------------------------------  IN:    Oral Fluid: 200 mL  Total IN: 200 mL    OUT:    Voided (mL): 2300 mL  Total OUT: 2300 mL    Total NET: -2100 mL    LABS:                        9.8    12.09 )-----------( 145      ( 21 Nov 2024 05:30 )             28.4     RADIOLOGY & ADDITIONAL STUDIES:
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
## 2024-11-22 ENCOUNTER — INPATIENT (INPATIENT)
Facility: HOSPITAL | Age: 69
LOS: 3 days | Discharge: OTHER INSTITUTION | DRG: 536 | End: 2024-11-26
Attending: PHYSICAL MEDICINE & REHABILITATION | Admitting: PHYSICAL MEDICINE & REHABILITATION
Payer: MEDICAID

## 2024-11-22 VITALS
OXYGEN SATURATION: 92 % | RESPIRATION RATE: 20 BRPM | TEMPERATURE: 98 F | SYSTOLIC BLOOD PRESSURE: 119 MMHG | DIASTOLIC BLOOD PRESSURE: 76 MMHG | HEART RATE: 106 BPM

## 2024-11-22 VITALS
HEIGHT: 67 IN | TEMPERATURE: 98 F | HEART RATE: 109 BPM | WEIGHT: 159.84 LBS | RESPIRATION RATE: 16 BRPM | SYSTOLIC BLOOD PRESSURE: 111 MMHG | DIASTOLIC BLOOD PRESSURE: 74 MMHG | OXYGEN SATURATION: 94 %

## 2024-11-22 DIAGNOSIS — S32.9XXA FRACTURE OF UNSPECIFIED PARTS OF LUMBOSACRAL SPINE AND PELVIS, INITIAL ENCOUNTER FOR CLOSED FRACTURE: ICD-10-CM

## 2024-11-22 LAB — SARS-COV-2 RNA SPEC QL NAA+PROBE: SIGNIFICANT CHANGE UP

## 2024-11-22 PROCEDURE — 72170 X-RAY EXAM OF PELVIS: CPT

## 2024-11-22 PROCEDURE — P9012: CPT

## 2024-11-22 PROCEDURE — 86901 BLOOD TYPING SEROLOGIC RH(D): CPT

## 2024-11-22 PROCEDURE — 99232 SBSQ HOSP IP/OBS MODERATE 35: CPT

## 2024-11-22 PROCEDURE — 72141 MRI NECK SPINE W/O DYE: CPT | Mod: MC

## 2024-11-22 PROCEDURE — 71260 CT THORAX DX C+: CPT | Mod: MC

## 2024-11-22 PROCEDURE — 86900 BLOOD TYPING SEROLOGIC ABO: CPT

## 2024-11-22 PROCEDURE — 86965 POOLING BLOOD PLATELETS: CPT

## 2024-11-22 PROCEDURE — 85027 COMPLETE CBC AUTOMATED: CPT

## 2024-11-22 PROCEDURE — G0480: CPT

## 2024-11-22 PROCEDURE — 74177 CT ABD & PELVIS W/CONTRAST: CPT | Mod: MC

## 2024-11-22 PROCEDURE — 74174 CTA ABD&PLVS W/CONTRAST: CPT | Mod: MC

## 2024-11-22 PROCEDURE — 72125 CT NECK SPINE W/O DYE: CPT | Mod: MC

## 2024-11-22 PROCEDURE — 85384 FIBRINOGEN ACTIVITY: CPT

## 2024-11-22 PROCEDURE — 86850 RBC ANTIBODY SCREEN: CPT

## 2024-11-22 PROCEDURE — 82550 ASSAY OF CK (CPK): CPT

## 2024-11-22 PROCEDURE — 99233 SBSQ HOSP IP/OBS HIGH 50: CPT

## 2024-11-22 PROCEDURE — 73560 X-RAY EXAM OF KNEE 1 OR 2: CPT

## 2024-11-22 PROCEDURE — 83550 IRON BINDING TEST: CPT

## 2024-11-22 PROCEDURE — 36415 COLL VENOUS BLD VENIPUNCTURE: CPT

## 2024-11-22 PROCEDURE — 99223 1ST HOSP IP/OBS HIGH 75: CPT

## 2024-11-22 PROCEDURE — 82607 VITAMIN B-12: CPT

## 2024-11-22 PROCEDURE — 73562 X-RAY EXAM OF KNEE 3: CPT

## 2024-11-22 PROCEDURE — 96374 THER/PROPH/DIAG INJ IV PUSH: CPT

## 2024-11-22 PROCEDURE — 85610 PROTHROMBIN TIME: CPT

## 2024-11-22 PROCEDURE — 93005 ELECTROCARDIOGRAM TRACING: CPT

## 2024-11-22 PROCEDURE — 82728 ASSAY OF FERRITIN: CPT

## 2024-11-22 PROCEDURE — 71045 X-RAY EXAM CHEST 1 VIEW: CPT

## 2024-11-22 PROCEDURE — 96375 TX/PRO/DX INJ NEW DRUG ADDON: CPT

## 2024-11-22 PROCEDURE — 36430 TRANSFUSION BLD/BLD COMPNT: CPT

## 2024-11-22 PROCEDURE — 85730 THROMBOPLASTIN TIME PARTIAL: CPT

## 2024-11-22 PROCEDURE — 80048 BASIC METABOLIC PNL TOTAL CA: CPT

## 2024-11-22 PROCEDURE — T1013: CPT

## 2024-11-22 PROCEDURE — 80053 COMPREHEN METABOLIC PANEL: CPT

## 2024-11-22 PROCEDURE — 73610 X-RAY EXAM OF ANKLE: CPT

## 2024-11-22 PROCEDURE — 84484 ASSAY OF TROPONIN QUANT: CPT

## 2024-11-22 PROCEDURE — 70496 CT ANGIOGRAPHY HEAD: CPT | Mod: MC

## 2024-11-22 PROCEDURE — 83605 ASSAY OF LACTIC ACID: CPT

## 2024-11-22 PROCEDURE — 83540 ASSAY OF IRON: CPT

## 2024-11-22 PROCEDURE — 73130 X-RAY EXAM OF HAND: CPT

## 2024-11-22 PROCEDURE — 70498 CT ANGIOGRAPHY NECK: CPT | Mod: MC

## 2024-11-22 PROCEDURE — 80307 DRUG TEST PRSMV CHEM ANLYZR: CPT

## 2024-11-22 PROCEDURE — 84443 ASSAY THYROID STIM HORMONE: CPT

## 2024-11-22 PROCEDURE — 87640 STAPH A DNA AMP PROBE: CPT

## 2024-11-22 PROCEDURE — 70486 CT MAXILLOFACIAL W/O DYE: CPT | Mod: MC

## 2024-11-22 PROCEDURE — 87641 MR-STAPH DNA AMP PROBE: CPT

## 2024-11-22 PROCEDURE — 85025 COMPLETE CBC W/AUTO DIFF WBC: CPT

## 2024-11-22 PROCEDURE — 82553 CREATINE MB FRACTION: CPT

## 2024-11-22 PROCEDURE — 73700 CT LOWER EXTREMITY W/O DYE: CPT | Mod: MC

## 2024-11-22 PROCEDURE — 84100 ASSAY OF PHOSPHORUS: CPT

## 2024-11-22 PROCEDURE — 83735 ASSAY OF MAGNESIUM: CPT

## 2024-11-22 PROCEDURE — 70553 MRI BRAIN STEM W/O & W/DYE: CPT | Mod: MC

## 2024-11-22 PROCEDURE — 99285 EMERGENCY DEPT VISIT HI MDM: CPT

## 2024-11-22 PROCEDURE — 84466 ASSAY OF TRANSFERRIN: CPT

## 2024-11-22 PROCEDURE — 70450 CT HEAD/BRAIN W/O DYE: CPT | Mod: MC

## 2024-11-22 PROCEDURE — 72146 MRI CHEST SPINE W/O DYE: CPT | Mod: MC

## 2024-11-22 RX ORDER — TAMSULOSIN HYDROCHLORIDE 0.4 MG/1
0.4 CAPSULE ORAL AT BEDTIME
Refills: 0 | Status: DISCONTINUED | OUTPATIENT
Start: 2024-11-22 | End: 2024-11-26

## 2024-11-22 RX ORDER — OXYCODONE HYDROCHLORIDE 30 MG/1
5 TABLET ORAL EVERY 4 HOURS
Refills: 0 | Status: DISCONTINUED | OUTPATIENT
Start: 2024-11-22 | End: 2024-11-26

## 2024-11-22 RX ORDER — AMLODIPINE BESYLATE 10 MG/1
5 TABLET ORAL DAILY
Refills: 0 | Status: DISCONTINUED | OUTPATIENT
Start: 2024-11-23 | End: 2024-11-26

## 2024-11-22 RX ORDER — COLLAGENASE SANTYL 250 [ARB'U]/G
1 OINTMENT TOPICAL DAILY
Refills: 0 | Status: DISCONTINUED | OUTPATIENT
Start: 2024-11-22 | End: 2024-11-26

## 2024-11-22 RX ORDER — SIMETHICONE 125 MG
80 CAPSULE ORAL THREE TIMES A DAY
Refills: 0 | Status: DISCONTINUED | OUTPATIENT
Start: 2024-11-22 | End: 2024-11-26

## 2024-11-22 RX ORDER — ACETAMINOPHEN 500MG 500 MG/1
650 TABLET, COATED ORAL EVERY 6 HOURS
Refills: 0 | Status: DISCONTINUED | OUTPATIENT
Start: 2024-11-22 | End: 2024-11-26

## 2024-11-22 RX ORDER — OXYCODONE HYDROCHLORIDE 30 MG/1
2.5 TABLET ORAL EVERY 4 HOURS
Refills: 0 | Status: DISCONTINUED | OUTPATIENT
Start: 2024-11-22 | End: 2024-11-26

## 2024-11-22 RX ORDER — ENOXAPARIN SODIUM 30 MG/.3ML
30 INJECTION SUBCUTANEOUS EVERY 12 HOURS
Refills: 0 | Status: DISCONTINUED | OUTPATIENT
Start: 2024-11-23 | End: 2024-11-25

## 2024-11-22 RX ORDER — SENNOSIDES 8.6 MG
2 TABLET ORAL AT BEDTIME
Refills: 0 | Status: DISCONTINUED | OUTPATIENT
Start: 2024-11-22 | End: 2024-11-26

## 2024-11-22 RX ORDER — POVIDONE, POLYVINYL ALCOHOL 20; 27 G/1000ML; G/1000ML
1 SOLUTION OPHTHALMIC THREE TIMES A DAY
Refills: 0 | Status: DISCONTINUED | OUTPATIENT
Start: 2024-11-22 | End: 2024-11-26

## 2024-11-22 RX ORDER — ACETAMINOPHEN, DIPHENHYDRAMINE HCL, PHENYLEPHRINE HCL 325; 25; 5 MG/1; MG/1; MG/1
6 TABLET ORAL AT BEDTIME
Refills: 0 | Status: DISCONTINUED | OUTPATIENT
Start: 2024-11-22 | End: 2024-11-26

## 2024-11-22 RX ORDER — COLLAGENASE SANTYL 250 [ARB'U]/G
1 OINTMENT TOPICAL
Qty: 0 | Refills: 0 | DISCHARGE
Start: 2024-11-22

## 2024-11-22 RX ORDER — LIDOCAINE 40 MG/G
1 CREAM TOPICAL
Qty: 0 | Refills: 0 | DISCHARGE
Start: 2024-11-22

## 2024-11-22 RX ORDER — POLYETHYLENE GLYCOL 3350 17 G/17G
17 POWDER, FOR SOLUTION ORAL DAILY
Refills: 0 | Status: DISCONTINUED | OUTPATIENT
Start: 2024-11-23 | End: 2024-11-26

## 2024-11-22 RX ORDER — COLLAGENASE SANTYL 250 [ARB'U]/G
1 OINTMENT TOPICAL DAILY
Refills: 0 | Status: DISCONTINUED | OUTPATIENT
Start: 2024-11-22 | End: 2024-11-22

## 2024-11-22 RX ORDER — LIDOCAINE 40 MG/G
1 CREAM TOPICAL DAILY
Refills: 0 | Status: DISCONTINUED | OUTPATIENT
Start: 2024-11-23 | End: 2024-11-26

## 2024-11-22 RX ADMIN — ENOXAPARIN SODIUM 30 MILLIGRAM(S): 30 INJECTION SUBCUTANEOUS at 17:34

## 2024-11-22 RX ADMIN — LIDOCAINE 1 PATCH: 40 CREAM TOPICAL at 01:00

## 2024-11-22 RX ADMIN — AMLODIPINE BESYLATE 5 MILLIGRAM(S): 10 TABLET ORAL at 05:35

## 2024-11-22 RX ADMIN — Medication 2 TABLET(S): at 22:48

## 2024-11-22 RX ADMIN — ACETAMINOPHEN, DIPHENHYDRAMINE HCL, PHENYLEPHRINE HCL 6 MILLIGRAM(S): 325; 25; 5 TABLET ORAL at 22:48

## 2024-11-22 RX ADMIN — LIDOCAINE 1 PATCH: 40 CREAM TOPICAL at 18:04

## 2024-11-22 RX ADMIN — ACETAMINOPHEN 500MG 650 MILLIGRAM(S): 500 TABLET, COATED ORAL at 12:34

## 2024-11-22 RX ADMIN — OXYCODONE HYDROCHLORIDE 5 MILLIGRAM(S): 30 TABLET ORAL at 10:25

## 2024-11-22 RX ADMIN — ACETAMINOPHEN 500MG 650 MILLIGRAM(S): 500 TABLET, COATED ORAL at 13:30

## 2024-11-22 RX ADMIN — LIDOCAINE 1 PATCH: 40 CREAM TOPICAL at 12:34

## 2024-11-22 RX ADMIN — OXYCODONE HYDROCHLORIDE 5 MILLIGRAM(S): 30 TABLET ORAL at 18:25

## 2024-11-22 RX ADMIN — OXYCODONE HYDROCHLORIDE 5 MILLIGRAM(S): 30 TABLET ORAL at 17:34

## 2024-11-22 RX ADMIN — Medication 15 MILLILITER(S): at 12:34

## 2024-11-22 RX ADMIN — TAMSULOSIN HYDROCHLORIDE 0.4 MILLIGRAM(S): 0.4 CAPSULE ORAL at 22:48

## 2024-11-22 RX ADMIN — OXYCODONE HYDROCHLORIDE 5 MILLIGRAM(S): 30 TABLET ORAL at 11:25

## 2024-11-22 RX ADMIN — ACETAMINOPHEN 500MG 650 MILLIGRAM(S): 500 TABLET, COATED ORAL at 22:48

## 2024-11-22 RX ADMIN — ENOXAPARIN SODIUM 30 MILLIGRAM(S): 30 INJECTION SUBCUTANEOUS at 05:35
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
## 2024-11-22 NOTE — H&P ADULT - NSHPLABSRESULTS_GEN_ALL_CORE
RECENT LABS/IMAGING                          9.8    12.09 )-----------( 145      ( 21 Nov 2024 05:30 )                28.4     MR Thoracic Spine No Cont (11.16.24 @ 11:11)     IMPRESSION:    Cervical spine: No acute fracture.  Cervical degenerative changes.  Thoracic spine: Acute fractures of the T2-T4 superior endplates, without   bony retropulsion.    MR Head w/wo IV Cont (11.16.24 @ 11:10)     IMPRESSION: No acute infarct, hemorrhage, or mass effect.    CT Angio Abdomen and Pelvis w/ IV Cont (11.15.24 @ 14:13)     IMPRESSION:  Complex left pelvic fractures again seen with associated hemorrhage in   the anterior space of Retzius as well as intramuscular hematomas   involving the left obturator externus and internus muscles as seen on   study of earlier in the day without definite arterial extravasation of   contrast to suggest active bleeding.     X-ray Ankle Complete 3 Views, Right (11.15.24 @ 10:24)     IMPRESSION: Left inner acetabular fracture with pelvic hematoma.    Nondisplaced fracture of the proximal phalanx of the third right finger.       CT Angio Neck w/ IV Cont (11.15.24 @ 09:26)     IMPRESSION:    CTA BRAIN:  0.4 cm aneurysmal change at the right M2 bifurcation.    CTA NECK:  No evidence of critical stenosis by NASCET criteria.
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
## 2024-11-22 NOTE — H&P ADULT - NSHPPHYSICALEXAM_GEN_ALL_CORE
PHYSICAL EXAM  VITALS  T(C): 36.8 (11-22-24 @ 17:35), Max: 37.2 (11-22-24 @ 00:15)  HR: 106 (11-22-24 @ 17:35) (90 - 110)  BP: 119/76 (11-22-24 @ 17:35) (104/61 - 119/76)  RR: 20 (11-22-24 @ 17:35) (18 - 20)  SpO2: 92% (11-22-24 @ 17:35) (92% - 93%)    Gen - NAD, Comfortable  HEENT - Large left frontal scalp hematoma with surrounding ecchymosis extending to both ears  Neck - Supple, No limited ROM  Pulm - CTAB, No wheeze, No rhonchi, No crackles  Cardiovascular - RRR, S1S2  Chest - good chest expansion, good respiratory effort  Abdomen - Soft, NT/ND, +BS  Extremities - No Cyanosis, no clubbing, no edema, no calf tenderness  Neuro-     Cognitive - awake, alert, oriented to person, place, date, year, and situation.  Able to follow command     Communication - Fluent, Comprehensible     Attention: Intact     Memory: memory intact     Cranial Nerves - CN 2-12 intact     Motor -                     LEFT    UE -  5/5                    RIGHT UE - 5/5                    LEFT    LE - HF & KE - not tested, DF 5/5, PF 5/5                    RIGHT LE - HF 5/5, KE 5/5, DF 5/5, PF 5/5        Sensory - Intact  to LT      Reflexes - DTR Intact, No primitive reflexive     Coordination - FTN intact      Tone - normal  Psychiatric - Mood stable, Affect WNL  Skin: Right lower leg wound (6.0 x 3.0), multiple skin tears to penile shaft, skin tear to scrotum, scab to b/l knees and left great toe, ecchymosis to  left pelvis/hip.

## 2024-11-22 NOTE — PROGRESS NOTE ADULT - PROVIDER SPECIALTY LIST ADULT
Neurosurgery
Orthopedics
Orthopedics
Physiatry
Hospitalist
Internal Medicine
Orthopedics
Physiatry
Physiatry
SICU
Surgery
Trauma Surgery
Hospitalist
Neurosurgery
SICU
Trauma Surgery
SICU

## 2024-11-22 NOTE — H&P ADULT - NSHPSOCIALHISTORY_GEN_ALL_CORE
Smoking -  EtOH -   Drugs -     Marital status:    Patient lives   PTA: Independent in ADLs and ambulation     CURRENT FUNCTIONAL STATUS  Date:   Bed Mobility:   Transfers:   Gait:   Upper Body Dressing:  Lower Body Dressing:  Toileting:  Bathing: Smoking -  EtOH -   Drugs -     Patient lives with son in private home, 2 REJI with rails, no steps inside  PTA: Independent in ADLs and ambulation     CURRENT FUNCTIONAL STATUS  Date: 11/22/24  Bed Mobility: Mod A, 1 person  Transfers: Mod A, 2 person, NWB RUE, TTWB LLE, + C collar  with thoracic extension  Gait: Mod A, 2 person, NWB RUE, TTWB LLE, 1 side step with RW  Upper Body Dressing: Max A  Lower Body Dressing: Max A,   C collar  with thoracic extension Smoking - Denies  EtOH -  Denies  Drugs -  Denies    Patient lives with son in private home, 2 REJI with rails, no steps inside  PTA: Independent in ADLs and ambulation     CURRENT FUNCTIONAL STATUS  Date: 11/22/24  Bed Mobility: Mod A, 1 person  Transfers: Mod A, 2 person, NWB RUE, TTWB LLE, + C collar  with thoracic extension  Gait: Mod A, 2 person, NWB RUE, TTWB LLE, 1 side step with RW  Upper Body Dressing: Max A  Lower Body Dressing: Max A,   C collar  with thoracic extension
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
## 2024-11-22 NOTE — H&P ADULT - NSHPREVIEWOFSYSTEMS_GEN_ALL_CORE
REVIEW OF SYSTEMS  Constitutional: No fever, No Chills, No fatigue  HEENT: No eye pain, No visual disturbances, No difficulty hearing  Pulm: No cough,  No shortness of breath  Cardio: No chest pain, No palpitations  GI:  No abdominal pain, No nausea, No vomiting, No diarrhea, No constipation  : No dysuria, No frequency, No hematuria  Neuro: + headaches, No memory loss, + loss of strength, no  numbness, No tremors  Skin: No itching, No rashes, No lesions   MSK: + joint pain, + joint swelling, No muscle pain, No Neck pain,  No back pain  Psych:  No depression, No anxiety Constitutional: No fever, No Chills, No fatigue  HEENT: No eye pain, No visual disturbances, No difficulty hearing  Pulm: No cough,  No shortness of breath  Cardio: No chest pain, No palpitations  GI:  No abdominal pain, No nausea, No vomiting, No diarrhea, No constipation  : No dysuria, No frequency, No hematuria  Neuro: + headaches, No memory loss, + loss of strength, no  numbness, No tremors  Skin: No itching, No rashes, No lesions   MSK: + joint pain, + joint swelling, No muscle pain, No Neck pain,  No back pain  Psych:  No depression, No anxiety
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
## 2024-11-22 NOTE — DISCHARGE NOTE NURSING/CASE MANAGEMENT/SOCIAL WORK - FINANCIAL ASSISTANCE
[facility] provides services at a reduced cost to those who are determined to be eligible through [facility]’s financial assistance program. Information regarding [facility]’s financial assistance program can be found by going to [email or web address] or by calling [phone].
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
## 2024-11-22 NOTE — PATIENT PROFILE ADULT - FALL HARM RISK - HARM RISK INTERVENTIONS

## 2024-11-22 NOTE — PROGRESS NOTE ADULT - SUBJECTIVE AND OBJECTIVE BOX
INTERVAL HPI/OVERNIGHT EVENTS:    Patient evaluated at bedside. No acute distress. No acute events overnight.    MEDICATIONS  (STANDING):  acetaminophen     Tablet .. 650 milliGRAM(s) Oral every 6 hours  amLODIPine   Tablet 5 milliGRAM(s) Oral daily  enoxaparin Injectable 30 milliGRAM(s) SubCutaneous every 12 hours  lidocaine   4% Patch 1 Patch Transdermal daily  melatonin 5 milliGRAM(s) Oral at bedtime  multivitamin/minerals/iron Oral Solution (CENTRUM) 15 milliLiter(s) Oral daily  tamsulosin 0.4 milliGRAM(s) Oral at bedtime    MEDICATIONS  (PRN):  artificial tears (preservative free) Ophthalmic Solution 1 Drop(s) Both EYES three times a day PRN Dry Eyes  bisacodyl Suppository 10 milliGRAM(s) Rectal daily PRN Constipation  oxyCODONE    IR 2.5 milliGRAM(s) Oral every 4 hours PRN Moderate Pain (4 - 6)  oxyCODONE    IR 5 milliGRAM(s) Oral every 4 hours PRN Severe Pain (7 - 10)  simethicone 80 milliGRAM(s) Chew three times a day PRN Gas      Vital Signs Last 24 Hrs  T(C): 36.8 (22 Nov 2024 08:45), Max: 37.2 (22 Nov 2024 00:15)  T(F): 98.3 (22 Nov 2024 08:45), Max: 99 (22 Nov 2024 05:00)  HR: 110 (22 Nov 2024 08:45) (90 - 110)  BP: 118/75 (22 Nov 2024 08:45) (104/61 - 118/75)  BP(mean): --  RR: 20 (22 Nov 2024 08:45) (18 - 20)  SpO2: 93% (22 Nov 2024 08:45) (91% - 93%)    Parameters below as of 22 Nov 2024 08:45  Patient On (Oxygen Delivery Method): room air        PHYSICAL EXAM:  Physical exam:  Gen: seated in chair comfortably in NAD, family present   Chest: no increased WOB, regular inspiratory effort   Abdomen: Soft, nondistended with no rebound tenderness or guarding.  Musculoskeletal: L hip is TTP. LLE compartments soft, sensation intact, skin warm, brisk cap refill. 2+ DP pulse. RUE splinted, able to move distal aspect of fingers w/o difficulty, sensation intact, skin warm  NEURO: awake, alert    I&O's Detail    21 Nov 2024 07:01  -  22 Nov 2024 07:00  --------------------------------------------------------  IN:    Oral Fluid: 480 mL  Total IN: 480 mL    OUT:    Voided (mL): 1200 mL  Total OUT: 1200 mL    Total NET: -720 mL          LABS:                        9.8    12.09 )-----------( 145      ( 21 Nov 2024 05:30 )             28.4                 RADIOLOGY & ADDITIONAL STUDIES: Protopic Pregnancy And Lactation Text: This medication is Pregnancy Category C. It is unknown if this medication is excreted in breast milk when applied topically.

## 2024-11-22 NOTE — DISCHARGE NOTE NURSING/CASE MANAGEMENT/SOCIAL WORK - PATIENT PORTAL LINK FT
You can access the FollowMyHealth Patient Portal offered by [facility] by registering at the following website: [email or web address]. By joining [employer]’s FollowMyHealth portal, you will also be able to view your health information using other applications (apps) compatible with our system.
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
## 2024-11-23 LAB
ALBUMIN SERPL ELPH-MCNC: 3.1 G/DL — LOW (ref 3.3–5)
ALP SERPL-CCNC: 94 U/L — SIGNIFICANT CHANGE UP (ref 40–120)
ALT FLD-CCNC: 38 U/L — SIGNIFICANT CHANGE UP (ref 10–45)
ANION GAP SERPL CALC-SCNC: 10 MMOL/L — SIGNIFICANT CHANGE UP (ref 5–17)
ANISOCYTOSIS BLD QL: SLIGHT — SIGNIFICANT CHANGE UP
AST SERPL-CCNC: 33 U/L — SIGNIFICANT CHANGE UP (ref 10–40)
BASOPHILS # BLD AUTO: 0 K/UL — SIGNIFICANT CHANGE UP (ref 0–0.2)
BASOPHILS NFR BLD AUTO: 0 % — SIGNIFICANT CHANGE UP (ref 0–2)
BILIRUB SERPL-MCNC: 2.2 MG/DL — HIGH (ref 0.2–1.2)
BUN SERPL-MCNC: 21 MG/DL — SIGNIFICANT CHANGE UP (ref 7–23)
CALCIUM SERPL-MCNC: 8.6 MG/DL — SIGNIFICANT CHANGE UP (ref 8.4–10.5)
CHLORIDE SERPL-SCNC: 100 MMOL/L — SIGNIFICANT CHANGE UP (ref 96–108)
CO2 SERPL-SCNC: 25 MMOL/L — SIGNIFICANT CHANGE UP (ref 22–31)
CREAT SERPL-MCNC: 0.78 MG/DL — SIGNIFICANT CHANGE UP (ref 0.5–1.3)
EGFR: 97 ML/MIN/1.73M2 — SIGNIFICANT CHANGE UP
EOSINOPHIL # BLD AUTO: 0.21 K/UL — SIGNIFICANT CHANGE UP (ref 0–0.5)
EOSINOPHIL NFR BLD AUTO: 2 % — SIGNIFICANT CHANGE UP (ref 0–6)
GLUCOSE SERPL-MCNC: 117 MG/DL — HIGH (ref 70–99)
HCT VFR BLD CALC: 31.6 % — LOW (ref 39–50)
HGB BLD-MCNC: 10.7 G/DL — LOW (ref 13–17)
HYPOCHROMIA BLD QL: SLIGHT — SIGNIFICANT CHANGE UP
LYMPHOCYTES # BLD AUTO: 2.23 K/UL — SIGNIFICANT CHANGE UP (ref 1–3.3)
LYMPHOCYTES # BLD AUTO: 21 % — SIGNIFICANT CHANGE UP (ref 13–44)
MANUAL SMEAR VERIFICATION: SIGNIFICANT CHANGE UP
MCHC RBC-ENTMCNC: 31.3 PG — SIGNIFICANT CHANGE UP (ref 27–34)
MCHC RBC-ENTMCNC: 33.9 G/DL — SIGNIFICANT CHANGE UP (ref 32–36)
MCV RBC AUTO: 92.4 FL — SIGNIFICANT CHANGE UP (ref 80–100)
METAMYELOCYTES # FLD: 4 % — HIGH (ref 0–0)
MONOCYTES # BLD AUTO: 0.95 K/UL — HIGH (ref 0–0.9)
MONOCYTES NFR BLD AUTO: 9 % — SIGNIFICANT CHANGE UP (ref 2–14)
MYELOCYTES NFR BLD: 2 % — HIGH (ref 0–0)
NEUTROPHILS # BLD AUTO: 6.58 K/UL — SIGNIFICANT CHANGE UP (ref 1.8–7.4)
NEUTROPHILS NFR BLD AUTO: 61 % — SIGNIFICANT CHANGE UP (ref 43–77)
NEUTS BAND # BLD: 1 % — SIGNIFICANT CHANGE UP (ref 0–8)
NRBC # BLD: 1 /100 WBCS — HIGH (ref 0–0)
PLAT MORPH BLD: NORMAL — SIGNIFICANT CHANGE UP
PLATELET # BLD AUTO: 178 K/UL — SIGNIFICANT CHANGE UP (ref 150–400)
POLYCHROMASIA BLD QL SMEAR: SLIGHT — SIGNIFICANT CHANGE UP
POTASSIUM SERPL-MCNC: 3.6 MMOL/L — SIGNIFICANT CHANGE UP (ref 3.5–5.3)
POTASSIUM SERPL-SCNC: 3.6 MMOL/L — SIGNIFICANT CHANGE UP (ref 3.5–5.3)
PROT SERPL-MCNC: 6.5 G/DL — SIGNIFICANT CHANGE UP (ref 6–8.3)
RBC # BLD: 3.42 M/UL — LOW (ref 4.2–5.8)
RBC # FLD: 14 % — SIGNIFICANT CHANGE UP (ref 10.3–14.5)
RBC BLD AUTO: ABNORMAL
SODIUM SERPL-SCNC: 135 MMOL/L — SIGNIFICANT CHANGE UP (ref 135–145)
WBC # BLD: 10.61 K/UL — HIGH (ref 3.8–10.5)
WBC # FLD AUTO: 10.61 K/UL — HIGH (ref 3.8–10.5)

## 2024-11-23 PROCEDURE — 99232 SBSQ HOSP IP/OBS MODERATE 35: CPT

## 2024-11-23 PROCEDURE — 99223 1ST HOSP IP/OBS HIGH 75: CPT

## 2024-11-23 RX ADMIN — AMLODIPINE BESYLATE 5 MILLIGRAM(S): 10 TABLET ORAL at 09:30

## 2024-11-23 RX ADMIN — ACETAMINOPHEN 500MG 325 MILLIGRAM(S): 500 TABLET, COATED ORAL at 05:50

## 2024-11-23 RX ADMIN — COLLAGENASE SANTYL 1 APPLICATION(S): 250 OINTMENT TOPICAL at 05:52

## 2024-11-23 RX ADMIN — ENOXAPARIN SODIUM 30 MILLIGRAM(S): 30 INJECTION SUBCUTANEOUS at 05:51

## 2024-11-23 RX ADMIN — Medication 15 MILLILITER(S): at 11:21

## 2024-11-23 RX ADMIN — LIDOCAINE 1 PATCH: 40 CREAM TOPICAL at 11:22

## 2024-11-23 RX ADMIN — OXYCODONE HYDROCHLORIDE 2.5 MILLIGRAM(S): 30 TABLET ORAL at 20:05

## 2024-11-23 NOTE — PROGRESS NOTE ADULT - SUBJECTIVE AND OBJECTIVE BOX
CC: Patient being seen for rehabilitation follow up.  slept well not needing tylenol  Pain in the left hip also persists, but improved.   Still not sleeping well.     FUNCTIONAL PROGRESS  11/21 PT  Bed Mobility  Bed Mobility Training Supine-to-Sit: moderate assist (50% patient effort);  1 person assist  Bed Mobility Training Limitations: decreased ability to use legs for bridging/pushing;  decreased ROM;  impaired balance;  decreased strength    Sit-Stand Transfer Training  Transfer Training Sit-to-Stand Transfer: moderate assist (50% patient effort);  2 person assist;  WBAT right LE, TTWB left LE, NWB right UE. No device due to pt report of urgency to use commode  Transfer Training Stand-to-Sit Transfer: moderate assist (50% patient effort);  1 person assist;  WBAT right LE, TTWB left LE, NWB right UE. No device due to pt report of urgency to use commode  Sit-to-Stand Transfer Training Transfer Safety Analysis: decreased balance;  decreased strength    Gait Training  Gait Training Charges: SPT bed to commode with mod assist x 2 WBAT right LE, TTWB left LE, NWB right UE. No device due to pt report of urgency to use commode.     11/19 OT  Bathing Training:     · Level of [place]	maximum assist (25% patients effort)  · Physical Assist/Nonphysical Assist	1 person assist    Upper Body Dressing Training:     · Level of [place]	maximum assist (25% patients effort); to don CTLSO brace  · Physical Assist/Nonphysical Assist	1 person assist    Lower Body Dressing Training:     · Level of [place]	dependent (less than 25% patients effort); to don socks    Toilet Hygiene Training:     · Level of [place]	dependent (less than 25% patients effort); secondary to [place] catheter    Grooming Training:     · Level of [place]	moderate assist (50% patients effort)  · Physical Assist/Nonphysical Assist	1 person assist    Eating/Self-Feeding Training:     · Level of [place]	minimum assist (75% patients effort)  · Physical Assist/Nonphysical Assist	1 person assist      VITALS  Vital Signs Last 24 Hrs  T(C): 36.7 (23 Nov 2024 07:45), Max: 36.9 (22 Nov 2024 12:30)  T(F): 98 (23 Nov 2024 07:45), Max: 98.5 (22 Nov 2024 12:30)  HR: 104 (23 Nov 2024 09:27) (92 - 109)  BP: 120/79 (23 Nov 2024 09:27) (106/68 - 120/79)  BP(mean): --  RR: 16 (23 Nov 2024 07:45) (16 - 20)  SpO2: 94% (23 Nov 2024 09:27) (92% - 95%)    Parameters below as of 23 Nov 2024 09:27  Patient On (Oxygen Delivery Method): room air        MEDICATIONS   MEDICATIONS  (STANDING):  acetaminophen     Tablet .. 650 milliGRAM(s) Oral every 6 hours  amLODIPine   Tablet 5 milliGRAM(s) Oral daily  collagenase Ointment 1 Application(s) Topical daily  enoxaparin Injectable 30 milliGRAM(s) SubCutaneous every 12 hours  lidocaine   4% Patch 1 Patch Transdermal daily  melatonin 6 milliGRAM(s) Oral at bedtime  multivitamin/minerals/iron Oral Solution (CENTRUM) 15 milliLiter(s) Oral daily  polyethylene glycol 3350 17 Gram(s) Oral daily  senna 2 Tablet(s) Oral at bedtime  tamsulosin 0.4 milliGRAM(s) Oral at bedtime    MEDICATIONS  (PRN):  artificial tears (preservative free) Ophthalmic Solution 1 Drop(s) Both EYES three times a day PRN Dry Eyes  bisacodyl Suppository 10 milliGRAM(s) Rectal daily PRN Constipation  oxyCODONE    IR 2.5 milliGRAM(s) Oral every 4 hours PRN Moderate Pain (4 - 6)  oxyCODONE    IR 5 milliGRAM(s) Oral every 4 hours PRN Severe Pain (7 - 10)  simethicone 80 milliGRAM(s) Chew three times a day PRN Gas        RECENT LABS/IMAGING  - Reviewed Today                        9.8    12.09 )-----------( 145      ( 21 Nov 2024 05:30 )             28.4                         RIGHT HAND XR 11/15 - Left inner acetabular fracture with pelvic hematoma. Nondisplaced fracture of the proximal phalanx of the third right finger.    CT ABD/PEL 11/15 - Multiple left-sided pelvic fractures, increased involving the ileum, anterior and posterior acetabulum, pubic body and inferior pubic ramus. Small amount of associated extraperitoneal pelvic hematoma. No active bleeding is identified within the limitations of the CT angiography technique. CT which was performed without    MRI C & T SPINE 11/16 - No cervical acute fracture. Cervical degenerative changes. Thoracic spine: Acute fractures of the T2-T4 superior endplates, without bony retropulsion.    MRI HEAD 11/16 - No acute infarct, hemorrhage, or mass effect.    ----------------------------------------------------------------------------------------  PHYSICAL EXAM  Constitutional - NAD, Appears Comfortable  HEENT - Large left frontal scalp hematoma - Stable  Extremities - Left LE swelling  Neurologic Exam -                    Cognitive - AAO to self, place, date, year, situation     Communication - Fluent, No dysarthria     Cranial Nerves - CN 2-12 intact     FUNCTIONAL MOTOR EXAM -                      LEFT    UE - ShAB 5/5, EF 5/5, EE 5/5, WE 5/5,  5/5                    RIGHT UE - ShAB 5/5, EF 5/5, EE 5/5, WE 1/5,  2/5                    LEFT    LE - HF 1/5, KE 2/5, DF 5/5, PF 5/5                    RIGHT LE - HF 5/5, KE 5/5, DF 5/5, PF 5/5        Sensory - Intact to LT  Psychiatric - Fatigued  ----------------------------------------------------------------------------------------      CC: Patient being seen for rehabilitation follow up.  slept well not needing tylenol  Pain in the left hip also persists, but improved.   Still not sleeping well.   difficulty with transfer collar not available  VITALS  Vital Signs Last 24 Hrs  T(C): 36.7 (23 Nov 2024 07:45), Max: 36.9 (22 Nov 2024 12:30)  T(F): 98 (23 Nov 2024 07:45), Max: 98.5 (22 Nov 2024 12:30)  HR: 104 (23 Nov 2024 09:27) (92 - 109)  BP: 120/79 (23 Nov 2024 09:27) (106/68 - 120/79)  BP(mean): --  RR: 16 (23 Nov 2024 07:45) (16 - 20)  SpO2: 94% (23 Nov 2024 09:27) (92% - 95%)    Parameters below as of 23 Nov 2024 09:27  Patient On (Oxygen Delivery Method): room air        MEDICATIONS   MEDICATIONS  (STANDING):  acetaminophen     Tablet .. 650 milliGRAM(s) Oral every 6 hours  amLODIPine   Tablet 5 milliGRAM(s) Oral daily  collagenase Ointment 1 Application(s) Topical daily  enoxaparin Injectable 30 milliGRAM(s) SubCutaneous every 12 hours  lidocaine   4% Patch 1 Patch Transdermal daily  melatonin 6 milliGRAM(s) Oral at bedtime  multivitamin/minerals/iron Oral Solution (CENTRUM) 15 milliLiter(s) Oral daily  polyethylene glycol 3350 17 Gram(s) Oral daily  senna 2 Tablet(s) Oral at bedtime  tamsulosin 0.4 milliGRAM(s) Oral at bedtime    MEDICATIONS  (PRN):  artificial tears (preservative free) Ophthalmic Solution 1 Drop(s) Both EYES three times a day PRN Dry Eyes  bisacodyl Suppository 10 milliGRAM(s) Rectal daily PRN Constipation  oxyCODONE    IR 2.5 milliGRAM(s) Oral every 4 hours PRN Moderate Pain (4 - 6)  oxyCODONE    IR 5 milliGRAM(s) Oral every 4 hours PRN Severe Pain (7 - 10)  simethicone 80 milliGRAM(s) Chew three times a day PRN Gas        RECENT LABS/IMAGING  - Reviewed Today                        9.8    12.09 )-----------( 145      ( 21 Nov 2024 05:30 )             28.4                         RIGHT HAND XR 11/15 - Left inner acetabular fracture with pelvic hematoma. Nondisplaced fracture of the proximal phalanx of the third right finger.    CT ABD/PEL 11/15 - Multiple left-sided pelvic fractures, increased involving the ileum, anterior and posterior acetabulum, pubic body and inferior pubic ramus. Small amount of associated extraperitoneal pelvic hematoma. No active bleeding is identified within the limitations of the CT angiography technique. CT which was performed without    MRI C & T SPINE 11/16 - No cervical acute fracture. Cervical degenerative changes. Thoracic spine: Acute fractures of the T2-T4 superior endplates, without bony retropulsion.    MRI HEAD 11/16 - No acute infarct, hemorrhage, or mass effect.    ----------------------------------------------------------------------------------------  PHYSICAL EXAM  Constitutional - NAD, Appears Comfortable  HEENT - Large left frontal scalp hematoma - Stable  Extremities - Left LE swelling  Neurologic Exam -                    Cognitive - AAO to self, place, date, year, situation     Communication - Fluent, No dysarthria     Cranial Nerves - CN 2-12 intact     FUNCTIONAL MOTOR EXAM -                      LEFT    UE - ShAB 5/5, EF 5/5, EE 5/5, WE 5/5,  5/5                    RIGHT UE - ShAB 5/5, EF 5/5, EE 5/5, WE 1/5,  2/5                    LEFT    LE - HF 1/5, KE 2/5, DF 5/5, PF 5/5                    RIGHT LE - HF 5/5, KE 5/5, DF 5/5, PF 5/5        Sensory - Intact to LT  Psychiatric - Fatigued  ----------------------------------------------------------------------------------------
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
## 2024-11-24 PROCEDURE — 99232 SBSQ HOSP IP/OBS MODERATE 35: CPT

## 2024-11-24 RX ORDER — FAMOTIDINE 20 MG/1
20 TABLET, FILM COATED ORAL DAILY
Refills: 0 | Status: DISCONTINUED | OUTPATIENT
Start: 2024-11-24 | End: 2024-11-26

## 2024-11-24 RX ADMIN — ENOXAPARIN SODIUM 30 MILLIGRAM(S): 30 INJECTION SUBCUTANEOUS at 05:30

## 2024-11-24 RX ADMIN — ENOXAPARIN SODIUM 30 MILLIGRAM(S): 30 INJECTION SUBCUTANEOUS at 17:57

## 2024-11-24 RX ADMIN — Medication 15 MILLILITER(S): at 12:49

## 2024-11-24 RX ADMIN — COLLAGENASE SANTYL 1 APPLICATION(S): 250 OINTMENT TOPICAL at 05:30

## 2024-11-24 RX ADMIN — TAMSULOSIN HYDROCHLORIDE 0.4 MILLIGRAM(S): 0.4 CAPSULE ORAL at 21:17

## 2024-11-24 RX ADMIN — OXYCODONE HYDROCHLORIDE 2.5 MILLIGRAM(S): 30 TABLET ORAL at 09:59

## 2024-11-24 RX ADMIN — LIDOCAINE 1 PATCH: 40 CREAM TOPICAL at 12:41

## 2024-11-24 RX ADMIN — ACETAMINOPHEN 500MG 325 MILLIGRAM(S): 500 TABLET, COATED ORAL at 05:29

## 2024-11-24 RX ADMIN — FAMOTIDINE 20 MILLIGRAM(S): 20 TABLET, FILM COATED ORAL at 12:49

## 2024-11-24 RX ADMIN — LIDOCAINE 1 PATCH: 40 CREAM TOPICAL at 19:32

## 2024-11-24 RX ADMIN — OXYCODONE HYDROCHLORIDE 2.5 MILLIGRAM(S): 30 TABLET ORAL at 08:59

## 2024-11-24 RX ADMIN — Medication 80 MILLIGRAM(S): at 21:17

## 2024-11-24 RX ADMIN — ACETAMINOPHEN 500MG 325 MILLIGRAM(S): 500 TABLET, COATED ORAL at 17:58

## 2024-11-24 RX ADMIN — ACETAMINOPHEN, DIPHENHYDRAMINE HCL, PHENYLEPHRINE HCL 6 MILLIGRAM(S): 325; 25; 5 TABLET ORAL at 21:17

## 2024-11-24 RX ADMIN — ACETAMINOPHEN 500MG 650 MILLIGRAM(S): 500 TABLET, COATED ORAL at 18:28

## 2024-11-24 RX ADMIN — AMLODIPINE BESYLATE 5 MILLIGRAM(S): 10 TABLET ORAL at 08:59
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
## 2024-11-24 NOTE — PROGRESS NOTE ADULT - SUBJECTIVE AND OBJECTIVE BOX
CC: Patient being seen for rehabilitation follow up.  slept well  Pain in the left hip also persists, but improved.     difficulty with transfer collar not available    had detailed discussion with bedside OT  MARISELA RUSSELL   because of thoracic fracture he suffered    VITALS  Vital Signs Last 24 Hrs  Vital Signs Last 24 Hrs  T(C): 36.7 (24 Nov 2024 07:35), Max: 36.7 (24 Nov 2024 07:35)  T(F): 98.1 (24 Nov 2024 07:35), Max: 98.1 (24 Nov 2024 07:35)  HR: 103 (24 Nov 2024 08:43) (87 - 103)  BP: 124/73 (24 Nov 2024 08:43) (105/66 - 124/73)  BP(mean): --  RR: 16 (24 Nov 2024 07:35) (16 - 16)  SpO2: 94% (24 Nov 2024 08:43) (94% - 96%)    Parameters below as of 24 Nov 2024 08:43  Patient On (Oxygen Delivery Method): room air            MEDICATIONS   MEDICATIONS  (STANDING):  acetaminophen     Tablet .. 650 milliGRAM(s) Oral every 6 hours  amLODIPine   Tablet 5 milliGRAM(s) Oral daily  collagenase Ointment 1 Application(s) Topical daily  enoxaparin Injectable 30 milliGRAM(s) SubCutaneous every 12 hours  lidocaine   4% Patch 1 Patch Transdermal daily  melatonin 6 milliGRAM(s) Oral at bedtime  multivitamin/minerals/iron Oral Solution (CENTRUM) 15 milliLiter(s) Oral daily  polyethylene glycol 3350 17 Gram(s) Oral daily  senna 2 Tablet(s) Oral at bedtime  tamsulosin 0.4 milliGRAM(s) Oral at bedtime    MEDICATIONS  (PRN):  artificial tears (preservative free) Ophthalmic Solution 1 Drop(s) Both EYES three times a day PRN Dry Eyes  bisacodyl Suppository 10 milliGRAM(s) Rectal daily PRN Constipation  oxyCODONE    IR 2.5 milliGRAM(s) Oral every 4 hours PRN Moderate Pain (4 - 6)  oxyCODONE    IR 5 milliGRAM(s) Oral every 4 hours PRN Severe Pain (7 - 10)  simethicone 80 milliGRAM(s) Chew three times a day PRN Gas        RECENT LABS/IMAGING  - Reviewed Today                        9.8    12.09 )-----------( 145      ( 21 Nov 2024 05:30 )             28.4                         RIGHT HAND XR 11/15 - Left inner acetabular fracture with pelvic hematoma. Nondisplaced fracture of the proximal phalanx of the third right finger.    CT ABD/PEL 11/15 - Multiple left-sided pelvic fractures, increased involving the ileum, anterior and posterior acetabulum, pubic body and inferior pubic ramus. Small amount of associated extraperitoneal pelvic hematoma. No active bleeding is identified within the limitations of the CT angiography technique. CT which was performed without    MRI C & T SPINE 11/16 - No cervical acute fracture. Cervical degenerative changes. Thoracic spine: Acute fractures of the T2-T4 superior endplates, without bony retropulsion.    MRI HEAD 11/16 - No acute infarct, hemorrhage, or mass effect.    ----------------------------------------------------------------------------------------  PHYSICAL EXAM  Constitutional - NAD, Appears Comfortable  HEENT - Large left frontal scalp hematoma - Stable  Extremities - Left LE swelling  Neurologic Exam -                    Cognitive - AAO to self, place, date, year, situation     Communication - Fluent, No dysarthria     Cranial Nerves - CN 2-12 intact     FUNCTIONAL MOTOR EXAM -                      LEFT    UE - ShAB 5/5, EF 5/5, EE 5/5, WE 5/5,  5/5                    RIGHT UE - ShAB 5/5, EF 5/5, EE 5/5, WE 1/5,  2/5                    LEFT    LE - HF 1/5, KE 2/5, DF 5/5, PF 5/5                    RIGHT LE - HF 5/5, KE 5/5, DF 5/5, PF 5/5        Sensory - Intact to LT  Psychiatric - Fatigued  ----------------------------------------------------------------------------------------
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
## 2024-11-24 NOTE — PROGRESS NOTE ADULT - SUBJECTIVE AND OBJECTIVE BOX
Patient is a 69y old  Male who presents with a chief complaint of pelvic Fracture (23 Nov 2024 11:29)      SUBJECTIVE / OVERNIGHT EVENTS:  Pt seen and examined at bedside. No acute events overnight.  Pt denies cp, palpitations, sob, abd pain, N/V, fever, chills.    ROS:  All other review of systems negative    Allergies    No Known Allergies    Intolerances        MEDICATIONS  (STANDING):  acetaminophen     Tablet .. 650 milliGRAM(s) Oral every 6 hours  amLODIPine   Tablet 5 milliGRAM(s) Oral daily  collagenase Ointment 1 Application(s) Topical daily  enoxaparin Injectable 30 milliGRAM(s) SubCutaneous every 12 hours  lidocaine   4% Patch 1 Patch Transdermal daily  melatonin 6 milliGRAM(s) Oral at bedtime  multivitamin/minerals/iron Oral Solution (CENTRUM) 15 milliLiter(s) Oral daily  polyethylene glycol 3350 17 Gram(s) Oral daily  senna 2 Tablet(s) Oral at bedtime  tamsulosin 0.4 milliGRAM(s) Oral at bedtime    MEDICATIONS  (PRN):  artificial tears (preservative free) Ophthalmic Solution 1 Drop(s) Both EYES three times a day PRN Dry Eyes  bisacodyl Suppository 10 milliGRAM(s) Rectal daily PRN Constipation  oxyCODONE    IR 2.5 milliGRAM(s) Oral every 4 hours PRN Moderate Pain (4 - 6)  oxyCODONE    IR 5 milliGRAM(s) Oral every 4 hours PRN Severe Pain (7 - 10)  simethicone 80 milliGRAM(s) Chew three times a day PRN Gas      Vital Signs Last 24 Hrs  T(C): 36.7 (24 Nov 2024 07:35), Max: 36.7 (24 Nov 2024 07:35)  T(F): 98.1 (24 Nov 2024 07:35), Max: 98.1 (24 Nov 2024 07:35)  HR: 87 (24 Nov 2024 07:35) (87 - 104)  BP: 105/66 (24 Nov 2024 07:35) (105/66 - 120/79)  BP(mean): --  RR: 16 (24 Nov 2024 07:35) (16 - 16)  SpO2: 96% (24 Nov 2024 07:35) (94% - 96%)    Parameters below as of 24 Nov 2024 07:35  Patient On (Oxygen Delivery Method): room air      CAPILLARY BLOOD GLUCOSE        I&O's Summary      PHYSICAL EXAM:  GENERAL: NAD, well-developed AAOx3 male   HEAD:  Left Forehead hematoma  NECK: Supple, No JVD  CHEST/LUNG: Clear to auscultation bilaterally; No wheeze, nonlabored breathing  HEART: Regular rate and rhythm; No murmurs, rubs, or gallops  ABDOMEN: Soft, Nontender, Nondistended; Bowel sounds present  EXTREMITIES: No clubbing, cyanosis, or edema  PSYCH: calm, appropriate mood      LABS:                        10.7   10.61 )-----------( 178      ( 23 Nov 2024 05:50 )             31.6     11-23    135  |  100  |  21  ----------------------------<  117[H]  3.6   |  25  |  0.78    Ca    8.6      23 Nov 2024 05:50    TPro  6.5  /  Alb  3.1[L]  /  TBili  2.2[H]  /  DBili  x   /  AST  33  /  ALT  38  /  AlkPhos  94  11-23          Urinalysis Basic - ( 23 Nov 2024 05:50 )    Color: x / Appearance: x / SG: x / pH: x  Gluc: 117 mg/dL / Ketone: x  / Bili: x / Urobili: x   Blood: x / Protein: x / Nitrite: x   Leuk Esterase: x / RBC: x / WBC x   Sq Epi: x / Non Sq Epi: x / Bacteria: x        RADIOLOGY & ADDITIONAL TESTS:  Results Reviewed:   Imaging Personally Reviewed:  Electrocardiogram Personally Reviewed:    COORDINATION OF CARE:  Care Discussed with Consultants/Other Providers [Y/N]:  Prior or Outpatient Records Reviewed [Y/N]:

## 2024-11-25 LAB
ALBUMIN SERPL ELPH-MCNC: 2.9 G/DL — LOW (ref 3.3–5)
ALP SERPL-CCNC: 101 U/L — SIGNIFICANT CHANGE UP (ref 40–120)
ALT FLD-CCNC: 32 U/L — SIGNIFICANT CHANGE UP (ref 10–45)
ANION GAP SERPL CALC-SCNC: 9 MMOL/L — SIGNIFICANT CHANGE UP (ref 5–17)
AST SERPL-CCNC: 32 U/L — SIGNIFICANT CHANGE UP (ref 10–40)
BILIRUB SERPL-MCNC: 2 MG/DL — HIGH (ref 0.2–1.2)
BUN SERPL-MCNC: 20 MG/DL — SIGNIFICANT CHANGE UP (ref 7–23)
CALCIUM SERPL-MCNC: 8.8 MG/DL — SIGNIFICANT CHANGE UP (ref 8.4–10.5)
CHLORIDE SERPL-SCNC: 104 MMOL/L — SIGNIFICANT CHANGE UP (ref 96–108)
CO2 SERPL-SCNC: 26 MMOL/L — SIGNIFICANT CHANGE UP (ref 22–31)
CREAT SERPL-MCNC: 0.74 MG/DL — SIGNIFICANT CHANGE UP (ref 0.5–1.3)
EGFR: 99 ML/MIN/1.73M2 — SIGNIFICANT CHANGE UP
GLUCOSE SERPL-MCNC: 112 MG/DL — HIGH (ref 70–99)
POTASSIUM SERPL-MCNC: 3.7 MMOL/L — SIGNIFICANT CHANGE UP (ref 3.5–5.3)
POTASSIUM SERPL-SCNC: 3.7 MMOL/L — SIGNIFICANT CHANGE UP (ref 3.5–5.3)
PROT SERPL-MCNC: 6.3 G/DL — SIGNIFICANT CHANGE UP (ref 6–8.3)
SODIUM SERPL-SCNC: 139 MMOL/L — SIGNIFICANT CHANGE UP (ref 135–145)

## 2024-11-25 PROCEDURE — 93971 EXTREMITY STUDY: CPT | Mod: 26,LT

## 2024-11-25 PROCEDURE — 99233 SBSQ HOSP IP/OBS HIGH 50: CPT | Mod: GC

## 2024-11-25 PROCEDURE — 74177 CT ABD & PELVIS W/CONTRAST: CPT | Mod: 26

## 2024-11-25 PROCEDURE — 93010 ELECTROCARDIOGRAM REPORT: CPT

## 2024-11-25 RX ORDER — ENOXAPARIN SODIUM 30 MG/.3ML
40 INJECTION SUBCUTANEOUS ONCE
Refills: 0 | Status: DISCONTINUED | OUTPATIENT
Start: 2024-11-25 | End: 2024-11-25

## 2024-11-25 RX ORDER — HYDROMORPHONE HYDROCHLORIDE 2 MG/1
2 TABLET ORAL ONCE
Refills: 0 | Status: DISCONTINUED | OUTPATIENT
Start: 2024-11-25 | End: 2024-11-25

## 2024-11-25 RX ORDER — BACITRACIN ZINC 500 UNIT/G
1 OINTMENT (GRAM) TOPICAL
Refills: 0 | Status: DISCONTINUED | OUTPATIENT
Start: 2024-11-25 | End: 2024-11-26

## 2024-11-25 RX ORDER — OXYCODONE AND ACETAMINOPHEN 5; 325 MG/1; MG/1
1 TABLET ORAL EVERY 8 HOURS
Refills: 0 | Status: DISCONTINUED | OUTPATIENT
Start: 2024-11-26 | End: 2024-11-26

## 2024-11-25 RX ORDER — CEPHALEXIN 500 MG
500 CAPSULE ORAL
Refills: 0 | Status: DISCONTINUED | OUTPATIENT
Start: 2024-11-25 | End: 2024-11-26

## 2024-11-25 RX ORDER — ENOXAPARIN SODIUM 30 MG/.3ML
70 INJECTION SUBCUTANEOUS EVERY 12 HOURS
Refills: 0 | Status: DISCONTINUED | OUTPATIENT
Start: 2024-11-25 | End: 2024-11-26

## 2024-11-25 RX ADMIN — OXYCODONE HYDROCHLORIDE 5 MILLIGRAM(S): 30 TABLET ORAL at 16:27

## 2024-11-25 RX ADMIN — OXYCODONE HYDROCHLORIDE 5 MILLIGRAM(S): 30 TABLET ORAL at 17:04

## 2024-11-25 RX ADMIN — LIDOCAINE 1 PATCH: 40 CREAM TOPICAL at 00:06

## 2024-11-25 RX ADMIN — TAMSULOSIN HYDROCHLORIDE 0.4 MILLIGRAM(S): 0.4 CAPSULE ORAL at 21:18

## 2024-11-25 RX ADMIN — AMLODIPINE BESYLATE 5 MILLIGRAM(S): 10 TABLET ORAL at 06:22

## 2024-11-25 RX ADMIN — ENOXAPARIN SODIUM 70 MILLIGRAM(S): 30 INJECTION SUBCUTANEOUS at 21:15

## 2024-11-25 RX ADMIN — ACETAMINOPHEN 500MG 650 MILLIGRAM(S): 500 TABLET, COATED ORAL at 06:54

## 2024-11-25 RX ADMIN — HYDROMORPHONE HYDROCHLORIDE 2 MILLIGRAM(S): 2 TABLET ORAL at 19:04

## 2024-11-25 RX ADMIN — Medication 80 MILLIGRAM(S): at 21:17

## 2024-11-25 RX ADMIN — HYDROMORPHONE HYDROCHLORIDE 2 MILLIGRAM(S): 2 TABLET ORAL at 18:17

## 2024-11-25 RX ADMIN — ENOXAPARIN SODIUM 30 MILLIGRAM(S): 30 INJECTION SUBCUTANEOUS at 18:17

## 2024-11-25 RX ADMIN — Medication 1 APPLICATION(S): at 18:47

## 2024-11-25 RX ADMIN — ENOXAPARIN SODIUM 30 MILLIGRAM(S): 30 INJECTION SUBCUTANEOUS at 06:22

## 2024-11-25 RX ADMIN — ACETAMINOPHEN 500MG 650 MILLIGRAM(S): 500 TABLET, COATED ORAL at 12:39

## 2024-11-25 RX ADMIN — ACETAMINOPHEN 500MG 650 MILLIGRAM(S): 500 TABLET, COATED ORAL at 19:04

## 2024-11-25 RX ADMIN — Medication 15 MILLILITER(S): at 11:44

## 2024-11-25 RX ADMIN — ACETAMINOPHEN 500MG 650 MILLIGRAM(S): 500 TABLET, COATED ORAL at 11:44

## 2024-11-25 RX ADMIN — FAMOTIDINE 20 MILLIGRAM(S): 20 TABLET, FILM COATED ORAL at 11:44

## 2024-11-25 RX ADMIN — ACETAMINOPHEN 500MG 650 MILLIGRAM(S): 500 TABLET, COATED ORAL at 18:17

## 2024-11-25 RX ADMIN — Medication 500 MILLIGRAM(S): at 18:47

## 2024-11-25 RX ADMIN — ACETAMINOPHEN 500MG 650 MILLIGRAM(S): 500 TABLET, COATED ORAL at 06:22

## 2024-11-25 NOTE — DIETITIAN INITIAL EVALUATION ADULT - PERTINENT MEDS FT
MEDICATIONS  (STANDING):  acetaminophen     Tablet .. 650 milliGRAM(s) Oral every 6 hours  amLODIPine   Tablet 5 milliGRAM(s) Oral daily  collagenase Ointment 1 Application(s) Topical daily  enoxaparin Injectable 30 milliGRAM(s) SubCutaneous every 12 hours  famotidine    Tablet 20 milliGRAM(s) Oral daily  lidocaine   4% Patch 1 Patch Transdermal daily  melatonin 6 milliGRAM(s) Oral at bedtime  multivitamin/minerals/iron Oral Solution (CENTRUM) 15 milliLiter(s) Oral daily  polyethylene glycol 3350 17 Gram(s) Oral daily  senna 2 Tablet(s) Oral at bedtime  tamsulosin 0.4 milliGRAM(s) Oral at bedtime    MEDICATIONS  (PRN):  artificial tears (preservative free) Ophthalmic Solution 1 Drop(s) Both EYES three times a day PRN Dry Eyes  bisacodyl Suppository 10 milliGRAM(s) Rectal daily PRN Constipation  oxyCODONE    IR 2.5 milliGRAM(s) Oral every 4 hours PRN Moderate Pain (4 - 6)  oxyCODONE    IR 5 milliGRAM(s) Oral every 4 hours PRN Severe Pain (7 - 10)  simethicone 80 milliGRAM(s) Chew three times a day PRN Gas

## 2024-11-25 NOTE — DIETITIAN INITIAL EVALUATION ADULT - NS FNS DIET ORDER
on Regular Diet (IDDSI Level 7) w/ Thin Liquids (IDDSI Level 0)   Recommend Initiate Ensure Clear 8oz PO Daily (Provides 240kcal & 8grams of Protein)   Education Provided on Need for Supplementation & Proper Nutrition
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
## 2024-11-25 NOTE — DIETITIAN INITIAL EVALUATION ADULT - PERTINENT LABORATORY DATA
11-25    139  |  104  |  20  ----------------------------<  112[H]  3.7   |  26  |  0.74    Ca    8.8      25 Nov 2024 07:42    TPro  6.3  /  Alb  2.9[L]  /  TBili  2.0[H]  /  DBili  x   /  AST  32  /  ALT  32  /  AlkPhos  101  11-25

## 2024-11-25 NOTE — DIETITIAN INITIAL EVALUATION ADULT - OTHER INFO
Initial Nutrition Assessment   69yr Old Male   Denies Food Allergy/Intolerance  Tolerates Diet Well - No Chewing/Swallowing Complications (Per Patient)  Consumed % of 1st Meals (as Per Documentation)  No Pressure Ulcers (as Per Nursing Flow Sheets)  No Edema Noted (as Per Nursing Flow Sheets)  No Recent Nausea/Vomiting/Diarrhea & Some Recent Constipation (as Per Patient)

## 2024-11-25 NOTE — DIETITIAN INITIAL EVALUATION ADULT - ADD RECOMMEND
1) Monitor Weights, Intake, Tolerance, Skin & Labwork  2) Education Provided on Proper Nutrition & Need for Supplementation   3) Ensure Clear 8oz PO Daily (Provides 240kcal & 8grams of Protein)   4) Continue Nutrition Plan of Care
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
## 2024-11-25 NOTE — DIETITIAN INITIAL EVALUATION ADULT - FACTORS AFF FOOD INTAKE
States Fair Intake over Last Week   Consuming Far Less Prior to Admission to Hospital (Per Patient)/persistent constipation/persistent lack of appetite

## 2024-11-25 NOTE — DIETITIAN INITIAL EVALUATION ADULT - ORAL INTAKE PTA/DIET HISTORY
Patient Does Not Follow Diet @Home  Consumes 2 Meals a Day   Family Usually Cooks For Patient   Doesn't Take Vitamin/Supplements @Home

## 2024-11-26 ENCOUNTER — INPATIENT (INPATIENT)
Facility: HOSPITAL | Age: 69
LOS: 7 days | Discharge: HOME | DRG: 594 | End: 2024-12-04
Attending: STUDENT IN AN ORGANIZED HEALTH CARE EDUCATION/TRAINING PROGRAM | Admitting: STUDENT IN AN ORGANIZED HEALTH CARE EDUCATION/TRAINING PROGRAM
Payer: MEDICAID

## 2024-11-26 VITALS
SYSTOLIC BLOOD PRESSURE: 125 MMHG | RESPIRATION RATE: 16 BRPM | HEART RATE: 114 BPM | DIASTOLIC BLOOD PRESSURE: 80 MMHG | OXYGEN SATURATION: 92 % | HEIGHT: 67 IN | WEIGHT: 164.91 LBS | TEMPERATURE: 100 F

## 2024-11-26 VITALS
SYSTOLIC BLOOD PRESSURE: 111 MMHG | TEMPERATURE: 99 F | DIASTOLIC BLOOD PRESSURE: 78 MMHG | HEART RATE: 114 BPM | OXYGEN SATURATION: 94 % | RESPIRATION RATE: 16 BRPM

## 2024-11-26 DIAGNOSIS — L98.499 NON-PRESSURE CHRONIC ULCER OF SKIN OF OTHER SITES WITH UNSPECIFIED SEVERITY: ICD-10-CM

## 2024-11-26 LAB
ALBUMIN SERPL ELPH-MCNC: 3.1 G/DL — LOW (ref 3.3–5)
ALBUMIN SERPL ELPH-MCNC: 3.5 G/DL — SIGNIFICANT CHANGE UP (ref 3.3–5.2)
ALP SERPL-CCNC: 113 U/L — SIGNIFICANT CHANGE UP (ref 40–120)
ALP SERPL-CCNC: 117 U/L — SIGNIFICANT CHANGE UP (ref 40–120)
ALT FLD-CCNC: 19 U/L — SIGNIFICANT CHANGE UP
ALT FLD-CCNC: 26 U/L — SIGNIFICANT CHANGE UP (ref 10–45)
ANION GAP SERPL CALC-SCNC: 14 MMOL/L — SIGNIFICANT CHANGE UP (ref 5–17)
ANION GAP SERPL CALC-SCNC: 9 MMOL/L — SIGNIFICANT CHANGE UP (ref 5–17)
APTT BLD: 36 SEC — HIGH (ref 24.5–35.6)
AST SERPL-CCNC: 22 U/L — SIGNIFICANT CHANGE UP
AST SERPL-CCNC: 24 U/L — SIGNIFICANT CHANGE UP (ref 10–40)
BASOPHILS # BLD AUTO: 0.05 K/UL — SIGNIFICANT CHANGE UP (ref 0–0.2)
BASOPHILS NFR BLD AUTO: 0.4 % — SIGNIFICANT CHANGE UP (ref 0–2)
BILIRUB SERPL-MCNC: 1.9 MG/DL — SIGNIFICANT CHANGE UP (ref 0.4–2)
BILIRUB SERPL-MCNC: 2 MG/DL — HIGH (ref 0.2–1.2)
BLD GP AB SCN SERPL QL: SIGNIFICANT CHANGE UP
BUN SERPL-MCNC: 23 MG/DL — SIGNIFICANT CHANGE UP (ref 7–23)
BUN SERPL-MCNC: 23.8 MG/DL — HIGH (ref 8–20)
CALCIUM SERPL-MCNC: 8.6 MG/DL — SIGNIFICANT CHANGE UP (ref 8.4–10.5)
CALCIUM SERPL-MCNC: 9 MG/DL — SIGNIFICANT CHANGE UP (ref 8.4–10.5)
CHLORIDE SERPL-SCNC: 100 MMOL/L — SIGNIFICANT CHANGE UP (ref 96–108)
CHLORIDE SERPL-SCNC: 97 MMOL/L — SIGNIFICANT CHANGE UP (ref 96–108)
CK MB BLD-MCNC: 2.1 % — SIGNIFICANT CHANGE UP (ref 0–3.5)
CK MB CFR SERPL CALC: 0.7 NG/ML — SIGNIFICANT CHANGE UP (ref 0.5–10)
CK SERPL-CCNC: 33 U/L — SIGNIFICANT CHANGE UP (ref 30–200)
CO2 SERPL-SCNC: 21 MMOL/L — LOW (ref 22–29)
CO2 SERPL-SCNC: 25 MMOL/L — SIGNIFICANT CHANGE UP (ref 22–31)
CREAT SERPL-MCNC: 0.64 MG/DL — SIGNIFICANT CHANGE UP (ref 0.5–1.3)
CREAT SERPL-MCNC: 0.8 MG/DL — SIGNIFICANT CHANGE UP (ref 0.5–1.3)
EGFR: 103 ML/MIN/1.73M2 — SIGNIFICANT CHANGE UP
EGFR: 96 ML/MIN/1.73M2 — SIGNIFICANT CHANGE UP
EOSINOPHIL # BLD AUTO: 0.22 K/UL — SIGNIFICANT CHANGE UP (ref 0–0.5)
EOSINOPHIL NFR BLD AUTO: 1.9 % — SIGNIFICANT CHANGE UP (ref 0–6)
GLUCOSE SERPL-MCNC: 119 MG/DL — HIGH (ref 70–99)
GLUCOSE SERPL-MCNC: 126 MG/DL — HIGH (ref 70–99)
HCT VFR BLD CALC: 32.2 % — LOW (ref 39–50)
HGB BLD-MCNC: 10.9 G/DL — LOW (ref 13–17)
IMM GRANULOCYTES NFR BLD AUTO: 1.9 % — HIGH (ref 0–0.9)
INR BLD: 1.19 RATIO — HIGH (ref 0.85–1.16)
LACTATE BLDV-MCNC: 1.7 MMOL/L — SIGNIFICANT CHANGE UP (ref 0.5–2)
LYMPHOCYTES # BLD AUTO: 1.49 K/UL — SIGNIFICANT CHANGE UP (ref 1–3.3)
LYMPHOCYTES # BLD AUTO: 12.5 % — LOW (ref 13–44)
MCHC RBC-ENTMCNC: 30.9 PG — SIGNIFICANT CHANGE UP (ref 27–34)
MCHC RBC-ENTMCNC: 33.9 G/DL — SIGNIFICANT CHANGE UP (ref 32–36)
MCV RBC AUTO: 91.2 FL — SIGNIFICANT CHANGE UP (ref 80–100)
MONOCYTES # BLD AUTO: 1.09 K/UL — HIGH (ref 0–0.9)
MONOCYTES NFR BLD AUTO: 9.2 % — SIGNIFICANT CHANGE UP (ref 2–14)
NEUTROPHILS # BLD AUTO: 8.82 K/UL — HIGH (ref 1.8–7.4)
NEUTROPHILS NFR BLD AUTO: 74.1 % — SIGNIFICANT CHANGE UP (ref 43–77)
NT-PROBNP SERPL-SCNC: 101 PG/ML — SIGNIFICANT CHANGE UP (ref 0–300)
PLATELET # BLD AUTO: 177 K/UL — SIGNIFICANT CHANGE UP (ref 150–400)
POTASSIUM SERPL-MCNC: 3.4 MMOL/L — LOW (ref 3.5–5.3)
POTASSIUM SERPL-MCNC: 4.1 MMOL/L — SIGNIFICANT CHANGE UP (ref 3.5–5.3)
POTASSIUM SERPL-SCNC: 3.4 MMOL/L — LOW (ref 3.5–5.3)
POTASSIUM SERPL-SCNC: 4.1 MMOL/L — SIGNIFICANT CHANGE UP (ref 3.5–5.3)
PROT SERPL-MCNC: 6.5 G/DL — LOW (ref 6.6–8.7)
PROT SERPL-MCNC: 6.7 G/DL — SIGNIFICANT CHANGE UP (ref 6–8.3)
PROTHROM AB SERPL-ACNC: 13.4 SEC — SIGNIFICANT CHANGE UP (ref 9.9–13.4)
RBC # BLD: 3.53 M/UL — LOW (ref 4.2–5.8)
RBC # FLD: 14.6 % — HIGH (ref 10.3–14.5)
SODIUM SERPL-SCNC: 132 MMOL/L — LOW (ref 135–145)
SODIUM SERPL-SCNC: 134 MMOL/L — LOW (ref 135–145)
TROPONIN I, HIGH SENSITIVITY RESULT: 5.2 NG/L — SIGNIFICANT CHANGE UP
WBC # BLD: 11.89 K/UL — HIGH (ref 3.8–10.5)
WBC # FLD AUTO: 11.89 K/UL — HIGH (ref 3.8–10.5)

## 2024-11-26 PROCEDURE — 93306 TTE W/DOPPLER COMPLETE: CPT

## 2024-11-26 PROCEDURE — 85025 COMPLETE CBC W/AUTO DIFF WBC: CPT

## 2024-11-26 PROCEDURE — 99233 SBSQ HOSP IP/OBS HIGH 50: CPT

## 2024-11-26 PROCEDURE — 93005 ELECTROCARDIOGRAM TRACING: CPT

## 2024-11-26 PROCEDURE — 99285 EMERGENCY DEPT VISIT HI MDM: CPT

## 2024-11-26 PROCEDURE — 97165 OT EVAL LOW COMPLEX 30 MIN: CPT | Mod: GO

## 2024-11-26 PROCEDURE — 99284 EMERGENCY DEPT VISIT MOD MDM: CPT

## 2024-11-26 PROCEDURE — 87635 SARS-COV-2 COVID-19 AMP PRB: CPT

## 2024-11-26 PROCEDURE — 96116 NUBHVL XM PHYS/QHP 1ST HR: CPT

## 2024-11-26 PROCEDURE — 97161 PT EVAL LOW COMPLEX 20 MIN: CPT | Mod: GP

## 2024-11-26 PROCEDURE — 97530 THERAPEUTIC ACTIVITIES: CPT | Mod: GO

## 2024-11-26 PROCEDURE — 83880 ASSAY OF NATRIURETIC PEPTIDE: CPT

## 2024-11-26 PROCEDURE — 36415 COLL VENOUS BLD VENIPUNCTURE: CPT

## 2024-11-26 PROCEDURE — 84484 ASSAY OF TROPONIN QUANT: CPT

## 2024-11-26 PROCEDURE — 97535 SELF CARE MNGMENT TRAINING: CPT | Mod: GO

## 2024-11-26 PROCEDURE — 82553 CREATINE MB FRACTION: CPT

## 2024-11-26 PROCEDURE — 74177 CT ABD & PELVIS W/CONTRAST: CPT | Mod: MC

## 2024-11-26 PROCEDURE — 73590 X-RAY EXAM OF LOWER LEG: CPT | Mod: 26,RT

## 2024-11-26 PROCEDURE — 73130 X-RAY EXAM OF HAND: CPT

## 2024-11-26 PROCEDURE — 93306 TTE W/DOPPLER COMPLETE: CPT | Mod: 26

## 2024-11-26 PROCEDURE — 82550 ASSAY OF CK (CPK): CPT

## 2024-11-26 PROCEDURE — 80053 COMPREHEN METABOLIC PANEL: CPT

## 2024-11-26 PROCEDURE — 97110 THERAPEUTIC EXERCISES: CPT | Mod: GP

## 2024-11-26 PROCEDURE — 87040 BLOOD CULTURE FOR BACTERIA: CPT

## 2024-11-26 PROCEDURE — 73130 X-RAY EXAM OF HAND: CPT | Mod: 26,RT

## 2024-11-26 PROCEDURE — 93971 EXTREMITY STUDY: CPT

## 2024-11-26 RX ORDER — ONDANSETRON HYDROCHLORIDE 4 MG/1
4 TABLET, FILM COATED ORAL EVERY 6 HOURS
Refills: 0 | Status: DISCONTINUED | OUTPATIENT
Start: 2024-11-26 | End: 2024-12-03

## 2024-11-26 RX ORDER — FAMOTIDINE 20 MG/1
20 TABLET, FILM COATED ORAL DAILY
Refills: 0 | Status: DISCONTINUED | OUTPATIENT
Start: 2024-11-26 | End: 2024-12-03

## 2024-11-26 RX ORDER — BENZONATATE 100 MG/1
100 CAPSULE ORAL THREE TIMES A DAY
Refills: 0 | Status: DISCONTINUED | OUTPATIENT
Start: 2024-11-26 | End: 2024-12-03

## 2024-11-26 RX ORDER — SENNOSIDES 8.6 MG
2 TABLET ORAL AT BEDTIME
Refills: 0 | Status: DISCONTINUED | OUTPATIENT
Start: 2024-11-26 | End: 2024-12-03

## 2024-11-26 RX ORDER — SIMETHICONE 125 MG
80 CAPSULE ORAL THREE TIMES A DAY
Refills: 0 | Status: DISCONTINUED | OUTPATIENT
Start: 2024-11-26 | End: 2024-12-03

## 2024-11-26 RX ORDER — LIDOCAINE 40 MG/G
1 CREAM TOPICAL EVERY 24 HOURS
Refills: 0 | Status: DISCONTINUED | OUTPATIENT
Start: 2024-11-26 | End: 2024-12-04

## 2024-11-26 RX ORDER — SODIUM CHLORIDE 9 MG/ML
1000 INJECTION, SOLUTION INTRAMUSCULAR; INTRAVENOUS; SUBCUTANEOUS
Refills: 0 | Status: DISCONTINUED | OUTPATIENT
Start: 2024-11-26 | End: 2024-11-28

## 2024-11-26 RX ORDER — PIPERACILLIN SODIUM AND TAZOBACTAM SODIUM 4; .5 G/20ML; G/20ML
3.38 INJECTION, POWDER, LYOPHILIZED, FOR SOLUTION INTRAVENOUS ONCE
Refills: 0 | Status: COMPLETED | OUTPATIENT
Start: 2024-11-26 | End: 2024-11-26

## 2024-11-26 RX ORDER — COLLAGENASE SANTYL 250 [ARB'U]/G
1 OINTMENT TOPICAL DAILY
Refills: 0 | Status: DISCONTINUED | OUTPATIENT
Start: 2024-11-26 | End: 2024-12-04

## 2024-11-26 RX ORDER — FAMOTIDINE 20 MG/1
1 TABLET, FILM COATED ORAL
Qty: 0 | Refills: 0 | DISCHARGE
Start: 2024-11-26

## 2024-11-26 RX ORDER — TAMSULOSIN HYDROCHLORIDE 0.4 MG/1
0.4 CAPSULE ORAL AT BEDTIME
Refills: 0 | Status: DISCONTINUED | OUTPATIENT
Start: 2024-11-26 | End: 2024-12-03

## 2024-11-26 RX ORDER — IBUPROFEN 200 MG
600 TABLET ORAL EVERY 6 HOURS
Refills: 0 | Status: DISCONTINUED | OUTPATIENT
Start: 2024-11-26 | End: 2024-11-27

## 2024-11-26 RX ORDER — POLYETHYLENE GLYCOL 3350 17 G/17G
17 POWDER, FOR SOLUTION ORAL DAILY
Refills: 0 | Status: DISCONTINUED | OUTPATIENT
Start: 2024-11-26 | End: 2024-12-03

## 2024-11-26 RX ORDER — ACETAMINOPHEN 500MG 500 MG/1
650 TABLET, COATED ORAL EVERY 6 HOURS
Refills: 0 | Status: DISCONTINUED | OUTPATIENT
Start: 2024-11-26 | End: 2024-11-27

## 2024-11-26 RX ORDER — OXYCODONE HYDROCHLORIDE 30 MG/1
1 TABLET ORAL
Qty: 0 | Refills: 0 | DISCHARGE
Start: 2024-11-26

## 2024-11-26 RX ORDER — CEPHALEXIN 500 MG
1 CAPSULE ORAL
Qty: 0 | Refills: 0 | DISCHARGE
Start: 2024-11-26

## 2024-11-26 RX ORDER — BACITRACIN ZINC 500 UNIT/G
1 OINTMENT (GRAM) TOPICAL
Qty: 0 | Refills: 0 | DISCHARGE
Start: 2024-11-26

## 2024-11-26 RX ORDER — AMLODIPINE BESYLATE 10 MG/1
5 TABLET ORAL DAILY
Refills: 0 | Status: DISCONTINUED | OUTPATIENT
Start: 2024-11-26 | End: 2024-12-03

## 2024-11-26 RX ORDER — 0.9 % SODIUM CHLORIDE 0.9 %
1000 INTRAVENOUS SOLUTION INTRAVENOUS ONCE
Refills: 0 | Status: COMPLETED | OUTPATIENT
Start: 2024-11-26 | End: 2024-11-26

## 2024-11-26 RX ORDER — VANCOMYCIN HCL 900 MCG/MG
1000 POWDER (GRAM) MISCELLANEOUS ONCE
Refills: 0 | Status: COMPLETED | OUTPATIENT
Start: 2024-11-26 | End: 2024-11-26

## 2024-11-26 RX ADMIN — OXYCODONE HYDROCHLORIDE 5 MILLIGRAM(S): 30 TABLET ORAL at 17:31

## 2024-11-26 RX ADMIN — FAMOTIDINE 20 MILLIGRAM(S): 20 TABLET, FILM COATED ORAL at 13:05

## 2024-11-26 RX ADMIN — Medication 500 MILLIGRAM(S): at 17:30

## 2024-11-26 RX ADMIN — Medication 500 MILLIGRAM(S): at 05:17

## 2024-11-26 RX ADMIN — Medication 15 MILLILITER(S): at 13:07

## 2024-11-26 RX ADMIN — ACETAMINOPHEN 500MG 650 MILLIGRAM(S): 500 TABLET, COATED ORAL at 13:06

## 2024-11-26 RX ADMIN — Medication 250 MILLIGRAM(S): at 21:15

## 2024-11-26 RX ADMIN — Medication 500 MILLIGRAM(S): at 00:33

## 2024-11-26 RX ADMIN — ACETAMINOPHEN 500MG 650 MILLIGRAM(S): 500 TABLET, COATED ORAL at 17:31

## 2024-11-26 RX ADMIN — OXYCODONE HYDROCHLORIDE 5 MILLIGRAM(S): 30 TABLET ORAL at 08:45

## 2024-11-26 RX ADMIN — Medication 1000 MILLILITER(S): at 20:24

## 2024-11-26 RX ADMIN — ACETAMINOPHEN 500MG 650 MILLIGRAM(S): 500 TABLET, COATED ORAL at 14:00

## 2024-11-26 RX ADMIN — LIDOCAINE 1 PATCH: 40 CREAM TOPICAL at 13:07

## 2024-11-26 RX ADMIN — OXYCODONE HYDROCHLORIDE 5 MILLIGRAM(S): 30 TABLET ORAL at 09:45

## 2024-11-26 RX ADMIN — ENOXAPARIN SODIUM 70 MILLIGRAM(S): 30 INJECTION SUBCUTANEOUS at 05:14

## 2024-11-26 RX ADMIN — ACETAMINOPHEN 500MG 650 MILLIGRAM(S): 500 TABLET, COATED ORAL at 21:12

## 2024-11-26 RX ADMIN — ENOXAPARIN SODIUM 70 MILLIGRAM(S): 30 INJECTION SUBCUTANEOUS at 17:31

## 2024-11-26 RX ADMIN — Medication 1 APPLICATION(S): at 05:15

## 2024-11-26 RX ADMIN — Medication 1000 MILLILITER(S): at 21:40

## 2024-11-26 RX ADMIN — AMLODIPINE BESYLATE 5 MILLIGRAM(S): 10 TABLET ORAL at 05:16

## 2024-11-26 RX ADMIN — PIPERACILLIN SODIUM AND TAZOBACTAM SODIUM 200 GRAM(S): 4; .5 INJECTION, POWDER, LYOPHILIZED, FOR SOLUTION INTRAVENOUS at 20:25

## 2024-11-26 RX ADMIN — Medication 500 MILLIGRAM(S): at 13:06

## 2024-11-26 NOTE — ED PROVIDER NOTE - CLINICAL SUMMARY MEDICAL DECISION MAKING FREE TEXT BOX
Patient with infected wound, no evidence of compartment syndrome (soft compartments, normal DP pulse, sensation intact). Transfer accepted by surgical service, seen by surgical resident and accepted to their service.  Will order labs, antibiotics, blood cultures.

## 2024-11-26 NOTE — DISCHARGE NOTE PROVIDER - NSDCCPGOAL_GEN_ALL_CORE_FT
Verified Results  (1) PRENATAL PANEL [id number] 11:19AM Negrito Browning Order Number: [id number]     Test Name Result Flag Reference   BILIRUBIN UA Negative  Negative   CLARITY Hazy     COLOR Yellow     KETONES UA Negative mg/dl  Negative   LEUKOCYTE ESTERASE UA Small A Negative   NITRITE UA Positive A Negative   BLOOD UA Negative  Negative, Trace-Intact   PH UA 6 5  4 5-8 0   PROTEIN UA Negative mg/dl  Negative   SPECIFIC GRAVITY UA 1 025  1 003-1 030   GLUCOSE UA Negative mg/dl  Negative   UROBILINOGEN UA 0 2 E U /dl  0 2, 1 0 E U /dl   BASOPHILS ABSOLUTE COUNT 0 01 Thousands/?L  0 00-0 10   EOSINOPHILS ABSOLUTE COUNT 0 07 Thousand/?L  0 00-0 61   LYMPHOCYTES ABSOLUTE COUNT 1 61 Thousands/?L  0 60-4 47   MONOCYTES ABSOLUTE COUNT 0 49 Thousand/?L  0 17-1 22   NEUTROPHILS ABSOLUTE COUNT 4 89 Thousands/?L  1 85-7 62   BASOPHILS RELATIVE PERCENT 0 %  0-1   EOSINOPHILS RELATIVE PERCENT 1 %  0-6   HEMATOCRIT 32 5 % L 34 8-46 1   HEMOGLOBIN 11 0 g/dL L 11 5-15 4   LYMPHOCYTES RELATIVE PERCENT 23 %  14-44   MCH 29 6 pg  26 8-34 3   MCHC 33 8 g/dL  31 4-37 4   MCV 87 fL  82-98   MONOCYTES RELATIVE PERCENT 7 %  4-12   nRBC AUTOMATED 0 /100 WBCs     PLATELET COUNT 865 Thousands/uL  149-390   MPV 10 0 fL  8 9-12 7   RBC COUNT 3 72 Million/uL L 3 81-5 12   RDW 13 5 %  11 6-15 1   NEUTROPHILS RELATIVE PERCENT 69 %  43-75   WBC COUNT 7 07 Thousand/uL  4 31-10 16   ABO GROUPING O     RH FACTOR Positive     ANTIBODY SCREEN Negative To get better and follow your care plan as instructed.

## 2024-11-26 NOTE — ED ADULT NURSE NOTE - CHIEF COMPLAINT QUOTE
pt sent from acute rehab unit at [place] for r/o compartment syndrome due to increased edema and ecchymosis to right lower extremity. + and = peripheral pulses. + 3 pitting edema to right lower extremity. a and o x3.
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
## 2024-11-26 NOTE — ED ADULT NURSE REASSESSMENT NOTE - NS ED NURSE REASSESS COMMENT FT1
report received from RN Yuko, pt axo3 with equal and unlabored resp awaiting cdu nurse for report and movement to holding room
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
## 2024-11-26 NOTE — DISCHARGE NOTE NURSING/CASE MANAGEMENT/SOCIAL WORK - NSDCPNPNLCN_GEN_ALL_CORE
12:24 PM  05/07/24     Discharge instructions given to Donal De La Cruz (name) with verbalization of understanding. Patient accompanied by patient.  Patient discharged with the following prescriptions      Medication List        START taking these medications      acetaminophen 500 MG tablet  Commonly known as: TYLENOL  Take 1 tablet by mouth every 6 hours as needed for Pain     meloxicam 7.5 MG tablet  Commonly known as: MOBIC  Take 1 tablet by mouth daily               Where to Get Your Medications        These medications were sent to [facility] [address] [place] [address] - P [phone] - F [phone]  [facility] [address]      Phone: [phone]   acetaminophen 500 MG tablet  meloxicam 7.5 MG tablet      . Patient discharged to Home (destination).      Kalyn Velasco RN    
B/L legs

## 2024-11-26 NOTE — DISCHARGE NOTE NURSING/CASE MANAGEMENT/SOCIAL WORK - FINANCIAL ASSISTANCE
[facility] provides services at a reduced cost to those who are determined to be eligible through [facility]’s financial assistance program. Information regarding [facility]’s financial assistance program can be found by going to [email or web address] or by calling [phone].

## 2024-11-26 NOTE — ED PROVIDER NOTE - HOW PATIENT ADDRESSED, PROFILE
[place]ac[place] Hydroxyzine Counseling: Patient advised that the medication is sedating and not to drive a car after taking this medication.  Patient informed of potential adverse effects including but not limited to dry mouth, urinary retention, and blurry vision.  The patient verbalized understanding of the proper use and possible adverse effects of hydroxyzine.  All of the patient's questions and concerns were addressed.
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
## 2024-11-26 NOTE — PROGRESS NOTE ADULT - REASON FOR ADMISSION
Critical polytrauma
Critical polytrauma
pelvic Fracture
Critical polytrauma

## 2024-11-26 NOTE — ED ADULT TRIAGE NOTE - CHIEF COMPLAINT QUOTE
pt sent from acute rehab unit at [facility] for r/o compartment syndrome due to increased edema and ecchymosis to right lower extremity. + and = peripheral pulses. + 3 pitting edema to right lower extremity. a and o x3.
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
## 2024-11-26 NOTE — CONSULT NOTE ADULT - SUBJECTIVE AND OBJECTIVE BOX
Patient with polytrauma found to have LLE CFV to EIV DVT appears subacute in nature. Also with partially imaged bilateral PE which are new from 11/15/24. Recommend therapeutic anticoagulation - He is on therapeutic lovenox. Also recommend echo, troponin and BNP to further assess for right heart strain from PE. I have communicated these recommendations to hospitalist team.

## 2024-11-26 NOTE — DISCHARGE NOTE PROVIDER - CARE PROVIDERS DIRECT ADDRESSES
,[email or web address]
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
## 2024-11-26 NOTE — CHART NOTE - NSCHARTNOTEFT_GEN_A_CORE
Called to bedside for pt with sudden-onset severe R-sided lower leg pain around site of a pre-existing abrasion. Pt received oxycodone 5mg PO around 4:30pm for this new pain with no effect. Son present at bedside. Pt states pain is localized and does not radiate, is 9/10 in severity, and is burning in quality. He also reports a headache. No blurry vision, double vision, new numbness or weakness. Advised pt to alert nurse if new symptoms such as these appeared.     On exam, there is dark bloody but dry abrasion on medial right lower leg surrounded by erythema. There is swelling from site of wound down to foot and area is warm as compared to rest of leg and compared to left side. Area is very tender to palpation.     Plan: Ordered one-time dose of Dilaudid 2mg PO to see if this helps pt's pain. Ordered Duplex to assess for DVT. Vitals WNL, but ordered blood culture as pt reports chills and site looks possibly infected. Ordered bacitracin for wound and Keflex 500mg PO QID to start after blood cultures are drawn. Alerted overnight resident as to pt status. Will follow up tomorrow with wound care nursing and will obtain XR of area if Doppler negative, though suspicion for fracture is low given no acute inciting events and given severity of erythema and location overlying wound.
To summarize last nights events -     When came on shift, was signed out to f/up RLE Venous Duplex for RLE pain. On that study, Rads noted that the contralateral common femoral vein is thrombosed as well as greater saphenous. Spoke with hospitalist Dr. Kirkpatrick, put patient on therapeutic Lovenox (and ordered percocet for pain). Reached out to Vascular on call, Dr. Vieyra, who rec dedicated LLE Duplex study + CTA-P Venogram.     Duplex:   FINDINGS: There is non-compressibility of the left common femoral, femoral and  popliteal veins.  Doppler examination shows no flow.  There is calf vein thrombosis detected in the posterior tibial and gastrocnemius veins.  IMPRESSION: Acute deep venous thrombosis: above and below the knee.    CT:   - Partially visualized lobar and segmental pulmonary thromboembolism  - Deep vein thrombosis involving the left external iliac, common femoral and superficial femoral veins. Partially opacified left popliteal vein-thrombosis versus incomplete  opacification.  - Recommend dedicated pulmonary CTA and lower extremity Doppler ultrasound.    Spoke with hospitalist - CTA not tenable w/ recent venogram contrast, must wait 24 hours to pass.    Throughout evening, patient w/ reassuring vitals (on room air), no respiratory distress, pain well-controlled.     Signing off to day team -

## 2024-11-26 NOTE — DISCHARGE NOTE NURSING/CASE MANAGEMENT/SOCIAL WORK - PATIENT PORTAL LINK FT
You can access the FollowMyHealth Patient Portal offered by  by registering at the following website: [email or web address]. By joining [facility]’s FollowMyHealth portal, you will also be able to view your health information using other applications (apps) compatible with our system.

## 2024-11-26 NOTE — DISCHARGE NOTE PROVIDER - PROVIDER TOKENS
PROVIDER:[TOKEN:[[id number]],FOLLOWUP:[2 months]],PROVIDER:[TOKEN:[[id number]],FOLLOWUP:[2 weeks]],PROVIDER:[TOKEN:[[id number]],FOLLOWUP:[2 weeks]],PROVIDER:[TOKEN:[[id number]],FOLLOWUP:[2 weeks]]

## 2024-11-26 NOTE — ED PROVIDER NOTE - PHYSICAL EXAMINATION
Gen: Well appearing in NAD  Head: NC/AT  Neck: trachea midline, c-collar in place  Resp:  No distress, CTAB  CV: RRR  GI: soft, NTND  Ext:  RLE approximately 5 cm black eschar to medial aspect with surrounding erythema/warmth/edema/tenderness, no crepitus, no drainage.  Soft compartments, normal DP pulses.  Neuro:  A&O appears non focal  Skin:  Warm and dry as visualized  Psych:  Normal affect and mood

## 2024-11-26 NOTE — ED PROVIDER NOTE - OBJECTIVE STATEMENT
69-year-old male history of hypertension, recent polytrauma (L pelvic fractures including acetabulum, ileum, pubic body and inferior pubic rami, dehiscence of R lamina papyracea, C5-C7 compression fractures, R 3rd proximal phalynx fx, hematoma in space of lashon and T2-T4 superior endplate compression fracture), [facility], has had wound for weeks, erythema and pain worsening over the last 1 to 2 days.  Fever yesterday.  Denies purulent drainage.
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
## 2024-11-26 NOTE — PROGRESS NOTE ADULT - NUTRITIONAL ASSESSMENT
This patient has been assessed with a concern for Malnutrition and has been determined to have a diagnosis/diagnoses of Moderate protein-calorie malnutrition.    This patient is being managed with:   Diet Regular-  Supplement Feeding Modality:  Oral  Ensure Clear Cans or Servings Per Day:  1       Frequency:  Daily  Entered: Nov 25 2024 11:10AM  
This patient has been assessed with a concern for Malnutrition and has been determined to have a diagnosis/diagnoses of Moderate protein-calorie malnutrition.    This patient is being managed with:   Diet Regular-  Supplement Feeding Modality:  Oral  Ensure Clear Cans or Servings Per Day:  1       Frequency:  Daily  Entered: Nov 25 2024 11:10AM

## 2024-11-26 NOTE — DISCHARGE NOTE PROVIDER - CARE PROVIDER_API CALL
Andrew Vail  Physical/Rehab Medicine  [address] [place] [address]  Phone: [phone]  Fax: [phone]  Follow Up Time: 2 months    Grayson Mckeon  Neurosurgery  [address] [place] [id number]  Phone: [phone]  Fax: [phone]  Follow Up Time: 2 weeks    Mehdi Munoz  Interventional Neuroradiology  [address] [place] [address]  Phone: [phone]  Fax: [phone]  Follow Up Time: 2 weeks    Jass Leung  Orthopaedic Surgery  [address] [place] [address]  Phone: [phone]  Fax: [phone]  Follow Up Time: 2 weeks

## 2024-11-26 NOTE — ED ADULT NURSE NOTE - OBJECTIVE STATEMENT
Pt axo4. Respirations even and unlabored. Presents to ed from [facility] for worsening in RLE wound. Wound has been presents for a few weeks. Last 2,3 days redness and pain worsening. + pulses, +2 edema. States he had a fever yesterday. Was recently treated for PE/DVT, polytrama in C collar.

## 2024-11-27 DIAGNOSIS — T14.8XXA OTHER INJURY OF UNSPECIFIED BODY REGION, INITIAL ENCOUNTER: ICD-10-CM

## 2024-11-27 LAB
ALBUMIN SERPL ELPH-MCNC: 3.4 G/DL — SIGNIFICANT CHANGE UP (ref 3.3–5.2)
ALP SERPL-CCNC: 106 U/L — SIGNIFICANT CHANGE UP (ref 40–120)
ALT FLD-CCNC: 17 U/L — SIGNIFICANT CHANGE UP
ANION GAP SERPL CALC-SCNC: 11 MMOL/L — SIGNIFICANT CHANGE UP (ref 5–17)
ANION GAP SERPL CALC-SCNC: 14 MMOL/L — SIGNIFICANT CHANGE UP (ref 5–17)
ANION GAP SERPL CALC-SCNC: 14 MMOL/L — SIGNIFICANT CHANGE UP (ref 5–17)
APTT BLD: 30.1 SEC — SIGNIFICANT CHANGE UP (ref 24.5–35.6)
APTT BLD: 46.6 SEC — HIGH (ref 24.5–35.6)
AST SERPL-CCNC: 21 U/L — SIGNIFICANT CHANGE UP
BASOPHILS # BLD AUTO: 0.04 K/UL — SIGNIFICANT CHANGE UP (ref 0–0.2)
BASOPHILS # BLD AUTO: 0.05 K/UL — SIGNIFICANT CHANGE UP (ref 0–0.2)
BASOPHILS NFR BLD AUTO: 0.5 % — SIGNIFICANT CHANGE UP (ref 0–2)
BASOPHILS NFR BLD AUTO: 0.5 % — SIGNIFICANT CHANGE UP (ref 0–2)
BILIRUB SERPL-MCNC: 1.9 MG/DL — SIGNIFICANT CHANGE UP (ref 0.4–2)
BUN SERPL-MCNC: 14.8 MG/DL — SIGNIFICANT CHANGE UP (ref 8–20)
BUN SERPL-MCNC: 18.4 MG/DL — SIGNIFICANT CHANGE UP (ref 8–20)
BUN SERPL-MCNC: 18.5 MG/DL — SIGNIFICANT CHANGE UP (ref 8–20)
CALCIUM SERPL-MCNC: 7.7 MG/DL — LOW (ref 8.4–10.5)
CALCIUM SERPL-MCNC: 8.4 MG/DL — SIGNIFICANT CHANGE UP (ref 8.4–10.5)
CALCIUM SERPL-MCNC: 8.5 MG/DL — SIGNIFICANT CHANGE UP (ref 8.4–10.5)
CHLORIDE SERPL-SCNC: 100 MMOL/L — SIGNIFICANT CHANGE UP (ref 96–108)
CHLORIDE SERPL-SCNC: 97 MMOL/L — SIGNIFICANT CHANGE UP (ref 96–108)
CHLORIDE SERPL-SCNC: 98 MMOL/L — SIGNIFICANT CHANGE UP (ref 96–108)
CO2 SERPL-SCNC: 20 MMOL/L — LOW (ref 22–29)
CO2 SERPL-SCNC: 21 MMOL/L — LOW (ref 22–29)
CO2 SERPL-SCNC: 21 MMOL/L — LOW (ref 22–29)
CREAT SERPL-MCNC: 0.56 MG/DL — SIGNIFICANT CHANGE UP (ref 0.5–1.3)
CREAT SERPL-MCNC: 0.57 MG/DL — SIGNIFICANT CHANGE UP (ref 0.5–1.3)
CREAT SERPL-MCNC: 0.64 MG/DL — SIGNIFICANT CHANGE UP (ref 0.5–1.3)
EGFR: 103 ML/MIN/1.73M2 — SIGNIFICANT CHANGE UP
EGFR: 107 ML/MIN/1.73M2 — SIGNIFICANT CHANGE UP
EGFR: 107 ML/MIN/1.73M2 — SIGNIFICANT CHANGE UP
EOSINOPHIL # BLD AUTO: 0.26 K/UL — SIGNIFICANT CHANGE UP (ref 0–0.5)
EOSINOPHIL # BLD AUTO: 0.31 K/UL — SIGNIFICANT CHANGE UP (ref 0–0.5)
EOSINOPHIL NFR BLD AUTO: 2.4 % — SIGNIFICANT CHANGE UP (ref 0–6)
EOSINOPHIL NFR BLD AUTO: 4.1 % — SIGNIFICANT CHANGE UP (ref 0–6)
GLUCOSE SERPL-MCNC: 108 MG/DL — HIGH (ref 70–99)
GLUCOSE SERPL-MCNC: 114 MG/DL — HIGH (ref 70–99)
GLUCOSE SERPL-MCNC: 94 MG/DL — SIGNIFICANT CHANGE UP (ref 70–99)
HCT VFR BLD CALC: 25.6 % — LOW (ref 39–50)
HCT VFR BLD CALC: 29.4 % — LOW (ref 39–50)
HCT VFR BLD CALC: 31.2 % — LOW (ref 39–50)
HGB BLD-MCNC: 10.4 G/DL — LOW (ref 13–17)
HGB BLD-MCNC: 8.5 G/DL — LOW (ref 13–17)
HGB BLD-MCNC: 9.8 G/DL — LOW (ref 13–17)
IMM GRANULOCYTES NFR BLD AUTO: 1.7 % — HIGH (ref 0–0.9)
IMM GRANULOCYTES NFR BLD AUTO: 1.9 % — HIGH (ref 0–0.9)
INR BLD: 1.17 RATIO — HIGH (ref 0.85–1.16)
LACTATE SERPL-SCNC: 1 MMOL/L — SIGNIFICANT CHANGE UP (ref 0.5–2)
LACTATE SERPL-SCNC: 1 MMOL/L — SIGNIFICANT CHANGE UP (ref 0.5–2)
LYMPHOCYTES # BLD AUTO: 1.16 K/UL — SIGNIFICANT CHANGE UP (ref 1–3.3)
LYMPHOCYTES # BLD AUTO: 1.39 K/UL — SIGNIFICANT CHANGE UP (ref 1–3.3)
LYMPHOCYTES # BLD AUTO: 12.8 % — LOW (ref 13–44)
LYMPHOCYTES # BLD AUTO: 15.4 % — SIGNIFICANT CHANGE UP (ref 13–44)
MAGNESIUM SERPL-MCNC: 1.8 MG/DL — SIGNIFICANT CHANGE UP (ref 1.6–2.6)
MAGNESIUM SERPL-MCNC: 1.9 MG/DL — SIGNIFICANT CHANGE UP (ref 1.6–2.6)
MCHC RBC-ENTMCNC: 30.1 PG — SIGNIFICANT CHANGE UP (ref 27–34)
MCHC RBC-ENTMCNC: 30.4 PG — SIGNIFICANT CHANGE UP (ref 27–34)
MCHC RBC-ENTMCNC: 30.4 PG — SIGNIFICANT CHANGE UP (ref 27–34)
MCHC RBC-ENTMCNC: 33.2 G/DL — SIGNIFICANT CHANGE UP (ref 32–36)
MCHC RBC-ENTMCNC: 33.3 G/DL — SIGNIFICANT CHANGE UP (ref 32–36)
MCHC RBC-ENTMCNC: 33.3 G/DL — SIGNIFICANT CHANGE UP (ref 32–36)
MCV RBC AUTO: 90.2 FL — SIGNIFICANT CHANGE UP (ref 80–100)
MCV RBC AUTO: 91.3 FL — SIGNIFICANT CHANGE UP (ref 80–100)
MCV RBC AUTO: 91.4 FL — SIGNIFICANT CHANGE UP (ref 80–100)
MONOCYTES # BLD AUTO: 0.74 K/UL — SIGNIFICANT CHANGE UP (ref 0–0.9)
MONOCYTES # BLD AUTO: 0.95 K/UL — HIGH (ref 0–0.9)
MONOCYTES NFR BLD AUTO: 8.7 % — SIGNIFICANT CHANGE UP (ref 2–14)
MONOCYTES NFR BLD AUTO: 9.8 % — SIGNIFICANT CHANGE UP (ref 2–14)
NEUTROPHILS # BLD AUTO: 5.13 K/UL — SIGNIFICANT CHANGE UP (ref 1.8–7.4)
NEUTROPHILS # BLD AUTO: 8.02 K/UL — HIGH (ref 1.8–7.4)
NEUTROPHILS NFR BLD AUTO: 68.3 % — SIGNIFICANT CHANGE UP (ref 43–77)
NEUTROPHILS NFR BLD AUTO: 73.9 % — SIGNIFICANT CHANGE UP (ref 43–77)
PHOSPHATE SERPL-MCNC: 3 MG/DL — SIGNIFICANT CHANGE UP (ref 2.4–4.7)
PHOSPHATE SERPL-MCNC: 3.6 MG/DL — SIGNIFICANT CHANGE UP (ref 2.4–4.7)
PLATELET # BLD AUTO: 168 K/UL — SIGNIFICANT CHANGE UP (ref 150–400)
PLATELET # BLD AUTO: 175 K/UL — SIGNIFICANT CHANGE UP (ref 150–400)
PLATELET # BLD AUTO: 184 K/UL — SIGNIFICANT CHANGE UP (ref 150–400)
POTASSIUM SERPL-MCNC: 3.5 MMOL/L — SIGNIFICANT CHANGE UP (ref 3.5–5.3)
POTASSIUM SERPL-MCNC: 3.6 MMOL/L — SIGNIFICANT CHANGE UP (ref 3.5–5.3)
POTASSIUM SERPL-MCNC: 3.8 MMOL/L — SIGNIFICANT CHANGE UP (ref 3.5–5.3)
POTASSIUM SERPL-SCNC: 3.5 MMOL/L — SIGNIFICANT CHANGE UP (ref 3.5–5.3)
POTASSIUM SERPL-SCNC: 3.6 MMOL/L — SIGNIFICANT CHANGE UP (ref 3.5–5.3)
POTASSIUM SERPL-SCNC: 3.8 MMOL/L — SIGNIFICANT CHANGE UP (ref 3.5–5.3)
PROT SERPL-MCNC: 6.3 G/DL — LOW (ref 6.6–8.7)
PROTHROM AB SERPL-ACNC: 13.5 SEC — HIGH (ref 9.9–13.4)
RBC # BLD: 2.8 M/UL — LOW (ref 4.2–5.8)
RBC # BLD: 3.22 M/UL — LOW (ref 4.2–5.8)
RBC # BLD: 3.46 M/UL — LOW (ref 4.2–5.8)
RBC # FLD: 14.3 % — SIGNIFICANT CHANGE UP (ref 10.3–14.5)
RBC # FLD: 14.4 % — SIGNIFICANT CHANGE UP (ref 10.3–14.5)
RBC # FLD: 14.5 % — SIGNIFICANT CHANGE UP (ref 10.3–14.5)
SODIUM SERPL-SCNC: 131 MMOL/L — LOW (ref 135–145)
SODIUM SERPL-SCNC: 132 MMOL/L — LOW (ref 135–145)
SODIUM SERPL-SCNC: 132 MMOL/L — LOW (ref 135–145)
WBC # BLD: 10.86 K/UL — HIGH (ref 3.8–10.5)
WBC # BLD: 7.52 K/UL — SIGNIFICANT CHANGE UP (ref 3.8–10.5)
WBC # BLD: 9.99 K/UL — SIGNIFICANT CHANGE UP (ref 3.8–10.5)
WBC # FLD AUTO: 10.86 K/UL — HIGH (ref 3.8–10.5)
WBC # FLD AUTO: 7.52 K/UL — SIGNIFICANT CHANGE UP (ref 3.8–10.5)
WBC # FLD AUTO: 9.99 K/UL — SIGNIFICANT CHANGE UP (ref 3.8–10.5)

## 2024-11-27 PROCEDURE — 11004 DBRDMT SKIN XTRNL GENT&PER: CPT

## 2024-11-27 PROCEDURE — 71045 X-RAY EXAM CHEST 1 VIEW: CPT | Mod: 26

## 2024-11-27 PROCEDURE — 93010 ELECTROCARDIOGRAM REPORT: CPT

## 2024-11-27 PROCEDURE — 99232 SBSQ HOSP IP/OBS MODERATE 35: CPT | Mod: 25

## 2024-11-27 PROCEDURE — 73702 CT LWR EXTREMITY W/O&W/DYE: CPT | Mod: 26,RT

## 2024-11-27 RX ORDER — HEPARIN SODIUM,PORCINE 1000/ML
VIAL (ML) INJECTION
Qty: 25000 | Refills: 0 | Status: DISCONTINUED | OUTPATIENT
Start: 2024-11-27 | End: 2024-11-29

## 2024-11-27 RX ORDER — CLINDAMYCIN HYDROCHLORIDE 300 MG/1
900 CAPSULE ORAL EVERY 8 HOURS
Refills: 0 | Status: DISCONTINUED | OUTPATIENT
Start: 2024-11-27 | End: 2024-11-28

## 2024-11-27 RX ORDER — HYDROMORPHONE HYDROCHLORIDE 2 MG/1
0.5 TABLET ORAL ONCE
Refills: 0 | Status: DISCONTINUED | OUTPATIENT
Start: 2024-11-27 | End: 2024-11-27

## 2024-11-27 RX ORDER — PIPERACILLIN SODIUM AND TAZOBACTAM SODIUM 4; .5 G/20ML; G/20ML
3.38 INJECTION, POWDER, LYOPHILIZED, FOR SOLUTION INTRAVENOUS EVERY 8 HOURS
Refills: 0 | Status: DISCONTINUED | OUTPATIENT
Start: 2024-11-27 | End: 2024-12-02

## 2024-11-27 RX ORDER — POTASSIUM CHLORIDE 600 MG/1
20 TABLET, EXTENDED RELEASE ORAL ONCE
Refills: 0 | Status: COMPLETED | OUTPATIENT
Start: 2024-11-27 | End: 2024-11-27

## 2024-11-27 RX ORDER — CLINDAMYCIN HYDROCHLORIDE 300 MG/1
CAPSULE ORAL
Refills: 0 | Status: DISCONTINUED | OUTPATIENT
Start: 2024-11-27 | End: 2024-11-28

## 2024-11-27 RX ORDER — HEPARIN SODIUM,PORCINE 1000/ML
VIAL (ML) INJECTION
Qty: 25000 | Refills: 0 | Status: DISCONTINUED | OUTPATIENT
Start: 2024-11-27 | End: 2024-11-27

## 2024-11-27 RX ORDER — OXYCODONE HYDROCHLORIDE 30 MG/1
10 TABLET ORAL EVERY 6 HOURS
Refills: 0 | Status: DISCONTINUED | OUTPATIENT
Start: 2024-11-27 | End: 2024-11-27

## 2024-11-27 RX ORDER — OXYCODONE HYDROCHLORIDE 30 MG/1
5 TABLET ORAL EVERY 4 HOURS
Refills: 0 | Status: DISCONTINUED | OUTPATIENT
Start: 2024-11-27 | End: 2024-12-03

## 2024-11-27 RX ORDER — CLINDAMYCIN HYDROCHLORIDE 300 MG/1
900 CAPSULE ORAL ONCE
Refills: 0 | Status: COMPLETED | OUTPATIENT
Start: 2024-11-27 | End: 2024-11-27

## 2024-11-27 RX ORDER — LIDOCAINE HYDROCHLORIDE,EPINEPHRINE BITARTRATE 20; .02 MG/ML; MG/ML
20 INJECTION, SOLUTION DENTAL; INFILTRATION ONCE
Refills: 0 | Status: DISCONTINUED | OUTPATIENT
Start: 2024-11-27 | End: 2024-12-04

## 2024-11-27 RX ORDER — KETOROLAC TROMETHAMINE 30 MG/ML
15 INJECTION INTRAMUSCULAR; INTRAVENOUS ONCE
Refills: 0 | Status: DISCONTINUED | OUTPATIENT
Start: 2024-11-27 | End: 2024-11-27

## 2024-11-27 RX ORDER — VANCOMYCIN HCL 900 MCG/MG
1250 POWDER (GRAM) MISCELLANEOUS EVERY 12 HOURS
Refills: 0 | Status: DISCONTINUED | OUTPATIENT
Start: 2024-11-28 | End: 2024-12-02

## 2024-11-27 RX ORDER — METHOCARBAMOL 500 MG/1
750 TABLET, FILM COATED ORAL EVERY 6 HOURS
Refills: 0 | Status: DISCONTINUED | OUTPATIENT
Start: 2024-11-28 | End: 2024-12-03

## 2024-11-27 RX ORDER — KETOROLAC TROMETHAMINE 30 MG/ML
15 INJECTION INTRAMUSCULAR; INTRAVENOUS EVERY 6 HOURS
Refills: 0 | Status: DISCONTINUED | OUTPATIENT
Start: 2024-11-27 | End: 2024-11-27

## 2024-11-27 RX ORDER — SODIUM CHLORIDE 9 MG/ML
1000 INJECTION, SOLUTION INTRAMUSCULAR; INTRAVENOUS; SUBCUTANEOUS ONCE
Refills: 0 | Status: COMPLETED | OUTPATIENT
Start: 2024-11-27 | End: 2024-11-27

## 2024-11-27 RX ORDER — KETOROLAC TROMETHAMINE 30 MG/ML
15 INJECTION INTRAMUSCULAR; INTRAVENOUS EVERY 6 HOURS
Refills: 0 | Status: DISCONTINUED | OUTPATIENT
Start: 2024-11-28 | End: 2024-12-03

## 2024-11-27 RX ORDER — METHOCARBAMOL 500 MG/1
750 TABLET, FILM COATED ORAL ONCE
Refills: 0 | Status: COMPLETED | OUTPATIENT
Start: 2024-11-27 | End: 2024-11-27

## 2024-11-27 RX ORDER — ACETAMINOPHEN 500MG 500 MG/1
975 TABLET, COATED ORAL EVERY 6 HOURS
Refills: 0 | Status: DISCONTINUED | OUTPATIENT
Start: 2024-11-27 | End: 2024-12-03

## 2024-11-27 RX ORDER — VANCOMYCIN HCL 900 MCG/MG
750 POWDER (GRAM) MISCELLANEOUS EVERY 12 HOURS
Refills: 0 | Status: DISCONTINUED | OUTPATIENT
Start: 2024-11-27 | End: 2024-11-27

## 2024-11-27 RX ORDER — INFLUENZA VIRUS VACCINE 15; 15; 15; 15 UG/.5ML; UG/.5ML; UG/.5ML; UG/.5ML
0.5 SUSPENSION INTRAMUSCULAR ONCE
Refills: 0 | Status: DISCONTINUED | OUTPATIENT
Start: 2024-11-27 | End: 2024-12-04

## 2024-11-27 RX ORDER — VANCOMYCIN HCL 900 MCG/MG
1250 POWDER (GRAM) MISCELLANEOUS ONCE
Refills: 0 | Status: COMPLETED | OUTPATIENT
Start: 2024-11-27 | End: 2024-11-27

## 2024-11-27 RX ORDER — OXYCODONE HYDROCHLORIDE 30 MG/1
5 TABLET ORAL EVERY 6 HOURS
Refills: 0 | Status: DISCONTINUED | OUTPATIENT
Start: 2024-11-27 | End: 2024-11-27

## 2024-11-27 RX ORDER — OXYCODONE HYDROCHLORIDE 30 MG/1
10 TABLET ORAL EVERY 4 HOURS
Refills: 0 | Status: DISCONTINUED | OUTPATIENT
Start: 2024-11-27 | End: 2024-12-03

## 2024-11-27 RX ADMIN — LIDOCAINE 1 PATCH: 40 CREAM TOPICAL at 22:19

## 2024-11-27 RX ADMIN — SODIUM CHLORIDE 120 MILLILITER(S): 9 INJECTION, SOLUTION INTRAMUSCULAR; INTRAVENOUS; SUBCUTANEOUS at 08:21

## 2024-11-27 RX ADMIN — Medication 1300 UNIT(S)/HR: at 13:59

## 2024-11-27 RX ADMIN — HYDROMORPHONE HYDROCHLORIDE 0.5 MILLIGRAM(S): 2 TABLET ORAL at 13:10

## 2024-11-27 RX ADMIN — ACETAMINOPHEN 500MG 975 MILLIGRAM(S): 500 TABLET, COATED ORAL at 16:26

## 2024-11-27 RX ADMIN — BENZONATATE 100 MILLIGRAM(S): 100 CAPSULE ORAL at 22:20

## 2024-11-27 RX ADMIN — AMLODIPINE BESYLATE 5 MILLIGRAM(S): 10 TABLET ORAL at 05:26

## 2024-11-27 RX ADMIN — Medication 166.67 MILLIGRAM(S): at 11:55

## 2024-11-27 RX ADMIN — HYDROMORPHONE HYDROCHLORIDE 0.5 MILLIGRAM(S): 2 TABLET ORAL at 23:56

## 2024-11-27 RX ADMIN — KETOROLAC TROMETHAMINE 15 MILLIGRAM(S): 30 INJECTION INTRAMUSCULAR; INTRAVENOUS at 23:56

## 2024-11-27 RX ADMIN — BENZONATATE 100 MILLIGRAM(S): 100 CAPSULE ORAL at 13:59

## 2024-11-27 RX ADMIN — POTASSIUM CHLORIDE 50 MILLIEQUIVALENT(S): 600 TABLET, EXTENDED RELEASE ORAL at 11:55

## 2024-11-27 RX ADMIN — METHOCARBAMOL 750 MILLIGRAM(S): 500 TABLET, FILM COATED ORAL at 23:56

## 2024-11-27 RX ADMIN — FAMOTIDINE 20 MILLIGRAM(S): 20 TABLET, FILM COATED ORAL at 08:22

## 2024-11-27 RX ADMIN — SODIUM CHLORIDE 120 MILLILITER(S): 9 INJECTION, SOLUTION INTRAMUSCULAR; INTRAVENOUS; SUBCUTANEOUS at 05:27

## 2024-11-27 RX ADMIN — Medication 2 TABLET(S): at 22:20

## 2024-11-27 RX ADMIN — PIPERACILLIN SODIUM AND TAZOBACTAM SODIUM 25 GRAM(S): 4; .5 INJECTION, POWDER, LYOPHILIZED, FOR SOLUTION INTRAVENOUS at 22:20

## 2024-11-27 RX ADMIN — BENZONATATE 100 MILLIGRAM(S): 100 CAPSULE ORAL at 05:26

## 2024-11-27 RX ADMIN — Medication 600 MILLIGRAM(S): at 09:28

## 2024-11-27 RX ADMIN — TAMSULOSIN HYDROCHLORIDE 0.4 MILLIGRAM(S): 0.4 CAPSULE ORAL at 22:20

## 2024-11-27 RX ADMIN — OXYCODONE HYDROCHLORIDE 10 MILLIGRAM(S): 30 TABLET ORAL at 22:41

## 2024-11-27 RX ADMIN — Medication 600 MILLIGRAM(S): at 08:28

## 2024-11-27 RX ADMIN — Medication 1500 UNIT(S)/HR: at 21:52

## 2024-11-27 RX ADMIN — COLLAGENASE SANTYL 1 APPLICATION(S): 250 OINTMENT TOPICAL at 08:21

## 2024-11-27 RX ADMIN — OXYCODONE HYDROCHLORIDE 10 MILLIGRAM(S): 30 TABLET ORAL at 17:26

## 2024-11-27 RX ADMIN — HYDROMORPHONE HYDROCHLORIDE 0.5 MILLIGRAM(S): 2 TABLET ORAL at 12:31

## 2024-11-27 RX ADMIN — OXYCODONE HYDROCHLORIDE 10 MILLIGRAM(S): 30 TABLET ORAL at 16:26

## 2024-11-27 RX ADMIN — OXYCODONE HYDROCHLORIDE 10 MILLIGRAM(S): 30 TABLET ORAL at 23:41

## 2024-11-27 RX ADMIN — PIPERACILLIN SODIUM AND TAZOBACTAM SODIUM 25 GRAM(S): 4; .5 INJECTION, POWDER, LYOPHILIZED, FOR SOLUTION INTRAVENOUS at 13:58

## 2024-11-27 RX ADMIN — SODIUM CHLORIDE 1000 MILLILITER(S): 9 INJECTION, SOLUTION INTRAMUSCULAR; INTRAVENOUS; SUBCUTANEOUS at 11:06

## 2024-11-27 RX ADMIN — CLINDAMYCIN HYDROCHLORIDE 100 MILLIGRAM(S): 300 CAPSULE ORAL at 11:06

## 2024-11-27 RX ADMIN — CLINDAMYCIN HYDROCHLORIDE 100 MILLIGRAM(S): 300 CAPSULE ORAL at 22:21
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
## 2024-11-27 NOTE — PROGRESS NOTE ADULT - SUBJECTIVE AND OBJECTIVE BOX
INTERVAL HPI/OVERNIGHT EVENTS:    Patient evaluated at bedside. Reports significant pain in lower R shin, no complaints aside from this at the current time.    MEDICATIONS  (STANDING):  amLODIPine   Tablet 5 milliGRAM(s) Oral daily  benzonatate 100 milliGRAM(s) Oral three times a day  clindamycin IVPB      clindamycin IVPB 900 milliGRAM(s) IV Intermittent every 8 hours  collagenase Ointment 1 Application(s) Topical daily  famotidine    Tablet 20 milliGRAM(s) Oral daily  heparin  Infusion.  Unit(s)/Hr (13 mL/Hr) IV Continuous <Continuous>  influenza  Vaccine (HIGH DOSE) 0.5 milliLiter(s) IntraMuscular once  lidocaine   4% Patch 1 Patch Transdermal every 24 hours  lidocaine 1%/epinephrine 1:100,000 Inj 20 milliLiter(s) Local Injection once  piperacillin/tazobactam IVPB.. 3.375 Gram(s) IV Intermittent every 8 hours  senna 2 Tablet(s) Oral at bedtime  sodium chloride 0.9%. 1000 milliLiter(s) (120 mL/Hr) IV Continuous <Continuous>  tamsulosin 0.4 milliGRAM(s) Oral at bedtime    MEDICATIONS  (PRN):  ondansetron Injectable 4 milliGRAM(s) IV Push every 6 hours PRN Nausea  polyethylene glycol 3350 17 Gram(s) Oral daily PRN Constipation  simethicone 80 milliGRAM(s) Chew three times a day PRN Gas      Vital Signs Last 24 Hrs  T(C): 36.6 (27 Nov 2024 11:31), Max: 37.7 (26 Nov 2024 19:03)  T(F): 97.8 (27 Nov 2024 11:31), Max: 99.8 (26 Nov 2024 19:03)  HR: 84 (27 Nov 2024 11:31) (84 - 114)  BP: 93/62 (27 Nov 2024 11:31) (93/62 - 128/67)  BP(mean): 72 (27 Nov 2024 11:31) (72 - 77)  RR: 18 (27 Nov 2024 11:31) (16 - 20)  SpO2: 97% (27 Nov 2024 11:31) (92% - 100%)    Parameters below as of 27 Nov 2024 11:31  Patient On (Oxygen Delivery Method): room air        PHYSICAL EXAM:  GENERAL: NAD, well-groomed, well-developed  HEAD:  Bruising and hematoma over left forehead  EYES: EOMI, PERRLA, conjunctiva and sclera clear  NECK: Supple, No JVD  CHEST/LUNG: non-labored breathing on room air.   HEART: Regular rate and rhythm  ABDOMEN: Soft, Nontender, Nondistended  VASCULAR: Normal pulses, Normal capillary refill  EXTREMITIES:  2+ Peripheral Pulses, scattered healing abrasions and ecchymosis   - Right lower shin wound with eschar over 8x2cm defect  - Surrounding erythema with <2mm bullae noted around skin  - Painful to palpation  SKIN: Warm, Intact      I&O's Detail      LABS:                        9.8    9.99  )-----------( 175      ( 27 Nov 2024 10:59 )             29.4     11-27    132[L]  |  98  |  18.5  ----------------------------<  94  3.6   |  20.0[L]  |  0.57    Ca    8.4      27 Nov 2024 10:59  Phos  3.6     11-27  Mg     1.9     11-27    TPro  6.3[L]  /  Alb  3.4  /  TBili  1.9  /  DBili  x   /  AST  21  /  ALT  17  /  AlkPhos  106  11-27    PT/INR - ( 27 Nov 2024 10:59 )   PT: 13.5 sec;   INR: 1.17 ratio         PTT - ( 27 Nov 2024 10:59 )  PTT:30.1 sec  Urinalysis Basic - ( 27 Nov 2024 10:59 )    Color: x / Appearance: x / SG: x / pH: x  Gluc: 94 mg/dL / Ketone: x  / Bili: x / Urobili: x   Blood: x / Protein: x / Nitrite: x   Leuk Esterase: x / RBC: x / WBC x   Sq Epi: x / Non Sq Epi: x / Bacteria: x        RADIOLOGY & ADDITIONAL STUDIES:

## 2024-11-27 NOTE — CHART NOTE - NSCHARTNOTEFT_GEN_A_CORE
POST-OPERATIVE NOTE    Subjective:  Patient seen at bedside this PM. Pt reports pain in the RLE s/p RLE wound debridement well managed with pain regimen. Pt is in no acute distress.    Vital Signs Last 24 Hrs  T(C): 36.6 (27 Nov 2024 15:27), Max: 37.7 (26 Nov 2024 19:03)  T(F): 97.8 (27 Nov 2024 15:27), Max: 99.8 (26 Nov 2024 19:03)  HR: 82 (27 Nov 2024 15:27) (82 - 114)  BP: 125/75 (27 Nov 2024 15:27) (93/62 - 128/67)  BP(mean): 72 (27 Nov 2024 11:31) (72 - 77)  RR: 18 (27 Nov 2024 15:27) (16 - 20)  SpO2: 92% (27 Nov 2024 15:27) (92% - 100%)    Parameters below as of 27 Nov 2024 15:27  Patient On (Oxygen Delivery Method): room air      I&O's Detail      PAST MEDICAL & SURGICAL HISTORY:  Hypertension      No significant past surgical history            Physical Exam:  General: NAD, resting in bed  Pulmonary: Nonlabored breathing, no respiratory distress  Cardiovascular: NSR  Extremities: WWP, bandages in place, moderate amount of serous drainage noted on bed sheet. Bandages were dry to touch.    LABS:                        9.8    9.99  )-----------( 175      ( 27 Nov 2024 10:59 )             29.4     11-27    132[L]  |  98  |  18.5  ----------------------------<  94  3.6   |  20.0[L]  |  0.57    Ca    8.4      27 Nov 2024 10:59  Phos  3.6     11-27  Mg     1.9     11-27    TPro  6.3[L]  /  Alb  3.4  /  TBili  1.9  /  DBili  x   /  AST  21  /  ALT  17  /  AlkPhos  106  11-27    PT/INR - ( 27 Nov 2024 10:59 )   PT: 13.5 sec;   INR: 1.17 ratio         PTT - ( 27 Nov 2024 10:59 )  PTT:30.1 sec  CAPILLARY BLOOD GLUCOSE          Radiology and Additional Studies:    Assessment:  The patient is a 69y Male who is now several hours post-op from a RLE wound debridement. Pain is well managed, plan to reassess wound in the AM, with potential for re-debridement tomorrow depending on findings at bedside.     Plan:  - Pain control as needed  - Continue Abx  - Hep gtt  - OOB and ambulating as tolerated  -Trend H and H  - F/u PM labs POST-OPERATIVE NOTE    Subjective:  Patient seen at bedside this PM. Pt reports some pain in the RLE s/p RLE wound debridement well managed with pain regimen. Pt is in no acute distress. Aware of plan for dressing change tomorrow and potential for more debridement based on bedside findings.     Vital Signs Last 24 Hrs  T(C): 36.6 (27 Nov 2024 15:27), Max: 37.7 (26 Nov 2024 19:03)  T(F): 97.8 (27 Nov 2024 15:27), Max: 99.8 (26 Nov 2024 19:03)  HR: 82 (27 Nov 2024 15:27) (82 - 114)  BP: 125/75 (27 Nov 2024 15:27) (93/62 - 128/67)  BP(mean): 72 (27 Nov 2024 11:31) (72 - 77)  RR: 18 (27 Nov 2024 15:27) (16 - 20)  SpO2: 92% (27 Nov 2024 15:27) (92% - 100%)    Parameters below as of 27 Nov 2024 15:27  Patient On (Oxygen Delivery Method): room air      I&O's Detail      PAST MEDICAL & SURGICAL HISTORY:  Hypertension      No significant past surgical history            Physical Exam:  General: NAD, resting in bed  Pulmonary: Nonlabored breathing, no respiratory distress  Cardiovascular: NSR  Extremities: WWP, bandages in place, moderate amount of serous drainage dried on bed sheet. Bandages were dry to touch.    LABS:                        9.8    9.99  )-----------( 175      ( 27 Nov 2024 10:59 )             29.4     11-27    132[L]  |  98  |  18.5  ----------------------------<  94  3.6   |  20.0[L]  |  0.57    Ca    8.4      27 Nov 2024 10:59  Phos  3.6     11-27  Mg     1.9     11-27    TPro  6.3[L]  /  Alb  3.4  /  TBili  1.9  /  DBili  x   /  AST  21  /  ALT  17  /  AlkPhos  106  11-27    PT/INR - ( 27 Nov 2024 10:59 )   PT: 13.5 sec;   INR: 1.17 ratio         PTT - ( 27 Nov 2024 10:59 )  PTT:30.1 sec  CAPILLARY BLOOD GLUCOSE              Assessment:  The patient is a 69y Male who is now several hours post-procedure from a bedside RLE wound debridement. Patient is progressing well, pain is well managed, plan to reassess wound in the AM, with potential for re-debridement tomorrow depending on findings at bedside.     Plan:  - Pain control as needed  - Continue Abx  - Hep gtt  - Level of monitoring: tele  - Strict Is/Os  - Trend H and H  - F/u post procedure labs  - continue home meds POST-OPERATIVE NOTE    Subjective:  Patient seen at bedside this PM. Pt reports some pain in the RLE s/p RLE wound debridement well managed with pain regimen. Pt is in no acute distress. Aware of plan for dressing change tomorrow and potential for more debridement based on bedside findings.     Vital Signs Last 24 Hrs  T(C): 36.6 (27 Nov 2024 15:27), Max: 37.7 (26 Nov 2024 19:03)  T(F): 97.8 (27 Nov 2024 15:27), Max: 99.8 (26 Nov 2024 19:03)  HR: 82 (27 Nov 2024 15:27) (82 - 114)  BP: 125/75 (27 Nov 2024 15:27) (93/62 - 128/67)  BP(mean): 72 (27 Nov 2024 11:31) (72 - 77)  RR: 18 (27 Nov 2024 15:27) (16 - 20)  SpO2: 92% (27 Nov 2024 15:27) (92% - 100%)    Parameters below as of 27 Nov 2024 15:27  Patient On (Oxygen Delivery Method): room air      I&O's Detail      PAST MEDICAL & SURGICAL HISTORY:  Hypertension      No significant past surgical history            Physical Exam:  General: NAD, resting in bed  Pulmonary: Nonlabored breathing, no respiratory distress  Cardiovascular: NSR  Extremities: WWP, bandages in place, moderate amount of serous drainage dried on bed sheet. Bandages were dry to touch.    LABS:                        9.8    9.99  )-----------( 175      ( 27 Nov 2024 10:59 )             29.4     11-27    132[L]  |  98  |  18.5  ----------------------------<  94  3.6   |  20.0[L]  |  0.57    Ca    8.4      27 Nov 2024 10:59  Phos  3.6     11-27  Mg     1.9     11-27    TPro  6.3[L]  /  Alb  3.4  /  TBili  1.9  /  DBili  x   /  AST  21  /  ALT  17  /  AlkPhos  106  11-27    PT/INR - ( 27 Nov 2024 10:59 )   PT: 13.5 sec;   INR: 1.17 ratio         PTT - ( 27 Nov 2024 10:59 )  PTT:30.1 sec  CAPILLARY BLOOD GLUCOSE              Assessment:  The patient is a 69y Male who is now several hours post-procedure from a bedside RLE wound debridement. Patient is progressing well, pain is well managed, plan to reassess wound in the AM, with potential for re-debridement tomorrow depending on findings at bedside.     Plan:  - Pain control as needed  - Regular diet, NPO @ MN  - Continue Abx  - Hep gtt  - Level of monitoring: tele  - Strict Is/Os  - Trend H and H  - F/u post procedure labs  - continue home meds

## 2024-11-27 NOTE — PATIENT PROFILE ADULT - FALL HARM RISK - HARM RISK INTERVENTIONS
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
## 2024-11-28 LAB
ACANTHOCYTES BLD QL SMEAR: SLIGHT — SIGNIFICANT CHANGE UP
ANION GAP SERPL CALC-SCNC: 10 MMOL/L — SIGNIFICANT CHANGE UP (ref 5–17)
APTT BLD: 71.5 SEC — HIGH (ref 24.5–35.6)
APTT BLD: 80.8 SEC — HIGH (ref 24.5–35.6)
BASOPHILS # BLD AUTO: 0.06 K/UL — SIGNIFICANT CHANGE UP (ref 0–0.2)
BASOPHILS NFR BLD AUTO: 0.9 % — SIGNIFICANT CHANGE UP (ref 0–2)
BUN SERPL-MCNC: 12.8 MG/DL — SIGNIFICANT CHANGE UP (ref 8–20)
CALCIUM SERPL-MCNC: 7.7 MG/DL — LOW (ref 8.4–10.5)
CHLORIDE SERPL-SCNC: 99 MMOL/L — SIGNIFICANT CHANGE UP (ref 96–108)
CO2 SERPL-SCNC: 23 MMOL/L — SIGNIFICANT CHANGE UP (ref 22–29)
CREAT SERPL-MCNC: 0.57 MG/DL — SIGNIFICANT CHANGE UP (ref 0.5–1.3)
EGFR: 107 ML/MIN/1.73M2 — SIGNIFICANT CHANGE UP
EOSINOPHIL # BLD AUTO: 0.29 K/UL — SIGNIFICANT CHANGE UP (ref 0–0.5)
EOSINOPHIL NFR BLD AUTO: 4.4 % — SIGNIFICANT CHANGE UP (ref 0–6)
GIANT PLATELETS BLD QL SMEAR: PRESENT — SIGNIFICANT CHANGE UP
GLUCOSE SERPL-MCNC: 101 MG/DL — HIGH (ref 70–99)
HCT VFR BLD CALC: 25.8 % — LOW (ref 39–50)
HGB BLD-MCNC: 8.5 G/DL — LOW (ref 13–17)
LYMPHOCYTES # BLD AUTO: 0.89 K/UL — LOW (ref 1–3.3)
LYMPHOCYTES # BLD AUTO: 13.3 % — SIGNIFICANT CHANGE UP (ref 13–44)
LYMPHOCYTES # SPEC AUTO: 0.9 % — HIGH (ref 0–0)
MAGNESIUM SERPL-MCNC: 1.7 MG/DL — LOW (ref 1.8–2.6)
MANUAL SMEAR VERIFICATION: SIGNIFICANT CHANGE UP
MCHC RBC-ENTMCNC: 29.9 PG — SIGNIFICANT CHANGE UP (ref 27–34)
MCHC RBC-ENTMCNC: 32.9 G/DL — SIGNIFICANT CHANGE UP (ref 32–36)
MCV RBC AUTO: 90.8 FL — SIGNIFICANT CHANGE UP (ref 80–100)
METAMYELOCYTES # FLD: 0.9 % — HIGH (ref 0–0)
MONOCYTES # BLD AUTO: 0.23 K/UL — SIGNIFICANT CHANGE UP (ref 0–0.9)
MONOCYTES NFR BLD AUTO: 3.5 % — SIGNIFICANT CHANGE UP (ref 2–14)
MYELOCYTES NFR BLD: 2.6 % — HIGH (ref 0–0)
NEUTROPHILS # BLD AUTO: 4.86 K/UL — SIGNIFICANT CHANGE UP (ref 1.8–7.4)
NEUTROPHILS NFR BLD AUTO: 72.6 % — SIGNIFICANT CHANGE UP (ref 43–77)
NRBC # BLD: 1 /100 WBCS — HIGH (ref 0–0)
PHOSPHATE SERPL-MCNC: 3 MG/DL — SIGNIFICANT CHANGE UP (ref 2.4–4.7)
PLAT MORPH BLD: ABNORMAL
PLATELET # BLD AUTO: 161 K/UL — SIGNIFICANT CHANGE UP (ref 150–400)
POIKILOCYTOSIS BLD QL AUTO: SLIGHT — SIGNIFICANT CHANGE UP
POLYCHROMASIA BLD QL SMEAR: SIGNIFICANT CHANGE UP
POTASSIUM SERPL-MCNC: 3.5 MMOL/L — SIGNIFICANT CHANGE UP (ref 3.5–5.3)
POTASSIUM SERPL-SCNC: 3.5 MMOL/L — SIGNIFICANT CHANGE UP (ref 3.5–5.3)
RBC # BLD: 2.84 M/UL — LOW (ref 4.2–5.8)
RBC # FLD: 14 % — SIGNIFICANT CHANGE UP (ref 10.3–14.5)
RBC BLD AUTO: ABNORMAL
SODIUM SERPL-SCNC: 132 MMOL/L — LOW (ref 135–145)
VANCOMYCIN TROUGH SERPL-MCNC: 13.1 UG/ML — SIGNIFICANT CHANGE UP (ref 10–20)
VARIANT LYMPHS # BLD: 0.9 % — SIGNIFICANT CHANGE UP (ref 0–6)
WBC # BLD: 6.69 K/UL — SIGNIFICANT CHANGE UP (ref 3.8–10.5)
WBC # FLD AUTO: 6.69 K/UL — SIGNIFICANT CHANGE UP (ref 3.8–10.5)

## 2024-11-28 PROCEDURE — 99232 SBSQ HOSP IP/OBS MODERATE 35: CPT | Mod: 24

## 2024-11-28 RX ORDER — POTASSIUM CHLORIDE 600 MG/1
10 TABLET, EXTENDED RELEASE ORAL ONCE
Refills: 0 | Status: COMPLETED | OUTPATIENT
Start: 2024-11-28 | End: 2024-11-28

## 2024-11-28 RX ORDER — POTASSIUM CHLORIDE 600 MG/1
20 TABLET, EXTENDED RELEASE ORAL
Refills: 0 | Status: COMPLETED | OUTPATIENT
Start: 2024-11-28 | End: 2024-11-28

## 2024-11-28 RX ORDER — POTASSIUM CHLORIDE 600 MG/1
10 TABLET, EXTENDED RELEASE ORAL ONCE
Refills: 0 | Status: DISCONTINUED | OUTPATIENT
Start: 2024-11-28 | End: 2024-11-28

## 2024-11-28 RX ADMIN — KETOROLAC TROMETHAMINE 15 MILLIGRAM(S): 30 INJECTION INTRAMUSCULAR; INTRAVENOUS at 06:50

## 2024-11-28 RX ADMIN — BENZONATATE 100 MILLIGRAM(S): 100 CAPSULE ORAL at 23:03

## 2024-11-28 RX ADMIN — Medication 166.67 MILLIGRAM(S): at 01:31

## 2024-11-28 RX ADMIN — POTASSIUM CHLORIDE 50 MILLIEQUIVALENT(S): 600 TABLET, EXTENDED RELEASE ORAL at 11:34

## 2024-11-28 RX ADMIN — METHOCARBAMOL 750 MILLIGRAM(S): 500 TABLET, FILM COATED ORAL at 05:49

## 2024-11-28 RX ADMIN — POTASSIUM CHLORIDE 50 MILLIEQUIVALENT(S): 600 TABLET, EXTENDED RELEASE ORAL at 09:53

## 2024-11-28 RX ADMIN — Medication 1500 UNIT(S)/HR: at 12:07

## 2024-11-28 RX ADMIN — Medication 166.67 MILLIGRAM(S): at 13:17

## 2024-11-28 RX ADMIN — ACETAMINOPHEN 500MG 975 MILLIGRAM(S): 500 TABLET, COATED ORAL at 06:49

## 2024-11-28 RX ADMIN — BENZONATATE 100 MILLIGRAM(S): 100 CAPSULE ORAL at 05:50

## 2024-11-28 RX ADMIN — ACETAMINOPHEN 500MG 975 MILLIGRAM(S): 500 TABLET, COATED ORAL at 11:35

## 2024-11-28 RX ADMIN — ACETAMINOPHEN 500MG 975 MILLIGRAM(S): 500 TABLET, COATED ORAL at 05:49

## 2024-11-28 RX ADMIN — AMLODIPINE BESYLATE 5 MILLIGRAM(S): 10 TABLET ORAL at 05:49

## 2024-11-28 RX ADMIN — ACETAMINOPHEN 500MG 975 MILLIGRAM(S): 500 TABLET, COATED ORAL at 18:08

## 2024-11-28 RX ADMIN — Medication 1500 UNIT(S)/HR: at 00:22

## 2024-11-28 RX ADMIN — HYDROMORPHONE HYDROCHLORIDE 0.5 MILLIGRAM(S): 2 TABLET ORAL at 00:10

## 2024-11-28 RX ADMIN — PIPERACILLIN SODIUM AND TAZOBACTAM SODIUM 25 GRAM(S): 4; .5 INJECTION, POWDER, LYOPHILIZED, FOR SOLUTION INTRAVENOUS at 05:50

## 2024-11-28 RX ADMIN — FAMOTIDINE 20 MILLIGRAM(S): 20 TABLET, FILM COATED ORAL at 11:35

## 2024-11-28 RX ADMIN — METHOCARBAMOL 750 MILLIGRAM(S): 500 TABLET, FILM COATED ORAL at 18:08

## 2024-11-28 RX ADMIN — Medication 1500 UNIT(S)/HR: at 05:07

## 2024-11-28 RX ADMIN — LIDOCAINE 1 PATCH: 40 CREAM TOPICAL at 07:00

## 2024-11-28 RX ADMIN — KETOROLAC TROMETHAMINE 15 MILLIGRAM(S): 30 INJECTION INTRAMUSCULAR; INTRAVENOUS at 18:08

## 2024-11-28 RX ADMIN — METHOCARBAMOL 750 MILLIGRAM(S): 500 TABLET, FILM COATED ORAL at 11:35

## 2024-11-28 RX ADMIN — CLINDAMYCIN HYDROCHLORIDE 100 MILLIGRAM(S): 300 CAPSULE ORAL at 05:50

## 2024-11-28 RX ADMIN — KETOROLAC TROMETHAMINE 15 MILLIGRAM(S): 30 INJECTION INTRAMUSCULAR; INTRAVENOUS at 05:50

## 2024-11-28 RX ADMIN — PIPERACILLIN SODIUM AND TAZOBACTAM SODIUM 25 GRAM(S): 4; .5 INJECTION, POWDER, LYOPHILIZED, FOR SOLUTION INTRAVENOUS at 15:39

## 2024-11-28 RX ADMIN — KETOROLAC TROMETHAMINE 15 MILLIGRAM(S): 30 INJECTION INTRAMUSCULAR; INTRAVENOUS at 00:10

## 2024-11-28 RX ADMIN — Medication 1500 UNIT(S)/HR: at 07:44

## 2024-11-28 RX ADMIN — KETOROLAC TROMETHAMINE 15 MILLIGRAM(S): 30 INJECTION INTRAMUSCULAR; INTRAVENOUS at 11:34

## 2024-11-28 RX ADMIN — Medication 1500 UNIT(S)/HR: at 19:55
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
## 2024-11-29 LAB
A1C WITH ESTIMATED AVERAGE GLUCOSE RESULT: 5 % — SIGNIFICANT CHANGE UP (ref 4–5.6)
ACETONE SERPL-MCNC: NEGATIVE — SIGNIFICANT CHANGE UP
ANION GAP SERPL CALC-SCNC: 15 MMOL/L — SIGNIFICANT CHANGE UP (ref 5–17)
APTT BLD: 57.9 SEC — HIGH (ref 24.5–35.6)
BUN SERPL-MCNC: 10.9 MG/DL — SIGNIFICANT CHANGE UP (ref 8–20)
CALCIUM SERPL-MCNC: 8.5 MG/DL — SIGNIFICANT CHANGE UP (ref 8.4–10.5)
CHLORIDE SERPL-SCNC: 106 MMOL/L — SIGNIFICANT CHANGE UP (ref 96–108)
CO2 SERPL-SCNC: 19 MMOL/L — LOW (ref 22–29)
CREAT SERPL-MCNC: 0.62 MG/DL — SIGNIFICANT CHANGE UP (ref 0.5–1.3)
EGFR: 104 ML/MIN/1.73M2 — SIGNIFICANT CHANGE UP
ESTIMATED AVERAGE GLUCOSE: 97 MG/DL — SIGNIFICANT CHANGE UP (ref 68–114)
GLUCOSE SERPL-MCNC: 95 MG/DL — SIGNIFICANT CHANGE UP (ref 70–99)
HCT VFR BLD CALC: 28.4 % — LOW (ref 39–50)
HGB BLD-MCNC: 9.2 G/DL — LOW (ref 13–17)
MAGNESIUM SERPL-MCNC: 1.9 MG/DL — SIGNIFICANT CHANGE UP (ref 1.6–2.6)
MCHC RBC-ENTMCNC: 29.9 PG — SIGNIFICANT CHANGE UP (ref 27–34)
MCHC RBC-ENTMCNC: 32.4 G/DL — SIGNIFICANT CHANGE UP (ref 32–36)
MCV RBC AUTO: 92.2 FL — SIGNIFICANT CHANGE UP (ref 80–100)
PHOSPHATE SERPL-MCNC: 2.8 MG/DL — SIGNIFICANT CHANGE UP (ref 2.4–4.7)
PLATELET # BLD AUTO: 253 K/UL — SIGNIFICANT CHANGE UP (ref 150–400)
POTASSIUM SERPL-MCNC: 3.5 MMOL/L — SIGNIFICANT CHANGE UP (ref 3.5–5.3)
POTASSIUM SERPL-SCNC: 3.5 MMOL/L — SIGNIFICANT CHANGE UP (ref 3.5–5.3)
RBC # BLD: 3.08 M/UL — LOW (ref 4.2–5.8)
RBC # FLD: 14.3 % — SIGNIFICANT CHANGE UP (ref 10.3–14.5)
SODIUM SERPL-SCNC: 140 MMOL/L — SIGNIFICANT CHANGE UP (ref 135–145)
WBC # BLD: 7.36 K/UL — SIGNIFICANT CHANGE UP (ref 3.8–10.5)
WBC # FLD AUTO: 7.36 K/UL — SIGNIFICANT CHANGE UP (ref 3.8–10.5)

## 2024-11-29 PROCEDURE — 99223 1ST HOSP IP/OBS HIGH 75: CPT

## 2024-11-29 PROCEDURE — 99231 SBSQ HOSP IP/OBS SF/LOW 25: CPT

## 2024-11-29 RX ORDER — ENOXAPARIN SODIUM 30 MG/.3ML
75 INJECTION SUBCUTANEOUS EVERY 12 HOURS
Refills: 0 | Status: DISCONTINUED | OUTPATIENT
Start: 2024-11-29 | End: 2024-12-03

## 2024-11-29 RX ORDER — POTASSIUM CHLORIDE 600 MG/1
40 TABLET, EXTENDED RELEASE ORAL ONCE
Refills: 0 | Status: COMPLETED | OUTPATIENT
Start: 2024-11-29 | End: 2024-11-29

## 2024-11-29 RX ADMIN — TAMSULOSIN HYDROCHLORIDE 0.4 MILLIGRAM(S): 0.4 CAPSULE ORAL at 21:50

## 2024-11-29 RX ADMIN — Medication 1500 UNIT(S)/HR: at 00:08

## 2024-11-29 RX ADMIN — PIPERACILLIN SODIUM AND TAZOBACTAM SODIUM 25 GRAM(S): 4; .5 INJECTION, POWDER, LYOPHILIZED, FOR SOLUTION INTRAVENOUS at 05:21

## 2024-11-29 RX ADMIN — KETOROLAC TROMETHAMINE 15 MILLIGRAM(S): 30 INJECTION INTRAMUSCULAR; INTRAVENOUS at 05:21

## 2024-11-29 RX ADMIN — Medication UNIT(S)/HR: at 06:43

## 2024-11-29 RX ADMIN — BENZONATATE 100 MILLIGRAM(S): 100 CAPSULE ORAL at 21:51

## 2024-11-29 RX ADMIN — Medication 166.67 MILLIGRAM(S): at 23:18

## 2024-11-29 RX ADMIN — METHOCARBAMOL 750 MILLIGRAM(S): 500 TABLET, FILM COATED ORAL at 21:52

## 2024-11-29 RX ADMIN — ACETAMINOPHEN 500MG 975 MILLIGRAM(S): 500 TABLET, COATED ORAL at 06:22

## 2024-11-29 RX ADMIN — KETOROLAC TROMETHAMINE 15 MILLIGRAM(S): 30 INJECTION INTRAMUSCULAR; INTRAVENOUS at 10:48

## 2024-11-29 RX ADMIN — Medication 166.67 MILLIGRAM(S): at 15:48

## 2024-11-29 RX ADMIN — Medication UNIT(S)/HR: at 07:29

## 2024-11-29 RX ADMIN — BENZONATATE 100 MILLIGRAM(S): 100 CAPSULE ORAL at 05:21

## 2024-11-29 RX ADMIN — ACETAMINOPHEN 500MG 975 MILLIGRAM(S): 500 TABLET, COATED ORAL at 17:40

## 2024-11-29 RX ADMIN — AMLODIPINE BESYLATE 5 MILLIGRAM(S): 10 TABLET ORAL at 05:35

## 2024-11-29 RX ADMIN — ACETAMINOPHEN 500MG 975 MILLIGRAM(S): 500 TABLET, COATED ORAL at 05:22

## 2024-11-29 RX ADMIN — KETOROLAC TROMETHAMINE 15 MILLIGRAM(S): 30 INJECTION INTRAMUSCULAR; INTRAVENOUS at 11:30

## 2024-11-29 RX ADMIN — ENOXAPARIN SODIUM 75 MILLIGRAM(S): 30 INJECTION SUBCUTANEOUS at 14:53

## 2024-11-29 RX ADMIN — COLLAGENASE SANTYL 1 APPLICATION(S): 250 OINTMENT TOPICAL at 15:57

## 2024-11-29 RX ADMIN — ACETAMINOPHEN 500MG 975 MILLIGRAM(S): 500 TABLET, COATED ORAL at 15:00

## 2024-11-29 RX ADMIN — KETOROLAC TROMETHAMINE 15 MILLIGRAM(S): 30 INJECTION INTRAMUSCULAR; INTRAVENOUS at 21:52

## 2024-11-29 RX ADMIN — FAMOTIDINE 20 MILLIGRAM(S): 20 TABLET, FILM COATED ORAL at 14:54

## 2024-11-29 RX ADMIN — KETOROLAC TROMETHAMINE 15 MILLIGRAM(S): 30 INJECTION INTRAMUSCULAR; INTRAVENOUS at 17:39

## 2024-11-29 RX ADMIN — POTASSIUM CHLORIDE 40 MILLIEQUIVALENT(S): 600 TABLET, EXTENDED RELEASE ORAL at 17:58

## 2024-11-29 RX ADMIN — PIPERACILLIN SODIUM AND TAZOBACTAM SODIUM 25 GRAM(S): 4; .5 INJECTION, POWDER, LYOPHILIZED, FOR SOLUTION INTRAVENOUS at 21:59

## 2024-11-29 RX ADMIN — PIPERACILLIN SODIUM AND TAZOBACTAM SODIUM 25 GRAM(S): 4; .5 INJECTION, POWDER, LYOPHILIZED, FOR SOLUTION INTRAVENOUS at 15:47

## 2024-11-29 RX ADMIN — METHOCARBAMOL 750 MILLIGRAM(S): 500 TABLET, FILM COATED ORAL at 10:47

## 2024-11-29 RX ADMIN — KETOROLAC TROMETHAMINE 15 MILLIGRAM(S): 30 INJECTION INTRAMUSCULAR; INTRAVENOUS at 22:52

## 2024-11-29 RX ADMIN — Medication 25 GRAM(S): at 05:19

## 2024-11-29 RX ADMIN — Medication 2 TABLET(S): at 21:49

## 2024-11-29 RX ADMIN — ACETAMINOPHEN 500MG 975 MILLIGRAM(S): 500 TABLET, COATED ORAL at 14:50

## 2024-11-29 RX ADMIN — KETOROLAC TROMETHAMINE 15 MILLIGRAM(S): 30 INJECTION INTRAMUSCULAR; INTRAVENOUS at 06:22

## 2024-11-29 RX ADMIN — METHOCARBAMOL 750 MILLIGRAM(S): 500 TABLET, FILM COATED ORAL at 05:21

## 2024-11-29 RX ADMIN — METHOCARBAMOL 750 MILLIGRAM(S): 500 TABLET, FILM COATED ORAL at 17:43

## 2024-11-29 RX ADMIN — ACETAMINOPHEN 500MG 975 MILLIGRAM(S): 500 TABLET, COATED ORAL at 23:24

## 2024-11-29 NOTE — PROGRESS NOTE ADULT - SUBJECTIVE AND OBJECTIVE BOX
Subjective: No acute events overnight. Wound vac placed on 11/28 with good seal still. Wound cultures collected, however there does not appear to be any specimen in the lab. On abx, tolerating. Denies any n/v/c/f.       MEDICATIONS  (STANDING):  acetaminophen     Tablet .. 975 milliGRAM(s) Oral every 6 hours  amLODIPine   Tablet 5 milliGRAM(s) Oral daily  benzonatate 100 milliGRAM(s) Oral three times a day  collagenase Ointment 1 Application(s) Topical daily  famotidine    Tablet 20 milliGRAM(s) Oral daily  heparin  Infusion.  Unit(s)/Hr (13 mL/Hr) IV Continuous <Continuous>  influenza  Vaccine (HIGH DOSE) 0.5 milliLiter(s) IntraMuscular once  ketorolac   Injectable 15 milliGRAM(s) IV Push every 6 hours  lidocaine   4% Patch 1 Patch Transdermal every 24 hours  lidocaine 1%/epinephrine 1:100,000 Inj 20 milliLiter(s) Local Injection once  methocarbamol 750 milliGRAM(s) Oral every 6 hours  piperacillin/tazobactam IVPB.. 3.375 Gram(s) IV Intermittent every 8 hours  potassium chloride  10 mEq/100 mL IVPB 10 milliEquivalent(s) IV Intermittent once  senna 2 Tablet(s) Oral at bedtime  tamsulosin 0.4 milliGRAM(s) Oral at bedtime  vancomycin  IVPB 1250 milliGRAM(s) IV Intermittent every 12 hours    MEDICATIONS  (PRN):  ondansetron Injectable 4 milliGRAM(s) IV Push every 6 hours PRN Nausea  oxyCODONE    IR 5 milliGRAM(s) Oral every 4 hours PRN Moderate Pain (4 - 6)  oxyCODONE    IR 10 milliGRAM(s) Oral every 4 hours PRN Severe Pain (7 - 10)  polyethylene glycol 3350 17 Gram(s) Oral daily PRN Constipation  simethicone 80 milliGRAM(s) Chew three times a day PRN Gas      Vital Signs Last 24 Hrs  T(C): 36.5 (29 Nov 2024 05:05), Max: 36.9 (28 Nov 2024 12:26)  T(F): 97.7 (29 Nov 2024 05:05), Max: 98.4 (28 Nov 2024 12:26)  HR: 89 (29 Nov 2024 05:05) (73 - 89)  BP: 126/72 (29 Nov 2024 05:05) (102/51 - 141/73)  BP(mean): --  RR: 17 (29 Nov 2024 05:05) (16 - 18)  SpO2: 95% (29 Nov 2024 05:05) (94% - 95%)    Parameters below as of 29 Nov 2024 05:05  Patient On (Oxygen Delivery Method): room air        Physical Exam:    Constitutional: NAD  HEENT: PERRL, EOMI  Respiratory: Respirations non-labored, no accessory muscle use  Gastrointestinal: Soft, non-tender, non-distended  Neurological: A&O x 3      LABS:                        9.2    7.36  )-----------( 253      ( 29 Nov 2024 05:33 )             28.4     11-29    140  |  106  |  10.9  ----------------------------<  95  3.5   |  19.0[L]  |  0.62    Ca    8.5      29 Nov 2024 05:33  Phos  2.8     11-29  Mg     1.9     11-29      PT/INR - ( 27 Nov 2024 10:59 )   PT: 13.5 sec;   INR: 1.17 ratio         PTT - ( 29 Nov 2024 05:33 )  PTT:57.9 sec  Urinalysis Basic - ( 29 Nov 2024 05:33 )    Color: x / Appearance: x / SG: x / pH: x  Gluc: 95 mg/dL / Ketone: x  / Bili: x / Urobili: x   Blood: x / Protein: x / Nitrite: x   Leuk Esterase: x / RBC: x / WBC x   Sq Epi: x / Non Sq Epi: x / Bacteria: x
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
## 2024-11-30 LAB
ANION GAP SERPL CALC-SCNC: 12 MMOL/L — SIGNIFICANT CHANGE UP (ref 5–17)
APTT BLD: 41.8 SEC — HIGH (ref 24.5–35.6)
BUN SERPL-MCNC: 8.4 MG/DL — SIGNIFICANT CHANGE UP (ref 8–20)
CALCIUM SERPL-MCNC: 8.2 MG/DL — LOW (ref 8.4–10.5)
CHLORIDE SERPL-SCNC: 106 MMOL/L — SIGNIFICANT CHANGE UP (ref 96–108)
CO2 SERPL-SCNC: 21 MMOL/L — LOW (ref 22–29)
CREAT SERPL-MCNC: 0.65 MG/DL — SIGNIFICANT CHANGE UP (ref 0.5–1.3)
EGFR: 103 ML/MIN/1.73M2 — SIGNIFICANT CHANGE UP
GLUCOSE SERPL-MCNC: 92 MG/DL — SIGNIFICANT CHANGE UP (ref 70–99)
HCT VFR BLD CALC: 28.2 % — LOW (ref 39–50)
HGB BLD-MCNC: 9.1 G/DL — LOW (ref 13–17)
MAGNESIUM SERPL-MCNC: 2 MG/DL — SIGNIFICANT CHANGE UP (ref 1.6–2.6)
MCHC RBC-ENTMCNC: 29.6 PG — SIGNIFICANT CHANGE UP (ref 27–34)
MCHC RBC-ENTMCNC: 32.3 G/DL — SIGNIFICANT CHANGE UP (ref 32–36)
MCV RBC AUTO: 91.9 FL — SIGNIFICANT CHANGE UP (ref 80–100)
PHOSPHATE SERPL-MCNC: 3.6 MG/DL — SIGNIFICANT CHANGE UP (ref 2.4–4.7)
PLATELET # BLD AUTO: 292 K/UL — SIGNIFICANT CHANGE UP (ref 150–400)
POTASSIUM SERPL-MCNC: 3.9 MMOL/L — SIGNIFICANT CHANGE UP (ref 3.5–5.3)
POTASSIUM SERPL-SCNC: 3.9 MMOL/L — SIGNIFICANT CHANGE UP (ref 3.5–5.3)
RBC # BLD: 3.07 M/UL — LOW (ref 4.2–5.8)
RBC # FLD: 14 % — SIGNIFICANT CHANGE UP (ref 10.3–14.5)
SODIUM SERPL-SCNC: 139 MMOL/L — SIGNIFICANT CHANGE UP (ref 135–145)
WBC # BLD: 4.34 K/UL — SIGNIFICANT CHANGE UP (ref 3.8–10.5)
WBC # FLD AUTO: 4.34 K/UL — SIGNIFICANT CHANGE UP (ref 3.8–10.5)

## 2024-11-30 PROCEDURE — 99024 POSTOP FOLLOW-UP VISIT: CPT

## 2024-11-30 RX ADMIN — AMLODIPINE BESYLATE 5 MILLIGRAM(S): 10 TABLET ORAL at 04:11

## 2024-11-30 RX ADMIN — ACETAMINOPHEN 500MG 975 MILLIGRAM(S): 500 TABLET, COATED ORAL at 12:11

## 2024-11-30 RX ADMIN — PIPERACILLIN SODIUM AND TAZOBACTAM SODIUM 25 GRAM(S): 4; .5 INJECTION, POWDER, LYOPHILIZED, FOR SOLUTION INTRAVENOUS at 04:11

## 2024-11-30 RX ADMIN — PIPERACILLIN SODIUM AND TAZOBACTAM SODIUM 25 GRAM(S): 4; .5 INJECTION, POWDER, LYOPHILIZED, FOR SOLUTION INTRAVENOUS at 22:13

## 2024-11-30 RX ADMIN — FAMOTIDINE 20 MILLIGRAM(S): 20 TABLET, FILM COATED ORAL at 11:42

## 2024-11-30 RX ADMIN — BENZONATATE 100 MILLIGRAM(S): 100 CAPSULE ORAL at 14:40

## 2024-11-30 RX ADMIN — KETOROLAC TROMETHAMINE 15 MILLIGRAM(S): 30 INJECTION INTRAMUSCULAR; INTRAVENOUS at 12:11

## 2024-11-30 RX ADMIN — KETOROLAC TROMETHAMINE 15 MILLIGRAM(S): 30 INJECTION INTRAMUSCULAR; INTRAVENOUS at 18:35

## 2024-11-30 RX ADMIN — ACETAMINOPHEN 500MG 975 MILLIGRAM(S): 500 TABLET, COATED ORAL at 04:10

## 2024-11-30 RX ADMIN — ACETAMINOPHEN 500MG 975 MILLIGRAM(S): 500 TABLET, COATED ORAL at 05:10

## 2024-11-30 RX ADMIN — Medication 166.67 MILLIGRAM(S): at 11:42

## 2024-11-30 RX ADMIN — METHOCARBAMOL 750 MILLIGRAM(S): 500 TABLET, FILM COATED ORAL at 04:10

## 2024-11-30 RX ADMIN — ACETAMINOPHEN 500MG 975 MILLIGRAM(S): 500 TABLET, COATED ORAL at 11:41

## 2024-11-30 RX ADMIN — PIPERACILLIN SODIUM AND TAZOBACTAM SODIUM 25 GRAM(S): 4; .5 INJECTION, POWDER, LYOPHILIZED, FOR SOLUTION INTRAVENOUS at 14:39

## 2024-11-30 RX ADMIN — ACETAMINOPHEN 500MG 975 MILLIGRAM(S): 500 TABLET, COATED ORAL at 00:24

## 2024-11-30 RX ADMIN — ENOXAPARIN SODIUM 75 MILLIGRAM(S): 30 INJECTION SUBCUTANEOUS at 04:08

## 2024-11-30 RX ADMIN — METHOCARBAMOL 750 MILLIGRAM(S): 500 TABLET, FILM COATED ORAL at 18:35

## 2024-11-30 RX ADMIN — BENZONATATE 100 MILLIGRAM(S): 100 CAPSULE ORAL at 04:10

## 2024-11-30 RX ADMIN — KETOROLAC TROMETHAMINE 15 MILLIGRAM(S): 30 INJECTION INTRAMUSCULAR; INTRAVENOUS at 05:11

## 2024-11-30 RX ADMIN — KETOROLAC TROMETHAMINE 15 MILLIGRAM(S): 30 INJECTION INTRAMUSCULAR; INTRAVENOUS at 04:11

## 2024-11-30 RX ADMIN — ENOXAPARIN SODIUM 75 MILLIGRAM(S): 30 INJECTION SUBCUTANEOUS at 16:56

## 2024-11-30 RX ADMIN — OXYCODONE HYDROCHLORIDE 5 MILLIGRAM(S): 30 TABLET ORAL at 21:30

## 2024-11-30 RX ADMIN — ACETAMINOPHEN 500MG 975 MILLIGRAM(S): 500 TABLET, COATED ORAL at 18:34

## 2024-11-30 RX ADMIN — KETOROLAC TROMETHAMINE 15 MILLIGRAM(S): 30 INJECTION INTRAMUSCULAR; INTRAVENOUS at 11:41

## 2024-11-30 RX ADMIN — METHOCARBAMOL 750 MILLIGRAM(S): 500 TABLET, FILM COATED ORAL at 11:42

## 2024-11-30 RX ADMIN — OXYCODONE HYDROCHLORIDE 5 MILLIGRAM(S): 30 TABLET ORAL at 20:32
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
## 2024-11-30 NOTE — PROGRESS NOTE ADULT - NS ATTEND AMEND GEN_ALL_CORE FT
Above assessment noted.  The patient was seen and examined by myself with the surgical PA.  The patient is without new events or complaints overnight.  The right lower leg wound is with a wound VAC in place without leak.  no surrounding redness. Wound VAC to be changed Monday.

## 2024-11-30 NOTE — PROGRESS NOTE ADULT - SUBJECTIVE AND OBJECTIVE BOX
SUBJECTIVE / 24H EVENTS:    Pt seen and examined at bedside by the team during rounds. Pt has no new complaints at this time. Pt denies CP, SOB, N/V, fever, chills.     MEDICATIONS  (STANDING):  acetaminophen     Tablet .. 975 milliGRAM(s) Oral every 6 hours  amLODIPine   Tablet 5 milliGRAM(s) Oral daily  benzonatate 100 milliGRAM(s) Oral three times a day  collagenase Ointment 1 Application(s) Topical daily  enoxaparin Injectable 75 milliGRAM(s) SubCutaneous every 12 hours  famotidine    Tablet 20 milliGRAM(s) Oral daily  influenza  Vaccine (HIGH DOSE) 0.5 milliLiter(s) IntraMuscular once  ketorolac   Injectable 15 milliGRAM(s) IV Push every 6 hours  lidocaine   4% Patch 1 Patch Transdermal every 24 hours  lidocaine 1%/epinephrine 1:100,000 Inj 20 milliLiter(s) Local Injection once  methocarbamol 750 milliGRAM(s) Oral every 6 hours  piperacillin/tazobactam IVPB.. 3.375 Gram(s) IV Intermittent every 8 hours  senna 2 Tablet(s) Oral at bedtime  tamsulosin 0.4 milliGRAM(s) Oral at bedtime  vancomycin  IVPB 1250 milliGRAM(s) IV Intermittent every 12 hours    MEDICATIONS  (PRN):  ondansetron Injectable 4 milliGRAM(s) IV Push every 6 hours PRN Nausea  oxyCODONE    IR 5 milliGRAM(s) Oral every 4 hours PRN Moderate Pain (4 - 6)  oxyCODONE    IR 10 milliGRAM(s) Oral every 4 hours PRN Severe Pain (7 - 10)  polyethylene glycol 3350 17 Gram(s) Oral daily PRN Constipation  simethicone 80 milliGRAM(s) Chew three times a day PRN Gas      Vital Signs Last 24 Hrs  T(C): 36.8 (29 Nov 2024 23:56), Max: 36.8 (29 Nov 2024 23:56)  T(F): 98.2 (29 Nov 2024 23:56), Max: 98.2 (29 Nov 2024 23:56)  HR: 70 (29 Nov 2024 23:56) (70 - 98)  BP: 127/72 (29 Nov 2024 23:56) (110/64 - 128/72)  BP(mean): --  RR: 18 (29 Nov 2024 23:56) (16 - 18)  SpO2: 98% (29 Nov 2024 23:56) (92% - 98%)    Parameters below as of 29 Nov 2024 23:56  Patient On (Oxygen Delivery Method): room air      Physical Exam  Constitutional: patient appears comfortable resting in bed, in no apparent distress  Respiratory: respirations are unlabored, no accessory muscle use, no conversational dyspnea  Cardiovascular: regular rate & rhythm  Gastrointestinal: abdomen is soft & non-distended, non-tender, no rebound tenderness / guarding  Musculoskeletal: CABA x 4 spontaneously, extremities are without point tenderness or deformity  Skin: mucous membranes moist, no diaphoresis, pallor, cyanosis or jaundice      I&O's Detail    28 Nov 2024 07:01  -  29 Nov 2024 07:00  --------------------------------------------------------  IN:    Oral Fluid: 700 mL  Total IN: 700 mL    OUT:    Evacuated Tube System (mL): 0 mL    Voided (mL): 1600 mL  Total OUT: 1600 mL    Total NET: -900 mL          LABS:                        9.2    7.36  )-----------( 253      ( 29 Nov 2024 05:33 )             28.4     11-29    140  |  106  |  10.9  ----------------------------<  95  3.5   |  19.0[L]  |  0.62    Ca    8.5      29 Nov 2024 05:33  Phos  2.8     11-29  Mg     1.9     11-29      PTT - ( 29 Nov 2024 05:33 )  PTT:57.9 sec  Urinalysis Basic - ( 29 Nov 2024 05:33 )    Color: x / Appearance: x / SG: x / pH: x  Gluc: 95 mg/dL / Ketone: x  / Bili: x / Urobili: x   Blood: x / Protein: x / Nitrite: x   Leuk Esterase: x / RBC: x / WBC x   Sq Epi: x / Non Sq Epi: x / Bacteria: x

## 2024-12-01 LAB
ANION GAP SERPL CALC-SCNC: 14 MMOL/L — SIGNIFICANT CHANGE UP (ref 5–17)
BASOPHILS # BLD AUTO: 0.13 K/UL — SIGNIFICANT CHANGE UP (ref 0–0.2)
BASOPHILS NFR BLD AUTO: 2.6 % — HIGH (ref 0–2)
BUN SERPL-MCNC: 11.7 MG/DL — SIGNIFICANT CHANGE UP (ref 8–20)
CALCIUM SERPL-MCNC: 8.6 MG/DL — SIGNIFICANT CHANGE UP (ref 8.4–10.5)
CHLORIDE SERPL-SCNC: 106 MMOL/L — SIGNIFICANT CHANGE UP (ref 96–108)
CO2 SERPL-SCNC: 19 MMOL/L — LOW (ref 22–29)
CREAT SERPL-MCNC: 0.62 MG/DL — SIGNIFICANT CHANGE UP (ref 0.5–1.3)
CULTURE RESULTS: SIGNIFICANT CHANGE UP
CULTURE RESULTS: SIGNIFICANT CHANGE UP
EGFR: 104 ML/MIN/1.73M2 — SIGNIFICANT CHANGE UP
EOSINOPHIL # BLD AUTO: 0.31 K/UL — SIGNIFICANT CHANGE UP (ref 0–0.5)
EOSINOPHIL NFR BLD AUTO: 6.2 % — HIGH (ref 0–6)
GIANT PLATELETS BLD QL SMEAR: PRESENT — SIGNIFICANT CHANGE UP
GLUCOSE SERPL-MCNC: 79 MG/DL — SIGNIFICANT CHANGE UP (ref 70–99)
HCT VFR BLD CALC: 31.2 % — LOW (ref 39–50)
HGB BLD-MCNC: 10.1 G/DL — LOW (ref 13–17)
LYMPHOCYTES # BLD AUTO: 1.6 K/UL — SIGNIFICANT CHANGE UP (ref 1–3.3)
LYMPHOCYTES # BLD AUTO: 31.9 % — SIGNIFICANT CHANGE UP (ref 13–44)
MAGNESIUM SERPL-MCNC: 1.9 MG/DL — SIGNIFICANT CHANGE UP (ref 1.6–2.6)
MANUAL SMEAR VERIFICATION: SIGNIFICANT CHANGE UP
MCHC RBC-ENTMCNC: 29.9 PG — SIGNIFICANT CHANGE UP (ref 27–34)
MCHC RBC-ENTMCNC: 32.4 G/DL — SIGNIFICANT CHANGE UP (ref 32–36)
MCV RBC AUTO: 92.3 FL — SIGNIFICANT CHANGE UP (ref 80–100)
MONOCYTES # BLD AUTO: 0.31 K/UL — SIGNIFICANT CHANGE UP (ref 0–0.9)
MONOCYTES NFR BLD AUTO: 6.2 % — SIGNIFICANT CHANGE UP (ref 2–14)
MRSA PCR RESULT.: SIGNIFICANT CHANGE UP
MYELOCYTES NFR BLD: 2.7 % — HIGH (ref 0–0)
NEUTROPHILS # BLD AUTO: 2.39 K/UL — SIGNIFICANT CHANGE UP (ref 1.8–7.4)
NEUTROPHILS NFR BLD AUTO: 47.8 % — SIGNIFICANT CHANGE UP (ref 43–77)
PHOSPHATE SERPL-MCNC: 3.9 MG/DL — SIGNIFICANT CHANGE UP (ref 2.4–4.7)
PLAT MORPH BLD: NORMAL — SIGNIFICANT CHANGE UP
PLATELET # BLD AUTO: 346 K/UL — SIGNIFICANT CHANGE UP (ref 150–400)
POTASSIUM SERPL-MCNC: 3.8 MMOL/L — SIGNIFICANT CHANGE UP (ref 3.5–5.3)
POTASSIUM SERPL-SCNC: 3.8 MMOL/L — SIGNIFICANT CHANGE UP (ref 3.5–5.3)
RBC # BLD: 3.38 M/UL — LOW (ref 4.2–5.8)
RBC # FLD: 14 % — SIGNIFICANT CHANGE UP (ref 10.3–14.5)
RBC BLD AUTO: NORMAL — SIGNIFICANT CHANGE UP
S AUREUS DNA NOSE QL NAA+PROBE: SIGNIFICANT CHANGE UP
SODIUM SERPL-SCNC: 139 MMOL/L — SIGNIFICANT CHANGE UP (ref 135–145)
SPECIMEN SOURCE: SIGNIFICANT CHANGE UP
SPECIMEN SOURCE: SIGNIFICANT CHANGE UP
VARIANT LYMPHS # BLD: 2.6 % — SIGNIFICANT CHANGE UP (ref 0–6)
WBC # BLD: 5 K/UL — SIGNIFICANT CHANGE UP (ref 3.8–10.5)
WBC # FLD AUTO: 5 K/UL — SIGNIFICANT CHANGE UP (ref 3.8–10.5)

## 2024-12-01 PROCEDURE — 99231 SBSQ HOSP IP/OBS SF/LOW 25: CPT

## 2024-12-01 RX ADMIN — LIDOCAINE 1 PATCH: 40 CREAM TOPICAL at 14:23

## 2024-12-01 RX ADMIN — KETOROLAC TROMETHAMINE 15 MILLIGRAM(S): 30 INJECTION INTRAMUSCULAR; INTRAVENOUS at 02:10

## 2024-12-01 RX ADMIN — METHOCARBAMOL 750 MILLIGRAM(S): 500 TABLET, FILM COATED ORAL at 23:20

## 2024-12-01 RX ADMIN — LIDOCAINE 1 PATCH: 40 CREAM TOPICAL at 23:30

## 2024-12-01 RX ADMIN — KETOROLAC TROMETHAMINE 15 MILLIGRAM(S): 30 INJECTION INTRAMUSCULAR; INTRAVENOUS at 23:23

## 2024-12-01 RX ADMIN — Medication 166.67 MILLIGRAM(S): at 02:19

## 2024-12-01 RX ADMIN — PIPERACILLIN SODIUM AND TAZOBACTAM SODIUM 25 GRAM(S): 4; .5 INJECTION, POWDER, LYOPHILIZED, FOR SOLUTION INTRAVENOUS at 23:16

## 2024-12-01 RX ADMIN — ENOXAPARIN SODIUM 75 MILLIGRAM(S): 30 INJECTION SUBCUTANEOUS at 18:18

## 2024-12-01 RX ADMIN — TAMSULOSIN HYDROCHLORIDE 0.4 MILLIGRAM(S): 0.4 CAPSULE ORAL at 23:21

## 2024-12-01 RX ADMIN — ACETAMINOPHEN 500MG 975 MILLIGRAM(S): 500 TABLET, COATED ORAL at 18:16

## 2024-12-01 RX ADMIN — Medication 166.67 MILLIGRAM(S): at 11:38

## 2024-12-01 RX ADMIN — ENOXAPARIN SODIUM 75 MILLIGRAM(S): 30 INJECTION SUBCUTANEOUS at 07:04

## 2024-12-01 RX ADMIN — METHOCARBAMOL 750 MILLIGRAM(S): 500 TABLET, FILM COATED ORAL at 18:18

## 2024-12-01 RX ADMIN — BENZONATATE 100 MILLIGRAM(S): 100 CAPSULE ORAL at 14:38

## 2024-12-01 RX ADMIN — ACETAMINOPHEN 500MG 975 MILLIGRAM(S): 500 TABLET, COATED ORAL at 19:15

## 2024-12-01 RX ADMIN — PIPERACILLIN SODIUM AND TAZOBACTAM SODIUM 25 GRAM(S): 4; .5 INJECTION, POWDER, LYOPHILIZED, FOR SOLUTION INTRAVENOUS at 07:50

## 2024-12-01 RX ADMIN — PIPERACILLIN SODIUM AND TAZOBACTAM SODIUM 25 GRAM(S): 4; .5 INJECTION, POWDER, LYOPHILIZED, FOR SOLUTION INTRAVENOUS at 14:38

## 2024-12-01 RX ADMIN — KETOROLAC TROMETHAMINE 15 MILLIGRAM(S): 30 INJECTION INTRAMUSCULAR; INTRAVENOUS at 19:19

## 2024-12-01 RX ADMIN — ACETAMINOPHEN 500MG 975 MILLIGRAM(S): 500 TABLET, COATED ORAL at 02:10

## 2024-12-01 RX ADMIN — ACETAMINOPHEN 500MG 975 MILLIGRAM(S): 500 TABLET, COATED ORAL at 01:14

## 2024-12-01 RX ADMIN — ACETAMINOPHEN 500MG 975 MILLIGRAM(S): 500 TABLET, COATED ORAL at 23:21

## 2024-12-01 RX ADMIN — Medication 166.67 MILLIGRAM(S): at 23:57

## 2024-12-01 RX ADMIN — FAMOTIDINE 20 MILLIGRAM(S): 20 TABLET, FILM COATED ORAL at 11:49

## 2024-12-01 RX ADMIN — TAMSULOSIN HYDROCHLORIDE 0.4 MILLIGRAM(S): 0.4 CAPSULE ORAL at 01:15

## 2024-12-01 RX ADMIN — METHOCARBAMOL 750 MILLIGRAM(S): 500 TABLET, FILM COATED ORAL at 01:14

## 2024-12-01 RX ADMIN — KETOROLAC TROMETHAMINE 15 MILLIGRAM(S): 30 INJECTION INTRAMUSCULAR; INTRAVENOUS at 01:18

## 2024-12-01 RX ADMIN — KETOROLAC TROMETHAMINE 15 MILLIGRAM(S): 30 INJECTION INTRAMUSCULAR; INTRAVENOUS at 18:19

## 2024-12-01 RX ADMIN — KETOROLAC TROMETHAMINE 15 MILLIGRAM(S): 30 INJECTION INTRAMUSCULAR; INTRAVENOUS at 22:19

## 2024-12-01 RX ADMIN — LIDOCAINE 1 PATCH: 40 CREAM TOPICAL at 01:29

## 2024-12-01 NOTE — PROGRESS NOTE ADULT - NS ATTEND AMEND GEN_ALL_CORE FT
Above assessment noted.  The patient was seen and examined by myself with the surgical PA.  The patient is without new events or complaints overnight.  The wound VAC remains intact without leak. To be removed and changed tomorrow.

## 2024-12-01 NOTE — PHARMACOTHERAPY INTERVENTION NOTE - COMMENTS
Vancomycin dose increased to 1250 mg q12h per population PK. Pre-4th dose level ordered for 11/28 at 11:00
As per policy, ordered a vancomycin trough level for 12/2 AM to assist with further dosing recommendations  
no

## 2024-12-01 NOTE — PROGRESS NOTE ADULT - SUBJECTIVE AND OBJECTIVE BOX
SUBJECTIVE / 24H EVENTS:    Pt seen and examined at bedside by the team during rounds. Pt resting with no acute complaints. Pt denies CP, SOB, N/V, fever, chills.     MEDICATIONS  (STANDING):  acetaminophen     Tablet .. 975 milliGRAM(s) Oral every 6 hours  amLODIPine   Tablet 5 milliGRAM(s) Oral daily  benzonatate 100 milliGRAM(s) Oral three times a day  collagenase Ointment 1 Application(s) Topical daily  enoxaparin Injectable 75 milliGRAM(s) SubCutaneous every 12 hours  famotidine    Tablet 20 milliGRAM(s) Oral daily  influenza  Vaccine (HIGH DOSE) 0.5 milliLiter(s) IntraMuscular once  ketorolac   Injectable 15 milliGRAM(s) IV Push every 6 hours  lidocaine   4% Patch 1 Patch Transdermal every 24 hours  lidocaine 1%/epinephrine 1:100,000 Inj 20 milliLiter(s) Local Injection once  methocarbamol 750 milliGRAM(s) Oral every 6 hours  piperacillin/tazobactam IVPB.. 3.375 Gram(s) IV Intermittent every 8 hours  senna 2 Tablet(s) Oral at bedtime  tamsulosin 0.4 milliGRAM(s) Oral at bedtime  vancomycin  IVPB 1250 milliGRAM(s) IV Intermittent every 12 hours    MEDICATIONS  (PRN):  ondansetron Injectable 4 milliGRAM(s) IV Push every 6 hours PRN Nausea  oxyCODONE    IR 5 milliGRAM(s) Oral every 4 hours PRN Moderate Pain (4 - 6)  oxyCODONE    IR 10 milliGRAM(s) Oral every 4 hours PRN Severe Pain (7 - 10)  polyethylene glycol 3350 17 Gram(s) Oral daily PRN Constipation  simethicone 80 milliGRAM(s) Chew three times a day PRN Gas      Vital Signs Last 24 Hrs  T(C): 36.7 (30 Nov 2024 20:01), Max: 36.8 (30 Nov 2024 04:00)  T(F): 98.1 (30 Nov 2024 20:01), Max: 98.2 (30 Nov 2024 04:00)  HR: 75 (30 Nov 2024 20:01) (72 - 84)  BP: 144/75 (30 Nov 2024 20:01) (113/73 - 144/75)  BP(mean): --  RR: 17 (30 Nov 2024 20:01) (16 - 18)  SpO2: 93% (30 Nov 2024 20:01) (92% - 97%)    Parameters below as of 30 Nov 2024 20:01  Patient On (Oxygen Delivery Method): room air    Physical Exam  Constitutional: patient appears comfortable resting in bed, in no apparent distress  Respiratory: respirations are unlabored, no accessory muscle use, no conversational dyspnea  Cardiovascular: regular rate & rhythm  Gastrointestinal: abdomen is soft & non-distended, non-tender, no rebound tenderness / guarding  Skin: mucous membranes moist, no diaphoresis, pallor, cyanosis or jaundice      I&O's Detail    29 Nov 2024 07:01  -  30 Nov 2024 07:00  --------------------------------------------------------  IN:  Total IN: 0 mL    OUT:    Voided (mL): 400 mL  Total OUT: 400 mL    Total NET: -400 mL          LABS:                        9.1    4.34  )-----------( 292      ( 30 Nov 2024 07:33 )             28.2     11-30    139  |  106  |  8.4  ----------------------------<  92  3.9   |  21.0[L]  |  0.65    Ca    8.2[L]      30 Nov 2024 07:23  Phos  3.6     11-30  Mg     2.0     11-30      PTT - ( 30 Nov 2024 07:29 )  PTT:41.8 sec  Urinalysis Basic - ( 30 Nov 2024 07:23 )    Color: x / Appearance: x / SG: x / pH: x  Gluc: 92 mg/dL / Ketone: x  / Bili: x / Urobili: x   Blood: x / Protein: x / Nitrite: x   Leuk Esterase: x / RBC: x / WBC x   Sq Epi: x / Non Sq Epi: x / Bacteria: x
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
## 2024-12-02 LAB
CULTURE RESULTS: SIGNIFICANT CHANGE UP
HCT VFR BLD CALC: 30.6 % — LOW (ref 39–50)
HGB BLD-MCNC: 10 G/DL — LOW (ref 13–17)
MCHC RBC-ENTMCNC: 29.6 PG — SIGNIFICANT CHANGE UP (ref 27–34)
MCHC RBC-ENTMCNC: 32.7 G/DL — SIGNIFICANT CHANGE UP (ref 32–36)
MCV RBC AUTO: 90.5 FL — SIGNIFICANT CHANGE UP (ref 80–100)
PLATELET # BLD AUTO: 379 K/UL — SIGNIFICANT CHANGE UP (ref 150–400)
RBC # BLD: 3.38 M/UL — LOW (ref 4.2–5.8)
RBC # FLD: 14 % — SIGNIFICANT CHANGE UP (ref 10.3–14.5)
SPECIMEN SOURCE: SIGNIFICANT CHANGE UP
VANCOMYCIN TROUGH SERPL-MCNC: 26.2 UG/ML — CRITICAL HIGH (ref 10–20)
WBC # BLD: 4.54 K/UL — SIGNIFICANT CHANGE UP (ref 3.8–10.5)
WBC # FLD AUTO: 4.54 K/UL — SIGNIFICANT CHANGE UP (ref 3.8–10.5)

## 2024-12-02 PROCEDURE — 74018 RADEX ABDOMEN 1 VIEW: CPT | Mod: 26

## 2024-12-02 PROCEDURE — 99232 SBSQ HOSP IP/OBS MODERATE 35: CPT

## 2024-12-02 PROCEDURE — 93971 EXTREMITY STUDY: CPT | Mod: 26,LT

## 2024-12-02 RX ORDER — AMOXICILLIN/POTASSIUM CLAV 250-125 MG
1 TABLET ORAL EVERY 12 HOURS
Refills: 0 | Status: DISCONTINUED | OUTPATIENT
Start: 2024-12-02 | End: 2024-12-03

## 2024-12-02 RX ADMIN — KETOROLAC TROMETHAMINE 15 MILLIGRAM(S): 30 INJECTION INTRAMUSCULAR; INTRAVENOUS at 12:24

## 2024-12-02 RX ADMIN — ACETAMINOPHEN 500MG 975 MILLIGRAM(S): 500 TABLET, COATED ORAL at 07:39

## 2024-12-02 RX ADMIN — OXYCODONE HYDROCHLORIDE 10 MILLIGRAM(S): 30 TABLET ORAL at 16:37

## 2024-12-02 RX ADMIN — ACETAMINOPHEN 500MG 975 MILLIGRAM(S): 500 TABLET, COATED ORAL at 00:21

## 2024-12-02 RX ADMIN — LIDOCAINE 1 PATCH: 40 CREAM TOPICAL at 21:19

## 2024-12-02 RX ADMIN — OXYCODONE HYDROCHLORIDE 10 MILLIGRAM(S): 30 TABLET ORAL at 21:16

## 2024-12-02 RX ADMIN — METHOCARBAMOL 750 MILLIGRAM(S): 500 TABLET, FILM COATED ORAL at 07:08

## 2024-12-02 RX ADMIN — ACETAMINOPHEN 500MG 975 MILLIGRAM(S): 500 TABLET, COATED ORAL at 18:18

## 2024-12-02 RX ADMIN — TAMSULOSIN HYDROCHLORIDE 0.4 MILLIGRAM(S): 0.4 CAPSULE ORAL at 21:15

## 2024-12-02 RX ADMIN — KETOROLAC TROMETHAMINE 15 MILLIGRAM(S): 30 INJECTION INTRAMUSCULAR; INTRAVENOUS at 13:25

## 2024-12-02 RX ADMIN — PIPERACILLIN SODIUM AND TAZOBACTAM SODIUM 25 GRAM(S): 4; .5 INJECTION, POWDER, LYOPHILIZED, FOR SOLUTION INTRAVENOUS at 14:25

## 2024-12-02 RX ADMIN — ACETAMINOPHEN 500MG 975 MILLIGRAM(S): 500 TABLET, COATED ORAL at 13:25

## 2024-12-02 RX ADMIN — METHOCARBAMOL 750 MILLIGRAM(S): 500 TABLET, FILM COATED ORAL at 12:25

## 2024-12-02 RX ADMIN — AMLODIPINE BESYLATE 5 MILLIGRAM(S): 10 TABLET ORAL at 07:07

## 2024-12-02 RX ADMIN — FAMOTIDINE 20 MILLIGRAM(S): 20 TABLET, FILM COATED ORAL at 12:24

## 2024-12-02 RX ADMIN — KETOROLAC TROMETHAMINE 15 MILLIGRAM(S): 30 INJECTION INTRAMUSCULAR; INTRAVENOUS at 00:23

## 2024-12-02 RX ADMIN — ENOXAPARIN SODIUM 75 MILLIGRAM(S): 30 INJECTION SUBCUTANEOUS at 07:17

## 2024-12-02 RX ADMIN — PIPERACILLIN SODIUM AND TAZOBACTAM SODIUM 25 GRAM(S): 4; .5 INJECTION, POWDER, LYOPHILIZED, FOR SOLUTION INTRAVENOUS at 07:16

## 2024-12-02 RX ADMIN — KETOROLAC TROMETHAMINE 15 MILLIGRAM(S): 30 INJECTION INTRAMUSCULAR; INTRAVENOUS at 18:18

## 2024-12-02 RX ADMIN — METHOCARBAMOL 750 MILLIGRAM(S): 500 TABLET, FILM COATED ORAL at 17:18

## 2024-12-02 RX ADMIN — ENOXAPARIN SODIUM 75 MILLIGRAM(S): 30 INJECTION SUBCUTANEOUS at 17:18

## 2024-12-02 RX ADMIN — ACETAMINOPHEN 500MG 975 MILLIGRAM(S): 500 TABLET, COATED ORAL at 07:09

## 2024-12-02 RX ADMIN — ACETAMINOPHEN 500MG 975 MILLIGRAM(S): 500 TABLET, COATED ORAL at 12:25

## 2024-12-02 RX ADMIN — BENZONATATE 100 MILLIGRAM(S): 100 CAPSULE ORAL at 21:16

## 2024-12-02 RX ADMIN — KETOROLAC TROMETHAMINE 15 MILLIGRAM(S): 30 INJECTION INTRAMUSCULAR; INTRAVENOUS at 21:19

## 2024-12-02 RX ADMIN — ACETAMINOPHEN 500MG 975 MILLIGRAM(S): 500 TABLET, COATED ORAL at 17:18

## 2024-12-02 RX ADMIN — METHOCARBAMOL 750 MILLIGRAM(S): 500 TABLET, FILM COATED ORAL at 21:19

## 2024-12-02 RX ADMIN — OXYCODONE HYDROCHLORIDE 10 MILLIGRAM(S): 30 TABLET ORAL at 15:37

## 2024-12-02 RX ADMIN — LIDOCAINE 1 PATCH: 40 CREAM TOPICAL at 12:02

## 2024-12-02 RX ADMIN — Medication 1 TABLET(S): at 19:26

## 2024-12-02 RX ADMIN — KETOROLAC TROMETHAMINE 15 MILLIGRAM(S): 30 INJECTION INTRAMUSCULAR; INTRAVENOUS at 17:19

## 2024-12-02 RX ADMIN — LIDOCAINE 1 PATCH: 40 CREAM TOPICAL at 08:21

## 2024-12-02 RX ADMIN — OXYCODONE HYDROCHLORIDE 10 MILLIGRAM(S): 30 TABLET ORAL at 22:16

## 2024-12-02 RX ADMIN — KETOROLAC TROMETHAMINE 15 MILLIGRAM(S): 30 INJECTION INTRAMUSCULAR; INTRAVENOUS at 07:14

## 2024-12-02 RX ADMIN — KETOROLAC TROMETHAMINE 15 MILLIGRAM(S): 30 INJECTION INTRAMUSCULAR; INTRAVENOUS at 07:39

## 2024-12-02 NOTE — CHART NOTE - NSCHARTNOTEFT_GEN_A_CORE
WOUND VAC    RLE medial ankle wound vac changed at bedside.  Pt tolerated well without complaint.  Moderate pain with procedure, well controlled with oxycodone.  Good seal obtained.
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
## 2024-12-02 NOTE — PROGRESS NOTE ADULT - SUBJECTIVE AND OBJECTIVE BOX
INTERVAL HPI/OVERNIGHT EVENTS:    Patient evaluated at bedside. No acute distress. No acute events overnight. Pt does not report ambulating, stating he has difficulty getting out of bed on his own. Pain controlled on current regimen. Wound vac output ~50-70cc.     MEDICATIONS  (STANDING):  acetaminophen     Tablet .. 975 milliGRAM(s) Oral every 6 hours  amLODIPine   Tablet 5 milliGRAM(s) Oral daily  benzonatate 100 milliGRAM(s) Oral three times a day  collagenase Ointment 1 Application(s) Topical daily  enoxaparin Injectable 75 milliGRAM(s) SubCutaneous every 12 hours  famotidine    Tablet 20 milliGRAM(s) Oral daily  influenza  Vaccine (HIGH DOSE) 0.5 milliLiter(s) IntraMuscular once  ketorolac   Injectable 15 milliGRAM(s) IV Push every 6 hours  lidocaine   4% Patch 1 Patch Transdermal every 24 hours  lidocaine 1%/epinephrine 1:100,000 Inj 20 milliLiter(s) Local Injection once  methocarbamol 750 milliGRAM(s) Oral every 6 hours  piperacillin/tazobactam IVPB.. 3.375 Gram(s) IV Intermittent every 8 hours  senna 2 Tablet(s) Oral at bedtime  tamsulosin 0.4 milliGRAM(s) Oral at bedtime  vancomycin  IVPB 1250 milliGRAM(s) IV Intermittent every 12 hours    MEDICATIONS  (PRN):  ondansetron Injectable 4 milliGRAM(s) IV Push every 6 hours PRN Nausea  oxyCODONE    IR 10 milliGRAM(s) Oral every 4 hours PRN Severe Pain (7 - 10)  oxyCODONE    IR 5 milliGRAM(s) Oral every 4 hours PRN Moderate Pain (4 - 6)  polyethylene glycol 3350 17 Gram(s) Oral daily PRN Constipation  simethicone 80 milliGRAM(s) Chew three times a day PRN Gas    Vital Signs Last 24 Hrs  T(C): 36.8 (02 Dec 2024 08:20), Max: 37.1 (01 Dec 2024 20:30)  T(F): 98.2 (02 Dec 2024 08:20), Max: 98.8 (01 Dec 2024 20:30)  HR: 71 (02 Dec 2024 08:20) (71 - 79)  BP: 135/79 (02 Dec 2024 08:20) (117/66 - 148/79)  BP(mean): --  RR: 17 (02 Dec 2024 08:20) (17 - 18)  SpO2: 95% (02 Dec 2024 08:20) (94% - 98%)    Parameters below as of 02 Dec 2024 08:20  Patient On (Oxygen Delivery Method): room air    Physical Exam  Constitutional: patient appears comfortable resting in bed, in no apparent distress  Respiratory: respirations are unlabored, no accessory muscle use, no conversational dyspnea  Cardiovascular: regular rate & rhythm  Gastrointestinal: soft & non-distended, non-tender, no rebound tenderness / guarding  Extremities: RLE wound vac in place, serosanguineous ~50-70cc drainage; mild erythematous, no edema, no purulent drainage or bullae formation; LLE mild peripheral edema; motor and sensory function intact bilaterally     I&O's Detail    01 Dec 2024 07:01  -  02 Dec 2024 07:00  --------------------------------------------------------  IN:    Oral Fluid: 240 mL  Total IN: 240 mL    OUT:    Voided (mL): 450 mL  Total OUT: 450 mL    Total NET: -210 mL    LABS:                        10.0   4.54  )-----------( 379      ( 02 Dec 2024 05:37 )             30.6     12-01    139  |  106  |  11.7  ----------------------------<  79  3.8   |  19.0[L]  |  0.62    Ca    8.6      01 Dec 2024 05:56  Phos  3.9     12-01  Mg     1.9     12-01    Urinalysis Basic - ( 01 Dec 2024 05:56 )    Color: x / Appearance: x / SG: x / pH: x  Gluc: 79 mg/dL / Ketone: x  / Bili: x / Urobili: x   Blood: x / Protein: x / Nitrite: x   Leuk Esterase: x / RBC: x / WBC x   Sq Epi: x / Non Sq Epi: x / Bacteria: x INTERVAL HPI/OVERNIGHT EVENTS:    Patient evaluated at bedside. No acute distress. Pt does not report ambulating, stating he has difficulty getting out of bed on his own. Pain controlled on current regimen. Wound vac output ~50-70cc. Complaining of swelling to LLE.      MEDICATIONS  (STANDING):  acetaminophen     Tablet .. 975 milliGRAM(s) Oral every 6 hours  amLODIPine   Tablet 5 milliGRAM(s) Oral daily  benzonatate 100 milliGRAM(s) Oral three times a day  collagenase Ointment 1 Application(s) Topical daily  enoxaparin Injectable 75 milliGRAM(s) SubCutaneous every 12 hours  famotidine    Tablet 20 milliGRAM(s) Oral daily  influenza  Vaccine (HIGH DOSE) 0.5 milliLiter(s) IntraMuscular once  ketorolac   Injectable 15 milliGRAM(s) IV Push every 6 hours  lidocaine   4% Patch 1 Patch Transdermal every 24 hours  lidocaine 1%/epinephrine 1:100,000 Inj 20 milliLiter(s) Local Injection once  methocarbamol 750 milliGRAM(s) Oral every 6 hours  piperacillin/tazobactam IVPB.. 3.375 Gram(s) IV Intermittent every 8 hours  senna 2 Tablet(s) Oral at bedtime  tamsulosin 0.4 milliGRAM(s) Oral at bedtime  vancomycin  IVPB 1250 milliGRAM(s) IV Intermittent every 12 hours    MEDICATIONS  (PRN):  ondansetron Injectable 4 milliGRAM(s) IV Push every 6 hours PRN Nausea  oxyCODONE    IR 10 milliGRAM(s) Oral every 4 hours PRN Severe Pain (7 - 10)  oxyCODONE    IR 5 milliGRAM(s) Oral every 4 hours PRN Moderate Pain (4 - 6)  polyethylene glycol 3350 17 Gram(s) Oral daily PRN Constipation  simethicone 80 milliGRAM(s) Chew three times a day PRN Gas    Vital Signs Last 24 Hrs  T(C): 36.8 (02 Dec 2024 08:20), Max: 37.1 (01 Dec 2024 20:30)  T(F): 98.2 (02 Dec 2024 08:20), Max: 98.8 (01 Dec 2024 20:30)  HR: 71 (02 Dec 2024 08:20) (71 - 79)  BP: 135/79 (02 Dec 2024 08:20) (117/66 - 148/79)  BP(mean): --  RR: 17 (02 Dec 2024 08:20) (17 - 18)  SpO2: 95% (02 Dec 2024 08:20) (94% - 98%)    Parameters below as of 02 Dec 2024 08:20  Patient On (Oxygen Delivery Method): room air    Physical Exam  Constitutional: patient appears comfortable resting in bed, in no apparent distress  Respiratory: respirations are unlabored, no accessory muscle use, no conversational dyspnea  Cardiovascular: regular rate & rhythm  Gastrointestinal: soft & non-distended, non-tender, no rebound tenderness / guarding  Extremities: RLE wound vac in place, serosanguineous ~50-70cc drainage; mild erythematous, no edema, no purulent drainage or bullae formation; LLE mild peripheral edema; motor and sensory function intact bilaterally     I&O's Detail    01 Dec 2024 07:01  -  02 Dec 2024 07:00  --------------------------------------------------------  IN:    Oral Fluid: 240 mL  Total IN: 240 mL    OUT:    Voided (mL): 450 mL  Total OUT: 450 mL    Total NET: -210 mL    LABS:                        10.0   4.54  )-----------( 379      ( 02 Dec 2024 05:37 )             30.6     12-01    139  |  106  |  11.7  ----------------------------<  79  3.8   |  19.0[L]  |  0.62    Ca    8.6      01 Dec 2024 05:56  Phos  3.9     12-01  Mg     1.9     12-01    Urinalysis Basic - ( 01 Dec 2024 05:56 )    Color: x / Appearance: x / SG: x / pH: x  Gluc: 79 mg/dL / Ketone: x  / Bili: x / Urobili: x   Blood: x / Protein: x / Nitrite: x   Leuk Esterase: x / RBC: x / WBC x   Sq Epi: x / Non Sq Epi: x / Bacteria: x

## 2024-12-03 DIAGNOSIS — I26.99 OTHER PULMONARY EMBOLISM WITHOUT ACUTE COR PULMONALE: ICD-10-CM

## 2024-12-03 LAB
ACANTHOCYTES BLD QL SMEAR: SLIGHT — SIGNIFICANT CHANGE UP
ANION GAP SERPL CALC-SCNC: 17 MMOL/L — SIGNIFICANT CHANGE UP (ref 5–17)
ANION GAP SERPL CALC-SCNC: 17 MMOL/L — SIGNIFICANT CHANGE UP (ref 5–17)
ANION GAP SERPL CALC-SCNC: 18 MMOL/L — HIGH (ref 5–17)
ANION GAP SERPL CALC-SCNC: 20 MMOL/L — HIGH (ref 5–17)
ANISOCYTOSIS BLD QL: SLIGHT — SIGNIFICANT CHANGE UP
APTT BLD: 38.8 SEC — HIGH (ref 24.5–35.6)
APTT BLD: 39.1 SEC — HIGH (ref 24.5–35.6)
APTT BLD: 58.2 SEC — HIGH (ref 24.5–35.6)
BASOPHILS # BLD AUTO: 0 K/UL — SIGNIFICANT CHANGE UP (ref 0–0.2)
BASOPHILS NFR BLD AUTO: 0 % — SIGNIFICANT CHANGE UP (ref 0–2)
BUN SERPL-MCNC: 12 MG/DL — SIGNIFICANT CHANGE UP (ref 8–20)
BUN SERPL-MCNC: 12.4 MG/DL — SIGNIFICANT CHANGE UP (ref 8–20)
BUN SERPL-MCNC: 14 MG/DL — SIGNIFICANT CHANGE UP (ref 8–20)
BUN SERPL-MCNC: 14.9 MG/DL — SIGNIFICANT CHANGE UP (ref 8–20)
BURR CELLS BLD QL SMEAR: PRESENT — SIGNIFICANT CHANGE UP
CALCIUM SERPL-MCNC: 11 MG/DL — HIGH (ref 8.4–10.5)
CALCIUM SERPL-MCNC: 8.6 MG/DL — SIGNIFICANT CHANGE UP (ref 8.4–10.5)
CALCIUM SERPL-MCNC: 9 MG/DL — SIGNIFICANT CHANGE UP (ref 8.4–10.5)
CALCIUM SERPL-MCNC: 9.2 MG/DL — SIGNIFICANT CHANGE UP (ref 8.4–10.5)
CHLORIDE SERPL-SCNC: 102 MMOL/L — SIGNIFICANT CHANGE UP (ref 96–108)
CHLORIDE SERPL-SCNC: 103 MMOL/L — SIGNIFICANT CHANGE UP (ref 96–108)
CHLORIDE SERPL-SCNC: 103 MMOL/L — SIGNIFICANT CHANGE UP (ref 96–108)
CHLORIDE SERPL-SCNC: 104 MMOL/L — SIGNIFICANT CHANGE UP (ref 96–108)
CO2 SERPL-SCNC: 15 MMOL/L — LOW (ref 22–29)
CO2 SERPL-SCNC: 16 MMOL/L — LOW (ref 22–29)
CO2 SERPL-SCNC: 18 MMOL/L — LOW (ref 22–29)
CO2 SERPL-SCNC: 18 MMOL/L — LOW (ref 22–29)
CREAT SERPL-MCNC: 0.69 MG/DL — SIGNIFICANT CHANGE UP (ref 0.5–1.3)
CREAT SERPL-MCNC: 0.77 MG/DL — SIGNIFICANT CHANGE UP (ref 0.5–1.3)
CREAT SERPL-MCNC: 0.85 MG/DL — SIGNIFICANT CHANGE UP (ref 0.5–1.3)
CREAT SERPL-MCNC: 0.86 MG/DL — SIGNIFICANT CHANGE UP (ref 0.5–1.3)
EGFR: 101 ML/MIN/1.73M2 — SIGNIFICANT CHANGE UP
EGFR: 94 ML/MIN/1.73M2 — SIGNIFICANT CHANGE UP
EGFR: 95 ML/MIN/1.73M2 — SIGNIFICANT CHANGE UP
EGFR: 98 ML/MIN/1.73M2 — SIGNIFICANT CHANGE UP
EOSINOPHIL # BLD AUTO: 0.45 K/UL — SIGNIFICANT CHANGE UP (ref 0–0.5)
EOSINOPHIL NFR BLD AUTO: 2.5 % — SIGNIFICANT CHANGE UP (ref 0–6)
GAS PNL BLDA: SIGNIFICANT CHANGE UP
GIANT PLATELETS BLD QL SMEAR: PRESENT — SIGNIFICANT CHANGE UP
GLUCOSE BLDC GLUCOMTR-MCNC: 120 MG/DL — HIGH (ref 70–99)
GLUCOSE BLDC GLUCOMTR-MCNC: 184 MG/DL — HIGH (ref 70–99)
GLUCOSE BLDC GLUCOMTR-MCNC: 247 MG/DL — HIGH (ref 70–99)
GLUCOSE BLDC GLUCOMTR-MCNC: 261 MG/DL — HIGH (ref 70–99)
GLUCOSE SERPL-MCNC: 148 MG/DL — HIGH (ref 70–99)
GLUCOSE SERPL-MCNC: 234 MG/DL — HIGH (ref 70–99)
GLUCOSE SERPL-MCNC: 246 MG/DL — HIGH (ref 70–99)
GLUCOSE SERPL-MCNC: 252 MG/DL — HIGH (ref 70–99)
HCT VFR BLD CALC: 31.6 % — LOW (ref 39–50)
HCT VFR BLD CALC: 31.7 % — LOW (ref 39–50)
HCT VFR BLD CALC: 31.8 % — LOW (ref 39–50)
HCT VFR BLD CALC: 34.3 % — LOW (ref 39–50)
HCT VFR BLD CALC: 37.8 % — LOW (ref 39–50)
HGB BLD-MCNC: 10.3 G/DL — LOW (ref 13–17)
HGB BLD-MCNC: 10.9 G/DL — LOW (ref 13–17)
HGB BLD-MCNC: 11.6 G/DL — LOW (ref 13–17)
INR BLD: 1.09 RATIO — SIGNIFICANT CHANGE UP (ref 0.85–1.16)
INR BLD: 1.1 RATIO — SIGNIFICANT CHANGE UP (ref 0.85–1.16)
LACTATE SERPL-SCNC: 7.9 MMOL/L — CRITICAL HIGH (ref 0.5–2)
LDH SERPL L TO P-CCNC: 357 U/L — HIGH (ref 98–192)
LYMPHOCYTES # BLD AUTO: 24.6 % — SIGNIFICANT CHANGE UP (ref 13–44)
LYMPHOCYTES # BLD AUTO: 4.45 K/UL — HIGH (ref 1–3.3)
MAGNESIUM SERPL-MCNC: 1.8 MG/DL — SIGNIFICANT CHANGE UP (ref 1.8–2.6)
MAGNESIUM SERPL-MCNC: 1.9 MG/DL — SIGNIFICANT CHANGE UP (ref 1.8–2.6)
MAGNESIUM SERPL-MCNC: 2 MG/DL — SIGNIFICANT CHANGE UP (ref 1.6–2.6)
MANUAL SMEAR VERIFICATION: SIGNIFICANT CHANGE UP
MCHC RBC-ENTMCNC: 29.1 PG — SIGNIFICANT CHANGE UP (ref 27–34)
MCHC RBC-ENTMCNC: 29.4 PG — SIGNIFICANT CHANGE UP (ref 27–34)
MCHC RBC-ENTMCNC: 29.5 PG — SIGNIFICANT CHANGE UP (ref 27–34)
MCHC RBC-ENTMCNC: 29.5 PG — SIGNIFICANT CHANGE UP (ref 27–34)
MCHC RBC-ENTMCNC: 29.7 PG — SIGNIFICANT CHANGE UP (ref 27–34)
MCHC RBC-ENTMCNC: 30.7 G/DL — LOW (ref 32–36)
MCHC RBC-ENTMCNC: 31.8 G/DL — LOW (ref 32–36)
MCHC RBC-ENTMCNC: 32.4 G/DL — SIGNIFICANT CHANGE UP (ref 32–36)
MCHC RBC-ENTMCNC: 32.5 G/DL — SIGNIFICANT CHANGE UP (ref 32–36)
MCHC RBC-ENTMCNC: 32.6 G/DL — SIGNIFICANT CHANGE UP (ref 32–36)
MCV RBC AUTO: 90.8 FL — SIGNIFICANT CHANGE UP (ref 80–100)
MCV RBC AUTO: 90.9 FL — SIGNIFICANT CHANGE UP (ref 80–100)
MCV RBC AUTO: 91.1 FL — SIGNIFICANT CHANGE UP (ref 80–100)
MCV RBC AUTO: 93 FL — SIGNIFICANT CHANGE UP (ref 80–100)
MCV RBC AUTO: 95 FL — SIGNIFICANT CHANGE UP (ref 80–100)
METAMYELOCYTES # FLD: 0.8 % — HIGH (ref 0–0)
MONOCYTES # BLD AUTO: 0.92 K/UL — HIGH (ref 0–0.9)
MONOCYTES NFR BLD AUTO: 5.1 % — SIGNIFICANT CHANGE UP (ref 2–14)
MYELOCYTES NFR BLD: 3.4 % — HIGH (ref 0–0)
NEUTROPHILS # BLD AUTO: 8.89 K/UL — HIGH (ref 1.8–7.4)
NEUTROPHILS NFR BLD AUTO: 45.8 % — SIGNIFICANT CHANGE UP (ref 43–77)
NEUTS BAND # BLD: 3.4 % — SIGNIFICANT CHANGE UP (ref 0–8)
NT-PROBNP SERPL-SCNC: 327 PG/ML — HIGH (ref 0–300)
NT-PROBNP SERPL-SCNC: 349 PG/ML — HIGH (ref 0–300)
NT-PROBNP SERPL-SCNC: 359 PG/ML — HIGH (ref 0–300)
OVALOCYTES BLD QL SMEAR: SLIGHT — SIGNIFICANT CHANGE UP
PHOSPHATE SERPL-MCNC: 3.6 MG/DL — SIGNIFICANT CHANGE UP (ref 2.4–4.7)
PHOSPHATE SERPL-MCNC: 4.3 MG/DL — SIGNIFICANT CHANGE UP (ref 2.4–4.7)
PHOSPHATE SERPL-MCNC: 5.8 MG/DL — HIGH (ref 2.4–4.7)
PLAT MORPH BLD: NORMAL — SIGNIFICANT CHANGE UP
PLATELET # BLD AUTO: 399 K/UL — SIGNIFICANT CHANGE UP (ref 150–400)
PLATELET # BLD AUTO: 420 K/UL — HIGH (ref 150–400)
PLATELET # BLD AUTO: 441 K/UL — HIGH (ref 150–400)
PLATELET # BLD AUTO: 480 K/UL — HIGH (ref 150–400)
PLATELET # BLD AUTO: 522 K/UL — HIGH (ref 150–400)
POIKILOCYTOSIS BLD QL AUTO: SIGNIFICANT CHANGE UP
POLYCHROMASIA BLD QL SMEAR: SIGNIFICANT CHANGE UP
POTASSIUM SERPL-MCNC: 3.3 MMOL/L — LOW (ref 3.5–5.3)
POTASSIUM SERPL-MCNC: 3.3 MMOL/L — LOW (ref 3.5–5.3)
POTASSIUM SERPL-MCNC: 3.5 MMOL/L — SIGNIFICANT CHANGE UP (ref 3.5–5.3)
POTASSIUM SERPL-MCNC: 4.3 MMOL/L — SIGNIFICANT CHANGE UP (ref 3.5–5.3)
POTASSIUM SERPL-SCNC: 3.3 MMOL/L — LOW (ref 3.5–5.3)
POTASSIUM SERPL-SCNC: 3.3 MMOL/L — LOW (ref 3.5–5.3)
POTASSIUM SERPL-SCNC: 3.5 MMOL/L — SIGNIFICANT CHANGE UP (ref 3.5–5.3)
POTASSIUM SERPL-SCNC: 4.3 MMOL/L — SIGNIFICANT CHANGE UP (ref 3.5–5.3)
PROMYELOCYTES # FLD: 0.8 % — HIGH (ref 0–0)
PROTHROM AB SERPL-ACNC: 12.6 SEC — SIGNIFICANT CHANGE UP (ref 9.9–13.4)
PROTHROM AB SERPL-ACNC: 12.7 SEC — SIGNIFICANT CHANGE UP (ref 9.9–13.4)
RBC # BLD: 3.47 M/UL — LOW (ref 4.2–5.8)
RBC # BLD: 3.49 M/UL — LOW (ref 4.2–5.8)
RBC # BLD: 3.5 M/UL — LOW (ref 4.2–5.8)
RBC # BLD: 3.69 M/UL — LOW (ref 4.2–5.8)
RBC # BLD: 3.98 M/UL — LOW (ref 4.2–5.8)
RBC # FLD: 14 % — SIGNIFICANT CHANGE UP (ref 10.3–14.5)
RBC # FLD: 14.1 % — SIGNIFICANT CHANGE UP (ref 10.3–14.5)
RBC # FLD: 14.2 % — SIGNIFICANT CHANGE UP (ref 10.3–14.5)
RBC # FLD: 14.2 % — SIGNIFICANT CHANGE UP (ref 10.3–14.5)
RBC # FLD: 14.4 % — SIGNIFICANT CHANGE UP (ref 10.3–14.5)
RBC BLD AUTO: ABNORMAL
SODIUM SERPL-SCNC: 136 MMOL/L — SIGNIFICANT CHANGE UP (ref 135–145)
SODIUM SERPL-SCNC: 137 MMOL/L — SIGNIFICANT CHANGE UP (ref 135–145)
SODIUM SERPL-SCNC: 138 MMOL/L — SIGNIFICANT CHANGE UP (ref 135–145)
SODIUM SERPL-SCNC: 139 MMOL/L — SIGNIFICANT CHANGE UP (ref 135–145)
TROPONIN T, HIGH SENSITIVITY RESULT: 22 NG/L — SIGNIFICANT CHANGE UP (ref 0–51)
TROPONIN T, HIGH SENSITIVITY RESULT: 48 NG/L — SIGNIFICANT CHANGE UP (ref 0–51)
TROPONIN T, HIGH SENSITIVITY RESULT: 527 NG/L — HIGH (ref 0–51)
VARIANT LYMPHS # BLD: 13.6 % — HIGH (ref 0–6)
WBC # BLD: 11.58 K/UL — HIGH (ref 3.8–10.5)
WBC # BLD: 14.47 K/UL — HIGH (ref 3.8–10.5)
WBC # BLD: 15.99 K/UL — HIGH (ref 3.8–10.5)
WBC # BLD: 18.07 K/UL — HIGH (ref 3.8–10.5)
WBC # BLD: 5.25 K/UL — SIGNIFICANT CHANGE UP (ref 3.8–10.5)
WBC # FLD AUTO: 11.58 K/UL — HIGH (ref 3.8–10.5)
WBC # FLD AUTO: 14.47 K/UL — HIGH (ref 3.8–10.5)
WBC # FLD AUTO: 15.99 K/UL — HIGH (ref 3.8–10.5)
WBC # FLD AUTO: 18.07 K/UL — HIGH (ref 3.8–10.5)
WBC # FLD AUTO: 5.25 K/UL — SIGNIFICANT CHANGE UP (ref 3.8–10.5)

## 2024-12-03 PROCEDURE — 99291 CRITICAL CARE FIRST HOUR: CPT

## 2024-12-03 PROCEDURE — 99223 1ST HOSP IP/OBS HIGH 75: CPT

## 2024-12-03 PROCEDURE — 99292 CRITICAL CARE ADDL 30 MIN: CPT

## 2024-12-03 PROCEDURE — 31500 INSERT EMERGENCY AIRWAY: CPT

## 2024-12-03 PROCEDURE — 93306 TTE W/DOPPLER COMPLETE: CPT | Mod: 26

## 2024-12-03 PROCEDURE — 71045 X-RAY EXAM CHEST 1 VIEW: CPT | Mod: 26,77

## 2024-12-03 PROCEDURE — 93306 TTE W/DOPPLER COMPLETE: CPT | Mod: 26,77

## 2024-12-03 PROCEDURE — 93010 ELECTROCARDIOGRAM REPORT: CPT

## 2024-12-03 PROCEDURE — 71045 X-RAY EXAM CHEST 1 VIEW: CPT | Mod: 26

## 2024-12-03 PROCEDURE — 99232 SBSQ HOSP IP/OBS MODERATE 35: CPT

## 2024-12-03 PROCEDURE — 93970 EXTREMITY STUDY: CPT | Mod: 26

## 2024-12-03 RX ORDER — EPINEPHRINE 1 MG/ML(1)
0.03 AMPUL (ML) INJECTION
Qty: 4 | Refills: 0 | Status: DISCONTINUED | OUTPATIENT
Start: 2024-12-03 | End: 2024-12-04

## 2024-12-03 RX ORDER — ACETAMINOPHEN 500MG 500 MG/1
1000 TABLET, COATED ORAL EVERY 6 HOURS
Refills: 0 | Status: DISCONTINUED | OUTPATIENT
Start: 2024-12-03 | End: 2024-12-04

## 2024-12-03 RX ORDER — HEPARIN SODIUM,PORCINE 1000/ML
VIAL (ML) INJECTION
Qty: 25000 | Refills: 0 | Status: DISCONTINUED | OUTPATIENT
Start: 2024-12-03 | End: 2024-12-04

## 2024-12-03 RX ORDER — CHLORHEXIDINE GLUCONATE 1.2 MG/ML
1 RINSE ORAL
Qty: 0 | Refills: 0 | DISCHARGE
Start: 2024-12-03

## 2024-12-03 RX ORDER — SODIUM BICARBONATE 84 MG/ML
0 INJECTION, SOLUTION INTRAVENOUS
Qty: 0 | Refills: 0 | DISCHARGE
Start: 2024-12-03

## 2024-12-03 RX ORDER — PANTOPRAZOLE SODIUM 40 MG/1
40 TABLET, DELAYED RELEASE ORAL
Qty: 0 | Refills: 0 | DISCHARGE
Start: 2024-12-03

## 2024-12-03 RX ORDER — AMPICILLIN AND SULBACTAM 1; .5 G/1; G/1
3 INJECTION, POWDER, FOR SOLUTION INTRAVENOUS EVERY 6 HOURS
Refills: 0 | Status: DISCONTINUED | OUTPATIENT
Start: 2024-12-03 | End: 2024-12-04

## 2024-12-03 RX ORDER — FENTANYL 12 UG/H
50 PATCH, EXTENDED RELEASE TRANSDERMAL ONCE
Refills: 0 | Status: DISCONTINUED | OUTPATIENT
Start: 2024-12-03 | End: 2024-12-03

## 2024-12-03 RX ORDER — CHLORHEXIDINE GLUCONATE 1.2 MG/ML
15 RINSE ORAL EVERY 12 HOURS
Refills: 0 | Status: DISCONTINUED | OUTPATIENT
Start: 2024-12-03 | End: 2024-12-04

## 2024-12-03 RX ORDER — EPINEPHRINE 1 MG/ML(1)
0 AMPUL (ML) INJECTION
Qty: 0 | Refills: 0 | DISCHARGE
Start: 2024-12-03

## 2024-12-03 RX ORDER — VASOPRESSIN 20 [USP'U]/ML
0.06 INJECTION, SOLUTION INTRAVENOUS
Qty: 40 | Refills: 0 | Status: DISCONTINUED | OUTPATIENT
Start: 2024-12-03 | End: 2024-12-04

## 2024-12-03 RX ORDER — CHLORHEXIDINE GLUCONATE 1.2 MG/ML
15 RINSE ORAL
Qty: 0 | Refills: 0 | DISCHARGE
Start: 2024-12-03

## 2024-12-03 RX ORDER — AMPICILLIN AND SULBACTAM 1; .5 G/1; G/1
INJECTION, POWDER, FOR SOLUTION INTRAVENOUS
Refills: 0 | Status: DISCONTINUED | OUTPATIENT
Start: 2024-12-03 | End: 2024-12-04

## 2024-12-03 RX ORDER — AMPICILLIN AND SULBACTAM 1; .5 G/1; G/1
3 INJECTION, POWDER, FOR SOLUTION INTRAVENOUS ONCE
Refills: 0 | Status: COMPLETED | OUTPATIENT
Start: 2024-12-03 | End: 2024-12-03

## 2024-12-03 RX ORDER — HEPARIN SODIUM,PORCINE 1000/ML
0 VIAL (ML) INJECTION
Qty: 0 | Refills: 0 | DISCHARGE
Start: 2024-12-03

## 2024-12-03 RX ORDER — POTASSIUM CHLORIDE 600 MG/1
20 TABLET, EXTENDED RELEASE ORAL
Refills: 0 | Status: COMPLETED | OUTPATIENT
Start: 2024-12-03 | End: 2024-12-03

## 2024-12-03 RX ORDER — KETAMINE HCL 50MG/ML(1)
0 SYRINGE (ML) INTRAVENOUS
Qty: 0 | Refills: 0 | DISCHARGE
Start: 2024-12-03

## 2024-12-03 RX ORDER — FENTANYL 12 UG/H
2 PATCH, EXTENDED RELEASE TRANSDERMAL
Qty: 2500 | Refills: 0 | Status: DISCONTINUED | OUTPATIENT
Start: 2024-12-03 | End: 2024-12-04

## 2024-12-03 RX ORDER — FENTANYL 12 UG/H
100 PATCH, EXTENDED RELEASE TRANSDERMAL
Refills: 0 | Status: DISCONTINUED | OUTPATIENT
Start: 2024-12-03 | End: 2024-12-03

## 2024-12-03 RX ORDER — NOREPINEPHRINE BITARTRATE 1 MG/ML
0.05 INJECTION, SOLUTION, CONCENTRATE INTRAVENOUS
Qty: 8 | Refills: 0 | Status: DISCONTINUED | OUTPATIENT
Start: 2024-12-03 | End: 2024-12-04

## 2024-12-03 RX ORDER — NOREPINEPHRINE BITARTRATE 1 MG/ML
0 INJECTION, SOLUTION, CONCENTRATE INTRAVENOUS
Qty: 0 | Refills: 0 | DISCHARGE
Start: 2024-12-03

## 2024-12-03 RX ORDER — ONDANSETRON HYDROCHLORIDE 4 MG/1
4 TABLET, FILM COATED ORAL ONCE
Refills: 0 | Status: COMPLETED | OUTPATIENT
Start: 2024-12-03 | End: 2024-12-03

## 2024-12-03 RX ORDER — VASOPRESSIN 20 [USP'U]/ML
0.04 INJECTION, SOLUTION INTRAVENOUS
Qty: 40 | Refills: 0 | Status: DISCONTINUED | OUTPATIENT
Start: 2024-12-03 | End: 2024-12-03

## 2024-12-03 RX ORDER — ALTEPLASE 2.2 MG/2ML
50 INJECTION, POWDER, LYOPHILIZED, FOR SOLUTION INTRAVENOUS ONCE
Refills: 0 | Status: COMPLETED | OUTPATIENT
Start: 2024-12-03 | End: 2024-12-03

## 2024-12-03 RX ORDER — PANTOPRAZOLE SODIUM 40 MG/1
40 TABLET, DELAYED RELEASE ORAL DAILY
Refills: 0 | Status: DISCONTINUED | OUTPATIENT
Start: 2024-12-03 | End: 2024-12-04

## 2024-12-03 RX ORDER — SODIUM BICARBONATE 84 MG/ML
0.3 INJECTION, SOLUTION INTRAVENOUS
Qty: 150 | Refills: 0 | Status: DISCONTINUED | OUTPATIENT
Start: 2024-12-03 | End: 2024-12-04

## 2024-12-03 RX ORDER — FENTANYL 12 UG/H
50 PATCH, EXTENDED RELEASE TRANSDERMAL
Refills: 0 | Status: DISCONTINUED | OUTPATIENT
Start: 2024-12-03 | End: 2024-12-04

## 2024-12-03 RX ORDER — FENTANYL 12 UG/H
100 PATCH, EXTENDED RELEASE TRANSDERMAL ONCE
Refills: 0 | Status: DISCONTINUED | OUTPATIENT
Start: 2024-12-03 | End: 2024-12-03

## 2024-12-03 RX ORDER — CHLORHEXIDINE GLUCONATE 1.2 MG/ML
1 RINSE ORAL DAILY
Refills: 0 | Status: DISCONTINUED | OUTPATIENT
Start: 2024-12-03 | End: 2024-12-04

## 2024-12-03 RX ORDER — KETAMINE HCL 50MG/ML(1)
0.25 SYRINGE (ML) INTRAVENOUS
Qty: 1000 | Refills: 0 | Status: DISCONTINUED | OUTPATIENT
Start: 2024-12-03 | End: 2024-12-04

## 2024-12-03 RX ADMIN — Medication 8.42 MICROGRAM(S)/KG/MIN: at 09:15

## 2024-12-03 RX ADMIN — ALTEPLASE 25 MILLIGRAM(S): 2.2 INJECTION, POWDER, LYOPHILIZED, FOR SOLUTION INTRAVENOUS at 09:08

## 2024-12-03 RX ADMIN — Medication 4 MILLIGRAM(S): at 09:30

## 2024-12-03 RX ADMIN — Medication 8.42 MICROGRAM(S)/KG/MIN: at 20:40

## 2024-12-03 RX ADMIN — FENTANYL 50 MICROGRAM(S): 12 PATCH, EXTENDED RELEASE TRANSDERMAL at 12:54

## 2024-12-03 RX ADMIN — LIDOCAINE 1 PATCH: 40 CREAM TOPICAL at 12:27

## 2024-12-03 RX ADMIN — FENTANYL 15 MICROGRAM(S)/KG/HR: 12 PATCH, EXTENDED RELEASE TRANSDERMAL at 20:39

## 2024-12-03 RX ADMIN — KETOROLAC TROMETHAMINE 15 MILLIGRAM(S): 30 INJECTION INTRAMUSCULAR; INTRAVENOUS at 05:43

## 2024-12-03 RX ADMIN — VASOPRESSIN 9 UNIT(S)/MIN: 20 INJECTION, SOLUTION INTRAVENOUS at 18:20

## 2024-12-03 RX ADMIN — FENTANYL 50 MICROGRAM(S): 12 PATCH, EXTENDED RELEASE TRANSDERMAL at 12:23

## 2024-12-03 RX ADMIN — NOREPINEPHRINE BITARTRATE 7.01 MICROGRAM(S)/KG/MIN: 1 INJECTION, SOLUTION, CONCENTRATE INTRAVENOUS at 10:15

## 2024-12-03 RX ADMIN — AMPICILLIN AND SULBACTAM 200 GRAM(S): 1; .5 INJECTION, POWDER, FOR SOLUTION INTRAVENOUS at 12:55

## 2024-12-03 RX ADMIN — Medication 1 TABLET(S): at 05:43

## 2024-12-03 RX ADMIN — VASOPRESSIN 6 UNIT(S)/MIN: 20 INJECTION, SOLUTION INTRAVENOUS at 09:15

## 2024-12-03 RX ADMIN — Medication 1.5 MG/KG/HR: at 10:13

## 2024-12-03 RX ADMIN — FENTANYL 50 MICROGRAM(S): 12 PATCH, EXTENDED RELEASE TRANSDERMAL at 13:13

## 2024-12-03 RX ADMIN — CHLORHEXIDINE GLUCONATE 15 MILLILITER(S): 1.2 RINSE ORAL at 17:26

## 2024-12-03 RX ADMIN — PANTOPRAZOLE SODIUM 40 MILLIGRAM(S): 40 TABLET, DELAYED RELEASE ORAL at 11:44

## 2024-12-03 RX ADMIN — Medication 1300 UNIT(S)/HR: at 18:24

## 2024-12-03 RX ADMIN — FENTANYL 100 MICROGRAM(S): 12 PATCH, EXTENDED RELEASE TRANSDERMAL at 10:05

## 2024-12-03 RX ADMIN — FENTANYL 15 MICROGRAM(S)/KG/HR: 12 PATCH, EXTENDED RELEASE TRANSDERMAL at 10:12

## 2024-12-03 RX ADMIN — Medication 6: at 13:23

## 2024-12-03 RX ADMIN — BENZONATATE 100 MILLIGRAM(S): 100 CAPSULE ORAL at 05:46

## 2024-12-03 RX ADMIN — Medication 2 MILLIGRAM(S): at 10:00

## 2024-12-03 RX ADMIN — FENTANYL 50 MICROGRAM(S): 12 PATCH, EXTENDED RELEASE TRANSDERMAL at 10:30

## 2024-12-03 RX ADMIN — AMPICILLIN AND SULBACTAM 200 GRAM(S): 1; .5 INJECTION, POWDER, FOR SOLUTION INTRAVENOUS at 18:13

## 2024-12-03 RX ADMIN — ACETAMINOPHEN 500MG 400 MILLIGRAM(S): 500 TABLET, COATED ORAL at 17:45

## 2024-12-03 RX ADMIN — ONDANSETRON HYDROCHLORIDE 4 MILLIGRAM(S): 4 TABLET, FILM COATED ORAL at 09:00

## 2024-12-03 RX ADMIN — ENOXAPARIN SODIUM 75 MILLIGRAM(S): 30 INJECTION SUBCUTANEOUS at 05:42

## 2024-12-03 RX ADMIN — Medication 25 GRAM(S): at 16:27

## 2024-12-03 RX ADMIN — POTASSIUM CHLORIDE 50 MILLIEQUIVALENT(S): 600 TABLET, EXTENDED RELEASE ORAL at 16:27

## 2024-12-03 RX ADMIN — CHLORHEXIDINE GLUCONATE 1 APPLICATION(S): 1.2 RINSE ORAL at 12:54

## 2024-12-03 RX ADMIN — FENTANYL 100 MICROGRAM(S): 12 PATCH, EXTENDED RELEASE TRANSDERMAL at 12:23

## 2024-12-03 RX ADMIN — METHOCARBAMOL 750 MILLIGRAM(S): 500 TABLET, FILM COATED ORAL at 05:43

## 2024-12-03 RX ADMIN — KETOROLAC TROMETHAMINE 15 MILLIGRAM(S): 30 INJECTION INTRAMUSCULAR; INTRAVENOUS at 06:43

## 2024-12-03 RX ADMIN — Medication 4: at 17:26

## 2024-12-03 RX ADMIN — ACETAMINOPHEN 500MG 975 MILLIGRAM(S): 500 TABLET, COATED ORAL at 06:42

## 2024-12-03 RX ADMIN — ACETAMINOPHEN 500MG 975 MILLIGRAM(S): 500 TABLET, COATED ORAL at 05:42

## 2024-12-03 RX ADMIN — POTASSIUM CHLORIDE 50 MILLIEQUIVALENT(S): 600 TABLET, EXTENDED RELEASE ORAL at 20:40

## 2024-12-03 RX ADMIN — AMPICILLIN AND SULBACTAM 200 GRAM(S): 1; .5 INJECTION, POWDER, FOR SOLUTION INTRAVENOUS at 23:16

## 2024-12-03 RX ADMIN — SODIUM BICARBONATE 150 MEQ/KG/HR: 84 INJECTION, SOLUTION INTRAVENOUS at 23:12

## 2024-12-03 RX ADMIN — Medication 2: at 22:13

## 2024-12-03 RX ADMIN — Medication 4 MILLIGRAM(S): at 09:45

## 2024-12-03 RX ADMIN — AMLODIPINE BESYLATE 5 MILLIGRAM(S): 10 TABLET ORAL at 05:43

## 2024-12-03 RX ADMIN — Medication 1300 UNIT(S)/HR: at 11:45

## 2024-12-03 RX ADMIN — Medication 1.87 MG/KG/HR: at 12:25

## 2024-12-03 RX ADMIN — POTASSIUM CHLORIDE 50 MILLIEQUIVALENT(S): 600 TABLET, EXTENDED RELEASE ORAL at 18:12

## 2024-12-03 NOTE — PROCEDURE NOTE - NSPROCDETAILS_GEN_ALL_CORE
guidewire recovered/lumen(s) aspirated and flushed/sterile dressing applied
positive blood return obtained via catheter/connected to a pressurized flush line/sutured in place
patient pre-oxygenated, tube inserted, placement confirmed
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
## 2024-12-03 NOTE — DIETITIAN INITIAL EVALUATION ADULT - ORAL INTAKE PTA/DIET HISTORY
Pt was on 2BRK this morning, s/p RRT for SOB and chest pain with abnormal EKG, transferred to ICU for concern for massive PE and Rt HF. Now intubated and requiring multiple pressures. Unable to obtain hx from pt at this time. Current weight 165 lbs. Last documented BM - yesterday. Previously on Regular diet with Ensure Max BID for protein supplementation prior to RRT, now NPO. Hyperglycemia noted today, likely stress induced, no h/o DM per chart, a1c 5.0%.

## 2024-12-03 NOTE — CONSULT NOTE ADULT - NS ATTEND AMEND GEN_ALL_CORE FT
Patient seen and examined while intubated and sedated. Critically ill, on multiple pressors and nitric oxide. Likely source acute massive pulmonary embolism given right heart strain, Seaman's sign, and shock with right-sided heart failure. S/p tPA with improvement of oxygentation, now down to 40% FIo2. Remains on high pressor load.     #Obstructive shock  #Acute PE  #Acute hypoxemic respiratory failure    Plan  continue IV heparin  trend lactate  If unable to clear lactate and/or pressors requirements do not improve, recommend ECMO for hemodynamic support  Will obtain CT-A PE protocol when patient is stable for transport    Critically ill. Prognosis is guarded.

## 2024-12-03 NOTE — CHART NOTE - NSCHARTNOTEFT_GEN_A_CORE
Rapid response called for patient due to sudden onset SOB and chest pain. Patient was seen and examined at bedside in severe visible distress. Patient with labored breathing, air hunger, gripped at his chest. Patient was put on high flow. Initial vitals showed hypotension sbp in 80s, tachycardia to 120s and hypoxia. 1 L bolus of NS was given. EKG was performed and showed abnormal ekg changes with left axis deviation. Bedside fast was performed showing possible right heart strain. Rapid response called for patient due to sudden onset SOB and chest pain. Patient was seen and examined at bedside in severe visible distress. Patient with labored breathing, air hunger, gripped at his chest. Patient was put on high flow. Initial vitals showed hypotension sbp in 80s, tachycardia to 120s and hypoxia. 1 L bolus of NS was given. EKG was performed and showed abnormal ekg changes with left axis deviation. Bedside fast was performed showing possible right heart strain. central line and A lines were placed at beside by ICU PAs. Patient was started on levo and vaso. It was decided to upgrade patient to ICU. labs were ordered, ABG, lactacte, trops, BNP, CBC, BMP. Chest xray done at bedside on the floor. stat echo was called. perc team called. Patient was then transferred to ICU. Intubated in ICU. Rapid response called for patient due to sudden onset SOB and chest pain. Patient was seen and examined at bedside in severe visible distress. Patient with labored breathing, air hunger, gripping at his chest.   Patient was put on NRB. Initial vitals showed hypotension sbp in 80s, tachycardia to 120s and hypoxia.   1 L bolus of NS was given. EKG was performed and showed abnormal ekg changes with left axis deviation. Bedside POCUS was performed showing possible right heart strain, RV>LV, IVC plethoric.   It was decided to upgrade patient to ICU. Labs were ordered: lactacte, trops, BNP, CBC, BMP. Chest xray done at bedside on the floor.  Central line and Arterial lines were placed at beside by ICU PAs. Decision made to stabilize patient's hemodynamics in SICU prior to intubation, with clincial concern for massive PE and Rt HF  Patient was started on levo and vaso.    stat echo was called. perc team called. Patient was then transferred to ICU.

## 2024-12-03 NOTE — PROCEDURE NOTE - NSPOSTCAREGUIDE_GEN_A_CORE
Care for catheter as per unit/ICU protocols
Care for catheter as per unit/ICU protocols
Verbal/written post procedure instructions were given to patient/caregiver/Instructed patient/caregiver regarding signs and symptoms of infection
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
## 2024-12-03 NOTE — CONSULT NOTE ADULT - PROBLEM SELECTOR RECOMMENDATION 9
- s/p TPA for high risk PE.   - TTE showed evidence of McConnells sigh with moderate pulmonary HTN.   - would recommend repeat TTE after TPA is completed in about an hour.   - would repeat B/L LE US to evaluate for resolution of thrombus in proximal femoral vein   - c/w heparin drip s/p TPA. No bolus  - once hemodynamically stable, will need CTA PE to evaluate for residual thrombus burden.   - if hemodynamics remains unstable, consideration for ECMO as per interventionalist, Dr. Galvan.
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
## 2024-12-03 NOTE — DISCHARGE NOTE PROVIDER - NSDCCPCAREPLAN_GEN_ALL_CORE_FT
PRINCIPAL DISCHARGE DIAGNOSIS  Diagnosis: Acute massive pulmonary embolism  Assessment and Plan of Treatment: Patient to be transferred to [facility] for advanced cardiac care.

## 2024-12-03 NOTE — PROGRESS NOTE ADULT - SUBJECTIVE AND OBJECTIVE BOX
Luis remains critically ill, requiring multiple vasopressors and nitric oxide therapy due a massive PE causing right heart strain.     INTERVAL EVENTS:    [ ] Due to altered mental status/intubation, subjective information were not able to be obtained from the patient. History was obtained, to the extent possible, from review of the chart and collateral sources of information.    OBJECTIVE:    VITALS:  Vital Signs Last 24 Hrs  T(C): 37.5 (03 Dec 2024 20:45), Max: 38.5 (03 Dec 2024 18:45)  T(F): 99.5 (03 Dec 2024 20:45), Max: 101.3 (03 Dec 2024 18:45)  HR: 89 (03 Dec 2024 20:45) (75 - 136)  BP: 86/75 (03 Dec 2024 19:00) (81/70 - 138/80)  BP(mean): 80 (03 Dec 2024 19:00) (69 - 90)  RR: 30 (03 Dec 2024 20:45) (13 - 31)  SpO2: 100% (03 Dec 2024 20:45) (89% - 100%)    Parameters below as of 03 Dec 2024 20:00  Patient On (Oxygen Delivery Method): ventilator    O2 Concentration (%): 40  Mode: AC/ CMV (Assist Control/ Continuous Mandatory Ventilation)  RR (machine): 30  TV (machine): 400  FiO2: 40  PEEP: 6  ITime: 0.6  MAP: 10  PIP: 20    I&O's Summary    02 Dec 2024 07:01  -  03 Dec 2024 07:00  --------------------------------------------------------  IN: 790 mL / OUT: 657 mL / NET: 133 mL    03 Dec 2024 07:01  -  03 Dec 2024 21:15  --------------------------------------------------------  IN: 1478.7 mL / OUT: 185 mL / NET: 1293.7 mL        PHYSICAL EXAM:    NEURO  Sedated     RESPIRATORY  Exam:  Good air entry  Mechanical Ventilation: Mode: AC/ CMV (Assist Control/ Continuous Mandatory Ventilation), RR (machine): 30, RR (patient): 30, TV (machine): 400, FiO2: 40, PEEP: 6, ITime: 0.6, MAP: 10, PIP: 20    GI/NUTRITION  Exam:  NPO  Soft, ND        ACCESS DEVICES:  [x] Necessity of urinary, arterial, and venous catheters discussed      LABS:                        10.3   11.58 )-----------( 441      ( 03 Dec 2024 17:17 )             31.8   12-03    137  |  102  |  14.9  ----------------------------<  234[H]  3.3[L]   |  18.0[L]  |  0.77    Ca    9.0      03 Dec 2024 17:17  Phos  3.6     12-03  Mg     1.9     12-03      CAPILLARY BLOOD GLUCOSE      POCT Blood Glucose.: 247 mg/dL (03 Dec 2024 17:17)  POCT Blood Glucose.: 261 mg/dL (03 Dec 2024 13:16)  POCT Blood Glucose.: 120 mg/dL (03 Dec 2024 07:47)      MEDICATIONS:   MEDICATIONS  (STANDING):  ampicillin/sulbactam  IVPB      ampicillin/sulbactam  IVPB 3 Gram(s) IV Intermittent every 6 hours  chlorhexidine 0.12% Liquid 15 milliLiter(s) Oral Mucosa every 12 hours  chlorhexidine 2% Cloths 1 Application(s) Topical daily  collagenase Ointment 1 Application(s) Topical daily  EPINEPHrine    Infusion 0.03 MICROgram(s)/kG/Min (8.42 mL/Hr) IV Continuous <Continuous>  fentaNYL   Infusion. 2 MICROgram(s)/kG/Hr (15 mL/Hr) IV Continuous <Continuous>  heparin  Infusion.  Unit(s)/Hr (13 mL/Hr) IV Continuous <Continuous>  influenza  Vaccine (HIGH DOSE) 0.5 milliLiter(s) IntraMuscular once  insulin lispro (ADMELOG) corrective regimen sliding scale   SubCutaneous every 4 hours  ketamine Infusion. 0.25 mG/kG/Hr (1.87 mL/Hr) IV Continuous <Continuous>  lidocaine   4% Patch 1 Patch Transdermal every 24 hours  lidocaine 1%/epinephrine 1:100,000 Inj 20 milliLiter(s) Local Injection once  midazolam Infusion 0.02 mG/kG/Hr (1.5 mL/Hr) IV Continuous <Continuous>  norepinephrine Infusion 0.05 MICROgram(s)/kG/Min (7.01 mL/Hr) IV Continuous <Continuous>  pantoprazole  Injectable 40 milliGRAM(s) IV Push daily  vasopressin Infusion 0.06 Unit(s)/Min (9 mL/Hr) IV Continuous <Continuous>    MEDICATIONS  (PRN):  acetaminophen   IVPB .. 1000 milliGRAM(s) IV Intermittent every 6 hours PRN Mild Pain (1 - 3), Moderate Pain (4 - 6), Severe Pain (7 - 10)  fentaNYL    Injectable 50 MICROGram(s) IV Push every 2 hours PRN breakthrough / vent compliance   Luis remains critically ill, requiring multiple vasopressors and nitric oxide therapy due a massive PE causing right heart strain.     INTERVAL EVENTS:  Due to intubation, subjective information were not able to be obtained from the patient. History was obtained, to the extent possible, from review of the chart and collateral sources of information.    OBJECTIVE:    VITALS:  Vital Signs Last 24 Hrs  T(C): 37.5 (03 Dec 2024 20:45), Max: 38.5 (03 Dec 2024 18:45)  T(F): 99.5 (03 Dec 2024 20:45), Max: 101.3 (03 Dec 2024 18:45)  HR: 89 (03 Dec 2024 20:45) (75 - 136)  BP: 86/75 (03 Dec 2024 19:00) (81/70 - 138/80)  BP(mean): 80 (03 Dec 2024 19:00) (69 - 90)  RR: 30 (03 Dec 2024 20:45) (13 - 31)  SpO2: 100% (03 Dec 2024 20:45) (89% - 100%)    Parameters below as of 03 Dec 2024 20:00  Patient On (Oxygen Delivery Method): ventilator    O2 Concentration (%): 40  Mode: AC/ CMV (Assist Control/ Continuous Mandatory Ventilation)  RR (machine): 30  TV (machine): 400  FiO2: 40  PEEP: 6  ITime: 0.6  MAP: 10  PIP: 20    I&O's Summary    02 Dec 2024 07:01  -  03 Dec 2024 07:00  --------------------------------------------------------  IN: 790 mL / OUT: 657 mL / NET: 133 mL    03 Dec 2024 07:01  -  03 Dec 2024 21:15  --------------------------------------------------------  IN: 1478.7 mL / OUT: 185 mL / NET: 1293.7 mL        PHYSICAL EXAM:    NEURO  Sedated     RESPIRATORY  Exam:  Good air entry  Mechanical Ventilation: Mode: AC/ CMV (Assist Control/ Continuous Mandatory Ventilation), RR (machine): 30, RR (patient): 30, TV (machine): 400, FiO2: 40, PEEP: 6, ITime: 0.6, MAP: 10, PIP: 20    GI/NUTRITION  Exam:  NPO  Soft, ND        ACCESS DEVICES:  [x] Necessity of urinary, arterial, and venous catheters discussed      LABS:                        10.3   11.58 )-----------( 441      ( 03 Dec 2024 17:17 )             31.8   12-03    137  |  102  |  14.9  ----------------------------<  234[H]  3.3[L]   |  18.0[L]  |  0.77    Ca    9.0      03 Dec 2024 17:17  Phos  3.6     12-03  Mg     1.9     12-03      CAPILLARY BLOOD GLUCOSE      POCT Blood Glucose.: 247 mg/dL (03 Dec 2024 17:17)  POCT Blood Glucose.: 261 mg/dL (03 Dec 2024 13:16)  POCT Blood Glucose.: 120 mg/dL (03 Dec 2024 07:47)      MEDICATIONS:   MEDICATIONS  (STANDING):  ampicillin/sulbactam  IVPB      ampicillin/sulbactam  IVPB 3 Gram(s) IV Intermittent every 6 hours  chlorhexidine 0.12% Liquid 15 milliLiter(s) Oral Mucosa every 12 hours  chlorhexidine 2% Cloths 1 Application(s) Topical daily  collagenase Ointment 1 Application(s) Topical daily  EPINEPHrine    Infusion 0.03 MICROgram(s)/kG/Min (8.42 mL/Hr) IV Continuous <Continuous>  fentaNYL   Infusion. 2 MICROgram(s)/kG/Hr (15 mL/Hr) IV Continuous <Continuous>  heparin  Infusion.  Unit(s)/Hr (13 mL/Hr) IV Continuous <Continuous>  influenza  Vaccine (HIGH DOSE) 0.5 milliLiter(s) IntraMuscular once  insulin lispro (ADMELOG) corrective regimen sliding scale   SubCutaneous every 4 hours  ketamine Infusion. 0.25 mG/kG/Hr (1.87 mL/Hr) IV Continuous <Continuous>  lidocaine   4% Patch 1 Patch Transdermal every 24 hours  lidocaine 1%/epinephrine 1:100,000 Inj 20 milliLiter(s) Local Injection once  midazolam Infusion 0.02 mG/kG/Hr (1.5 mL/Hr) IV Continuous <Continuous>  norepinephrine Infusion 0.05 MICROgram(s)/kG/Min (7.01 mL/Hr) IV Continuous <Continuous>  pantoprazole  Injectable 40 milliGRAM(s) IV Push daily  vasopressin Infusion 0.06 Unit(s)/Min (9 mL/Hr) IV Continuous <Continuous>    MEDICATIONS  (PRN):  acetaminophen   IVPB .. 1000 milliGRAM(s) IV Intermittent every 6 hours PRN Mild Pain (1 - 3), Moderate Pain (4 - 6), Severe Pain (7 - 10)  fentaNYL    Injectable 50 MICROGram(s) IV Push every 2 hours PRN breakthrough / vent compliance
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
## 2024-12-03 NOTE — DIETITIAN INITIAL EVALUATION ADULT - ADD RECOMMEND
Monitor initiation/tolerance of nutrition  Monitor nutrition related labs, weight trends, BMS and I/Os  Follow clinical course
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
## 2024-12-03 NOTE — DIETITIAN INITIAL EVALUATION ADULT - ENTERAL
If pt to remain intubated, start enteral nutrition as soon as medically feasible  Start Vital 1.5 @ 10 cc/hr and increase by 10 cc q 4 hrs until goal rate 70 cc/hr x 18 hrs (1260 ml, 1890 kcals, 86 g pro, 958 ml free water, > 100% RDIs vitamins/minerals)  Additional free water per medical teams discretion

## 2024-12-03 NOTE — PROCEDURE NOTE - NSINDICATIONS_GEN_A_CORE
critical illness/emergency venous access/hemodynamic monitoring/hypertonic/irritant infusion
arterial puncture to obtain ABG's/blood sampling/cannulation purposes/critical patient
critical patient/mental status change/respiratory distress/respiratory failure
devitalized or nonviable tissue at the level of:

## 2024-12-03 NOTE — DIETITIAN INITIAL EVALUATION ADULT - PERTINENT MEDS FT
MEDICATIONS  (STANDING):  EPINEPHrine    Infusion 0.03 MICROgram(s)/kG/Min (8.42 mL/Hr) IV Continuous <Continuous>  fentaNYL   Infusion. 2 MICROgram(s)/kG/Hr (15 mL/Hr) IV Continuous <Continuous>  insulin lispro (ADMELOG) corrective regimen sliding scale   SubCutaneous every 4 hours  ketamine Infusion. 0.25 mG/kG/Hr (1.87 mL/Hr) IV Continuous <Continuous>  norepinephrine Infusion 0.05 MICROgram(s)/kG/Min (7.01 mL/Hr) IV Continuous <Continuous>  pantoprazole  Injectable 40 milliGRAM(s) IV Push daily  vasopressin Infusion 0.04 Unit(s)/Min (6 mL/Hr) IV Continuous <Continuous>    MEDICATIONS  (PRN):  fentaNYL    Injectable 50 MICROGram(s) IV Push every 2 hours PRN breakthrough / vent compliance

## 2024-12-03 NOTE — PROGRESS NOTE ADULT - REASON FOR ADMISSION
RLE Cellulitis and wound
PE with Right heart strain
RLE Cellulitis and Wound
RLE Cellulitis and wound
LIVE cellulits

## 2024-12-03 NOTE — DISCHARGE NOTE PROVIDER - NSDCADMDATE_GEN_ALL_CORE_FT
12/30/2019         RE: Franklin Mejia  [address]  [place] [address]        Dear Colleague,    Thank you for referring your patient, Franklin Mejia, to the [facility]. Please see a copy of my visit note below.    Chief Complaint   Patient presents with     Contact Lens Follow Up       Contact lenses dispensed    Leann Whitten, Optometric Tech       OBJECTIVE: See Ophthalmology exam     ASSESSMENT:    ICD-10-CM    1. Contact lens/glasses fitting Z46.0    2. Myopia of both eyes H52.13    3. Regular astigmatism of both eyes H52.223       PLAN:  Patient Instructions   New trial of Soflens Toric contact lenses.   If adequate comfort/vision with contact lenses, we can finalize the prescription. If not, return for contact lens follow-up appointment.     Haris Campbell O.D.  [address] [place]  [address]    [phone]                Again, thank you for allowing me to participate in the care of your patient.        Sincerely,        Haris Campbell OD    
26-Nov-2024 20:16

## 2024-12-03 NOTE — DISCHARGE NOTE PROVIDER - NSFOLLOWUPCLINICS_GEN_ALL_ED_FT
[facility] Acute Care Surgery  Acute Care Surgery  [address] [place] [address]  Phone: [phone]  Fax:
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
## 2024-12-03 NOTE — DIETITIAN INITIAL EVALUATION ADULT - PERTINENT LABORATORY DATA
12-03    136  |  103  |  12.4  ----------------------------<  246[H]  4.3   |  15.0[L]  |  0.86    Ca    11.0[H]      03 Dec 2024 09:00  Phos  5.8     12-03  Mg     2.0     12-03    POCT Blood Glucose.: 120 mg/dL (12-03-24 @ 07:47)  A1C with Estimated Average Glucose Result: 5.0 % (11-29-24 @ 11:59)

## 2024-12-04 ENCOUNTER — INPATIENT (INPATIENT)
Facility: HOSPITAL | Age: 69
LOS: 8 days | Discharge: ANOTHER HOSPITAL | DRG: 208 | End: 2024-12-13
Attending: HOSPITALIST | Admitting: INTERNAL MEDICINE
Payer: MEDICAID

## 2024-12-04 VITALS — HEART RATE: 82 BPM | HEIGHT: 66 IN | RESPIRATION RATE: 30 BRPM | OXYGEN SATURATION: 100 % | WEIGHT: 165.57 LBS

## 2024-12-04 VITALS — OXYGEN SATURATION: 100 % | HEART RATE: 98 BPM

## 2024-12-04 DIAGNOSIS — R57.0 CARDIOGENIC SHOCK: ICD-10-CM

## 2024-12-04 DIAGNOSIS — R09.89 OTHER SPECIFIED SYMPTOMS AND SIGNS INVOLVING THE CIRCULATORY AND RESPIRATORY SYSTEMS: ICD-10-CM

## 2024-12-04 DIAGNOSIS — Z87.81 PERSONAL HISTORY OF (HEALED) TRAUMATIC FRACTURE: Chronic | ICD-10-CM

## 2024-12-04 LAB
A1C WITH ESTIMATED AVERAGE GLUCOSE RESULT: 5 % — SIGNIFICANT CHANGE UP (ref 4–5.6)
ADD ON TEST-SPECIMEN IN LAB: SIGNIFICANT CHANGE UP
ALBUMIN SERPL ELPH-MCNC: 2.9 G/DL — LOW (ref 3.3–5)
ALBUMIN SERPL ELPH-MCNC: 3 G/DL — LOW (ref 3.3–5)
ALP SERPL-CCNC: 116 U/L — SIGNIFICANT CHANGE UP (ref 40–120)
ALP SERPL-CCNC: 126 U/L — HIGH (ref 40–120)
ALT FLD-CCNC: 58 U/L — HIGH (ref 10–45)
ALT FLD-CCNC: 59 U/L — HIGH (ref 10–45)
ANION GAP SERPL CALC-SCNC: 13 MMOL/L — SIGNIFICANT CHANGE UP (ref 5–17)
ANION GAP SERPL CALC-SCNC: 17 MMOL/L — SIGNIFICANT CHANGE UP (ref 5–17)
ANION GAP SERPL CALC-SCNC: 17 MMOL/L — SIGNIFICANT CHANGE UP (ref 5–17)
APPEARANCE UR: ABNORMAL
APTT BLD: 66 SEC — HIGH (ref 24.5–35.6)
APTT BLD: 77.3 SEC — HIGH (ref 24.5–35.6)
APTT BLD: 86.2 SEC — HIGH (ref 24.5–35.6)
AST SERPL-CCNC: 64 U/L — HIGH (ref 10–40)
AST SERPL-CCNC: 72 U/L — HIGH (ref 10–40)
B-OH-BUTYR SERPL-SCNC: 0.1 MMOL/L — SIGNIFICANT CHANGE UP
BACTERIA # UR AUTO: NEGATIVE /HPF — SIGNIFICANT CHANGE UP
BASE EXCESS BLDMV CALC-SCNC: -1.7 MMOL/L — SIGNIFICANT CHANGE UP (ref -3–3)
BASE EXCESS BLDMV CALC-SCNC: -3.6 MMOL/L — LOW (ref -3–3)
BASOPHILS # BLD AUTO: 0.07 K/UL — SIGNIFICANT CHANGE UP (ref 0–0.2)
BASOPHILS NFR BLD AUTO: 0.8 % — SIGNIFICANT CHANGE UP (ref 0–2)
BILIRUB SERPL-MCNC: 0.6 MG/DL — SIGNIFICANT CHANGE UP (ref 0.2–1.2)
BILIRUB SERPL-MCNC: 0.8 MG/DL — SIGNIFICANT CHANGE UP (ref 0.2–1.2)
BILIRUB UR-MCNC: ABNORMAL
BLD GP AB SCN SERPL QL: NEGATIVE — SIGNIFICANT CHANGE UP
BUN SERPL-MCNC: 14 MG/DL — SIGNIFICANT CHANGE UP (ref 7–23)
BUN SERPL-MCNC: 15 MG/DL — SIGNIFICANT CHANGE UP (ref 8–20)
BUN SERPL-MCNC: 16 MG/DL — SIGNIFICANT CHANGE UP (ref 7–23)
CALCIUM SERPL-MCNC: 8.2 MG/DL — LOW (ref 8.4–10.5)
CALCIUM SERPL-MCNC: 8.4 MG/DL — SIGNIFICANT CHANGE UP (ref 8.4–10.5)
CALCIUM SERPL-MCNC: 8.7 MG/DL — SIGNIFICANT CHANGE UP (ref 8.4–10.5)
CAST: 3 /LPF — SIGNIFICANT CHANGE UP (ref 0–4)
CHLORIDE SERPL-SCNC: 105 MMOL/L — SIGNIFICANT CHANGE UP (ref 96–108)
CHLORIDE SERPL-SCNC: 105 MMOL/L — SIGNIFICANT CHANGE UP (ref 96–108)
CHLORIDE SERPL-SCNC: 106 MMOL/L — SIGNIFICANT CHANGE UP (ref 96–108)
CHOLEST SERPL-MCNC: 105 MG/DL — SIGNIFICANT CHANGE UP
CK SERPL-CCNC: 87 U/L — SIGNIFICANT CHANGE UP (ref 30–200)
CO2 BLDMV-SCNC: 22 MMOL/L — SIGNIFICANT CHANGE UP (ref 21–29)
CO2 BLDMV-SCNC: 23 MMOL/L — SIGNIFICANT CHANGE UP (ref 21–29)
CO2 SERPL-SCNC: 15 MMOL/L — LOW (ref 22–29)
CO2 SERPL-SCNC: 15 MMOL/L — LOW (ref 22–31)
CO2 SERPL-SCNC: 17 MMOL/L — LOW (ref 22–31)
COLOR SPEC: ABNORMAL
CREAT SERPL-MCNC: 0.7 MG/DL — SIGNIFICANT CHANGE UP (ref 0.5–1.3)
CREAT SERPL-MCNC: 0.73 MG/DL — SIGNIFICANT CHANGE UP (ref 0.5–1.3)
CREAT SERPL-MCNC: 0.77 MG/DL — SIGNIFICANT CHANGE UP (ref 0.5–1.3)
DIFF PNL FLD: ABNORMAL
EGFR: 100 ML/MIN/1.73M2 — SIGNIFICANT CHANGE UP
EGFR: 98 ML/MIN/1.73M2 — SIGNIFICANT CHANGE UP
EGFR: 99 ML/MIN/1.73M2 — SIGNIFICANT CHANGE UP
EOSINOPHIL # BLD AUTO: 0.05 K/UL — SIGNIFICANT CHANGE UP (ref 0–0.5)
EOSINOPHIL NFR BLD AUTO: 0.6 % — SIGNIFICANT CHANGE UP (ref 0–6)
ESTIMATED AVERAGE GLUCOSE: 97 MG/DL — SIGNIFICANT CHANGE UP (ref 68–114)
FLUAV AG NPH QL: SIGNIFICANT CHANGE UP
FLUBV AG NPH QL: SIGNIFICANT CHANGE UP
GAS PNL BLDA: SIGNIFICANT CHANGE UP
GAS PNL BLDMV: SIGNIFICANT CHANGE UP
GAS PNL BLDMV: SIGNIFICANT CHANGE UP
GAS PNL BLDV: SIGNIFICANT CHANGE UP
GLUCOSE BLDC GLUCOMTR-MCNC: 108 MG/DL — HIGH (ref 70–99)
GLUCOSE BLDC GLUCOMTR-MCNC: 126 MG/DL — HIGH (ref 70–99)
GLUCOSE BLDC GLUCOMTR-MCNC: 212 MG/DL — HIGH (ref 70–99)
GLUCOSE SERPL-MCNC: 144 MG/DL — HIGH (ref 70–99)
GLUCOSE SERPL-MCNC: 284 MG/DL — HIGH (ref 70–99)
GLUCOSE SERPL-MCNC: 297 MG/DL — HIGH (ref 70–99)
GLUCOSE UR QL: NEGATIVE MG/DL — SIGNIFICANT CHANGE UP
GRAM STN FLD: ABNORMAL
HCO3 BLDMV-SCNC: 21 MMOL/L — SIGNIFICANT CHANGE UP (ref 20–28)
HCO3 BLDMV-SCNC: 22 MMOL/L — SIGNIFICANT CHANGE UP (ref 20–28)
HCT VFR BLD CALC: 30.8 % — LOW (ref 39–50)
HCT VFR BLD CALC: 31.6 % — LOW (ref 39–50)
HDLC SERPL-MCNC: 22 MG/DL — LOW
HGB BLD-MCNC: 9.8 G/DL — LOW (ref 13–17)
HGB BLD-MCNC: 9.9 G/DL — LOW (ref 13–17)
HOROWITZ INDEX BLDMV+IHG-RTO: 40 — SIGNIFICANT CHANGE UP
IMM GRANULOCYTES NFR BLD AUTO: 4 % — HIGH (ref 0–0.9)
KETONES UR-MCNC: NEGATIVE MG/DL — SIGNIFICANT CHANGE UP
LEUKOCYTE ESTERASE UR-ACNC: ABNORMAL
LIPID PNL WITH DIRECT LDL SERPL: 61 MG/DL — SIGNIFICANT CHANGE UP
LYMPHOCYTES # BLD AUTO: 1.67 K/UL — SIGNIFICANT CHANGE UP (ref 1–3.3)
LYMPHOCYTES # BLD AUTO: 19 % — SIGNIFICANT CHANGE UP (ref 13–44)
MAGNESIUM SERPL-MCNC: 1.8 MG/DL — SIGNIFICANT CHANGE UP (ref 1.6–2.6)
MAGNESIUM SERPL-MCNC: 1.8 MG/DL — SIGNIFICANT CHANGE UP (ref 1.6–2.6)
MAGNESIUM SERPL-MCNC: 1.9 MG/DL — SIGNIFICANT CHANGE UP (ref 1.6–2.6)
MANUAL SMEAR VERIFICATION: SIGNIFICANT CHANGE UP
MCHC RBC-ENTMCNC: 29.3 PG — SIGNIFICANT CHANGE UP (ref 27–34)
MCHC RBC-ENTMCNC: 29.7 PG — SIGNIFICANT CHANGE UP (ref 27–34)
MCHC RBC-ENTMCNC: 31 G/DL — LOW (ref 32–36)
MCHC RBC-ENTMCNC: 32.1 G/DL — SIGNIFICANT CHANGE UP (ref 32–36)
MCV RBC AUTO: 92.5 FL — SIGNIFICANT CHANGE UP (ref 80–100)
MCV RBC AUTO: 94.3 FL — SIGNIFICANT CHANGE UP (ref 80–100)
MONOCYTES # BLD AUTO: 1.05 K/UL — HIGH (ref 0–0.9)
MONOCYTES NFR BLD AUTO: 11.9 % — SIGNIFICANT CHANGE UP (ref 2–14)
MRSA PCR RESULT.: SIGNIFICANT CHANGE UP
NEUTROPHILS # BLD AUTO: 5.61 K/UL — SIGNIFICANT CHANGE UP (ref 1.8–7.4)
NEUTROPHILS NFR BLD AUTO: 63.7 % — SIGNIFICANT CHANGE UP (ref 43–77)
NITRITE UR-MCNC: NEGATIVE — SIGNIFICANT CHANGE UP
NON HDL CHOLESTEROL: 83 MG/DL — SIGNIFICANT CHANGE UP
NRBC # BLD: 0 /100 WBCS — SIGNIFICANT CHANGE UP (ref 0–0)
NT-PROBNP SERPL-SCNC: 2911 PG/ML — HIGH (ref 0–300)
NT-PROBNP SERPL-SCNC: 4251 PG/ML — HIGH (ref 0–300)
O2 CT VFR BLD CALC: 33 MMHG — SIGNIFICANT CHANGE UP (ref 30–65)
O2 CT VFR BLD CALC: 38 MMHG — SIGNIFICANT CHANGE UP (ref 30–65)
PCO2 BLDMV: 34 MMHG — SIGNIFICANT CHANGE UP (ref 30–65)
PCO2 BLDMV: 34 MMHG — SIGNIFICANT CHANGE UP (ref 30–65)
PH BLDMV: 7.39 — SIGNIFICANT CHANGE UP (ref 7.32–7.45)
PH BLDMV: 7.42 — SIGNIFICANT CHANGE UP (ref 7.32–7.45)
PH UR: 5 — SIGNIFICANT CHANGE UP (ref 5–8)
PHOSPHATE SERPL-MCNC: 2.4 MG/DL — LOW (ref 2.5–4.5)
PHOSPHATE SERPL-MCNC: 2.9 MG/DL — SIGNIFICANT CHANGE UP (ref 2.4–4.7)
PHOSPHATE SERPL-MCNC: 3 MG/DL — SIGNIFICANT CHANGE UP (ref 2.5–4.5)
PLAT MORPH BLD: NORMAL — SIGNIFICANT CHANGE UP
PLATELET # BLD AUTO: 345 K/UL — SIGNIFICANT CHANGE UP (ref 150–400)
PLATELET # BLD AUTO: 352 K/UL — SIGNIFICANT CHANGE UP (ref 150–400)
POTASSIUM SERPL-MCNC: 3.5 MMOL/L — SIGNIFICANT CHANGE UP (ref 3.5–5.3)
POTASSIUM SERPL-MCNC: 3.7 MMOL/L — SIGNIFICANT CHANGE UP (ref 3.5–5.3)
POTASSIUM SERPL-MCNC: 4.1 MMOL/L — SIGNIFICANT CHANGE UP (ref 3.5–5.3)
POTASSIUM SERPL-SCNC: 3.5 MMOL/L — SIGNIFICANT CHANGE UP (ref 3.5–5.3)
POTASSIUM SERPL-SCNC: 3.7 MMOL/L — SIGNIFICANT CHANGE UP (ref 3.5–5.3)
POTASSIUM SERPL-SCNC: 4.1 MMOL/L — SIGNIFICANT CHANGE UP (ref 3.5–5.3)
PROCALCITONIN SERPL-MCNC: 0.78 NG/ML — HIGH (ref 0.02–0.1)
PROT SERPL-MCNC: 5.3 G/DL — LOW (ref 6–8.3)
PROT SERPL-MCNC: 5.9 G/DL — LOW (ref 6–8.3)
PROT UR-MCNC: 100 MG/DL
RBC # BLD: 3.33 M/UL — LOW (ref 4.2–5.8)
RBC # BLD: 3.35 M/UL — LOW (ref 4.2–5.8)
RBC # FLD: 14.4 % — SIGNIFICANT CHANGE UP (ref 10.3–14.5)
RBC # FLD: 14.4 % — SIGNIFICANT CHANGE UP (ref 10.3–14.5)
RBC BLD AUTO: SIGNIFICANT CHANGE UP
RBC CASTS # UR COMP ASSIST: 937 /HPF — HIGH (ref 0–4)
REVIEW: SIGNIFICANT CHANGE UP
RH IG SCN BLD-IMP: POSITIVE — SIGNIFICANT CHANGE UP
RSV RNA NPH QL NAA+NON-PROBE: SIGNIFICANT CHANGE UP
S AUREUS DNA NOSE QL NAA+PROBE: SIGNIFICANT CHANGE UP
SAO2 % BLDMV: 60.3 — SIGNIFICANT CHANGE UP (ref 60–90)
SAO2 % BLDMV: 67.2 — SIGNIFICANT CHANGE UP (ref 60–90)
SARS-COV-2 RNA SPEC QL NAA+PROBE: SIGNIFICANT CHANGE UP
SODIUM SERPL-SCNC: 136 MMOL/L — SIGNIFICANT CHANGE UP (ref 135–145)
SODIUM SERPL-SCNC: 137 MMOL/L — SIGNIFICANT CHANGE UP (ref 135–145)
SODIUM SERPL-SCNC: 137 MMOL/L — SIGNIFICANT CHANGE UP (ref 135–145)
SP GR SPEC: 1.02 — SIGNIFICANT CHANGE UP (ref 1–1.03)
SPECIMEN SOURCE: SIGNIFICANT CHANGE UP
SQUAMOUS # UR AUTO: 2 /HPF — SIGNIFICANT CHANGE UP (ref 0–5)
T3 SERPL-MCNC: 168 NG/DL — SIGNIFICANT CHANGE UP (ref 80–200)
T4 AB SER-ACNC: 9.4 UG/DL — SIGNIFICANT CHANGE UP (ref 4.6–12)
TRIGL SERPL-MCNC: 124 MG/DL — SIGNIFICANT CHANGE UP
TROPONIN T, HIGH SENSITIVITY RESULT: 158 NG/L — HIGH (ref 0–51)
TROPONIN T, HIGH SENSITIVITY RESULT: 203 NG/L — HIGH (ref 0–51)
TSH SERPL-MCNC: 5.4 UIU/ML — HIGH (ref 0.27–4.2)
UROBILINOGEN FLD QL: 0.2 MG/DL — SIGNIFICANT CHANGE UP (ref 0.2–1)
WBC # BLD: 10.5 K/UL — SIGNIFICANT CHANGE UP (ref 3.8–10.5)
WBC # BLD: 8.8 K/UL — SIGNIFICANT CHANGE UP (ref 3.8–10.5)
WBC # FLD AUTO: 10.5 K/UL — SIGNIFICANT CHANGE UP (ref 3.8–10.5)
WBC # FLD AUTO: 8.8 K/UL — SIGNIFICANT CHANGE UP (ref 3.8–10.5)
WBC UR QL: 25 /HPF — HIGH (ref 0–5)

## 2024-12-04 PROCEDURE — 85025 COMPLETE CBC W/AUTO DIFF WBC: CPT

## 2024-12-04 PROCEDURE — 73702 CT LWR EXTREMITY W/O&W/DYE: CPT | Mod: MC

## 2024-12-04 PROCEDURE — 93005 ELECTROCARDIOGRAM TRACING: CPT

## 2024-12-04 PROCEDURE — 82009 KETONE BODYS QUAL: CPT

## 2024-12-04 PROCEDURE — 80202 ASSAY OF VANCOMYCIN: CPT

## 2024-12-04 PROCEDURE — 71045 X-RAY EXAM CHEST 1 VIEW: CPT | Mod: 26

## 2024-12-04 PROCEDURE — 82330 ASSAY OF CALCIUM: CPT

## 2024-12-04 PROCEDURE — 93010 ELECTROCARDIOGRAM REPORT: CPT

## 2024-12-04 PROCEDURE — 82435 ASSAY OF BLOOD CHLORIDE: CPT

## 2024-12-04 PROCEDURE — 84484 ASSAY OF TROPONIN QUANT: CPT

## 2024-12-04 PROCEDURE — 83605 ASSAY OF LACTIC ACID: CPT

## 2024-12-04 PROCEDURE — 84100 ASSAY OF PHOSPHORUS: CPT

## 2024-12-04 PROCEDURE — 93971 EXTREMITY STUDY: CPT

## 2024-12-04 PROCEDURE — 99285 EMERGENCY DEPT VISIT HI MDM: CPT

## 2024-12-04 PROCEDURE — 82962 GLUCOSE BLOOD TEST: CPT

## 2024-12-04 PROCEDURE — 85730 THROMBOPLASTIN TIME PARTIAL: CPT

## 2024-12-04 PROCEDURE — 73590 X-RAY EXAM OF LOWER LEG: CPT

## 2024-12-04 PROCEDURE — 82947 ASSAY GLUCOSE BLOOD QUANT: CPT

## 2024-12-04 PROCEDURE — 80048 BASIC METABOLIC PNL TOTAL CA: CPT

## 2024-12-04 PROCEDURE — 83615 LACTATE (LD) (LDH) ENZYME: CPT

## 2024-12-04 PROCEDURE — 85014 HEMATOCRIT: CPT

## 2024-12-04 PROCEDURE — 94799 UNLISTED PULMONARY SVC/PX: CPT

## 2024-12-04 PROCEDURE — 36556 INSERT NON-TUNNEL CV CATH: CPT | Mod: 59

## 2024-12-04 PROCEDURE — 84295 ASSAY OF SERUM SODIUM: CPT

## 2024-12-04 PROCEDURE — 71275 CT ANGIOGRAPHY CHEST: CPT | Mod: 26

## 2024-12-04 PROCEDURE — 85018 HEMOGLOBIN: CPT

## 2024-12-04 PROCEDURE — 36415 COLL VENOUS BLD VENIPUNCTURE: CPT

## 2024-12-04 PROCEDURE — 76937 US GUIDE VASCULAR ACCESS: CPT | Mod: 26

## 2024-12-04 PROCEDURE — 74018 RADEX ABDOMEN 1 VIEW: CPT

## 2024-12-04 PROCEDURE — 94760 N-INVAS EAR/PLS OXIMETRY 1: CPT

## 2024-12-04 PROCEDURE — 85610 PROTHROMBIN TIME: CPT

## 2024-12-04 PROCEDURE — 87640 STAPH A DNA AMP PROBE: CPT

## 2024-12-04 PROCEDURE — 99291 CRITICAL CARE FIRST HOUR: CPT | Mod: GC,25

## 2024-12-04 PROCEDURE — 82803 BLOOD GASES ANY COMBINATION: CPT

## 2024-12-04 PROCEDURE — 83880 ASSAY OF NATRIURETIC PEPTIDE: CPT

## 2024-12-04 PROCEDURE — 93503 INSERT/PLACE HEART CATHETER: CPT

## 2024-12-04 PROCEDURE — 93970 EXTREMITY STUDY: CPT

## 2024-12-04 PROCEDURE — 93306 TTE W/DOPPLER COMPLETE: CPT

## 2024-12-04 PROCEDURE — 84132 ASSAY OF SERUM POTASSIUM: CPT

## 2024-12-04 PROCEDURE — 85027 COMPLETE CBC AUTOMATED: CPT

## 2024-12-04 PROCEDURE — 86850 RBC ANTIBODY SCREEN: CPT

## 2024-12-04 PROCEDURE — 71045 X-RAY EXAM CHEST 1 VIEW: CPT | Mod: 26,77,76

## 2024-12-04 PROCEDURE — 70450 CT HEAD/BRAIN W/O DYE: CPT | Mod: 26

## 2024-12-04 PROCEDURE — 99292 CRITICAL CARE ADDL 30 MIN: CPT | Mod: 25

## 2024-12-04 PROCEDURE — 80053 COMPREHEN METABOLIC PANEL: CPT

## 2024-12-04 PROCEDURE — 87641 MR-STAPH DNA AMP PROBE: CPT

## 2024-12-04 PROCEDURE — 83735 ASSAY OF MAGNESIUM: CPT

## 2024-12-04 PROCEDURE — 94002 VENT MGMT INPAT INIT DAY: CPT

## 2024-12-04 PROCEDURE — 87040 BLOOD CULTURE FOR BACTERIA: CPT

## 2024-12-04 PROCEDURE — 36620 INSERTION CATHETER ARTERY: CPT

## 2024-12-04 PROCEDURE — 86901 BLOOD TYPING SEROLOGIC RH(D): CPT

## 2024-12-04 PROCEDURE — 71045 X-RAY EXAM CHEST 1 VIEW: CPT

## 2024-12-04 PROCEDURE — 83036 HEMOGLOBIN GLYCOSYLATED A1C: CPT

## 2024-12-04 PROCEDURE — 86900 BLOOD TYPING SEROLOGIC ABO: CPT

## 2024-12-04 PROCEDURE — T1013: CPT

## 2024-12-04 RX ORDER — DEXMEDETOMIDINE HYDROCHLORIDE 4 UG/ML
0.5 INJECTION, SOLUTION INTRAVENOUS
Qty: 200 | Refills: 0 | Status: DISCONTINUED | OUTPATIENT
Start: 2024-12-04 | End: 2024-12-05

## 2024-12-04 RX ORDER — SODIUM CHLORIDE 9 MG/ML
10 INJECTION, SOLUTION INTRAMUSCULAR; INTRAVENOUS; SUBCUTANEOUS
Refills: 0 | Status: DISCONTINUED | OUTPATIENT
Start: 2024-12-04 | End: 2024-12-06

## 2024-12-04 RX ORDER — PIPERACILLIN SODIUM AND TAZOBACTAM SODIUM 4; .5 G/20ML; G/20ML
3.38 INJECTION, POWDER, LYOPHILIZED, FOR SOLUTION INTRAVENOUS EVERY 8 HOURS
Refills: 0 | Status: COMPLETED | OUTPATIENT
Start: 2024-12-04 | End: 2024-12-11

## 2024-12-04 RX ORDER — VANCOMYCIN HCL 900 MCG/MG
1250 POWDER (GRAM) MISCELLANEOUS EVERY 12 HOURS
Refills: 0 | Status: COMPLETED | OUTPATIENT
Start: 2024-12-04 | End: 2024-12-04

## 2024-12-04 RX ORDER — POTASSIUM PHOSPHATE, MONOBASIC POTASSIUM PHOSPHATE, DIBASIC INJECTION, 236; 224 MG/ML; MG/ML
15 SOLUTION, CONCENTRATE INTRAVENOUS ONCE
Refills: 0 | Status: COMPLETED | OUTPATIENT
Start: 2024-12-04 | End: 2024-12-04

## 2024-12-04 RX ORDER — CHLORHEXIDINE GLUCONATE 1.2 MG/ML
1 RINSE ORAL
Refills: 0 | Status: DISCONTINUED | OUTPATIENT
Start: 2024-12-04 | End: 2024-12-13

## 2024-12-04 RX ORDER — AMPICILLIN AND SULBACTAM 1; .5 G/1; G/1
3 INJECTION, POWDER, FOR SOLUTION INTRAVENOUS EVERY 6 HOURS
Refills: 0 | Status: DISCONTINUED | OUTPATIENT
Start: 2024-12-04 | End: 2024-12-04

## 2024-12-04 RX ORDER — PROPOFOL 10 MG/ML
10 INJECTION, EMULSION INTRAVENOUS
Qty: 1000 | Refills: 0 | Status: DISCONTINUED | OUTPATIENT
Start: 2024-12-04 | End: 2024-12-05

## 2024-12-04 RX ORDER — PANTOPRAZOLE SODIUM 40 MG/1
40 TABLET, DELAYED RELEASE ORAL DAILY
Refills: 0 | Status: DISCONTINUED | OUTPATIENT
Start: 2024-12-04 | End: 2024-12-04

## 2024-12-04 RX ORDER — HEPARIN SODIUM,PORCINE 1000/ML
1300 VIAL (ML) INJECTION
Qty: 25000 | Refills: 0 | Status: DISCONTINUED | OUTPATIENT
Start: 2024-12-04 | End: 2024-12-07

## 2024-12-04 RX ORDER — VASOPRESSIN 20 [USP'U]/ML
0.06 INJECTION, SOLUTION INTRAVENOUS
Qty: 40 | Refills: 0 | Status: DISCONTINUED | OUTPATIENT
Start: 2024-12-04 | End: 2024-12-04

## 2024-12-04 RX ORDER — ACETAMINOPHEN 500MG 500 MG/1
1000 TABLET, COATED ORAL ONCE
Refills: 0 | Status: COMPLETED | OUTPATIENT
Start: 2024-12-04 | End: 2024-12-04

## 2024-12-04 RX ORDER — EPINEPHRINE 1 MG/ML(1)
0.04 AMPUL (ML) INJECTION
Qty: 4 | Refills: 0 | Status: DISCONTINUED | OUTPATIENT
Start: 2024-12-04 | End: 2024-12-04

## 2024-12-04 RX ORDER — FENTANYL 12 UG/H
2 PATCH, EXTENDED RELEASE TRANSDERMAL
Qty: 5000 | Refills: 0 | Status: DISCONTINUED | OUTPATIENT
Start: 2024-12-04 | End: 2024-12-04

## 2024-12-04 RX ORDER — HUMAN INSULIN 100 [IU]/ML
3 INJECTION, SUSPENSION SUBCUTANEOUS EVERY 6 HOURS
Refills: 0 | Status: DISCONTINUED | OUTPATIENT
Start: 2024-12-04 | End: 2024-12-05

## 2024-12-04 RX ORDER — MILRINONE LACTATE 200 UG/ML
0.25 INJECTION, SOLUTION INTRAVENOUS
Qty: 20 | Refills: 0 | Status: DISCONTINUED | OUTPATIENT
Start: 2024-12-04 | End: 2024-12-05

## 2024-12-04 RX ORDER — PIPERACILLIN SODIUM AND TAZOBACTAM SODIUM 4; .5 G/20ML; G/20ML
3.38 INJECTION, POWDER, LYOPHILIZED, FOR SOLUTION INTRAVENOUS ONCE
Refills: 0 | Status: COMPLETED | OUTPATIENT
Start: 2024-12-04 | End: 2024-12-04

## 2024-12-04 RX ORDER — CHLORHEXIDINE GLUCONATE 1.2 MG/ML
15 RINSE ORAL EVERY 12 HOURS
Refills: 0 | Status: DISCONTINUED | OUTPATIENT
Start: 2024-12-04 | End: 2024-12-05

## 2024-12-04 RX ORDER — KETAMINE HCL 50MG/ML(1)
1.5 SYRINGE (ML) INTRAVENOUS
Qty: 1000 | Refills: 0 | Status: DISCONTINUED | OUTPATIENT
Start: 2024-12-04 | End: 2024-12-04

## 2024-12-04 RX ORDER — PANTOPRAZOLE SODIUM 40 MG/1
40 TABLET, DELAYED RELEASE ORAL DAILY
Refills: 0 | Status: DISCONTINUED | OUTPATIENT
Start: 2024-12-04 | End: 2024-12-06

## 2024-12-04 RX ORDER — POTASSIUM CHLORIDE 600 MG/1
20 TABLET, EXTENDED RELEASE ORAL
Refills: 0 | Status: COMPLETED | OUTPATIENT
Start: 2024-12-04 | End: 2024-12-04

## 2024-12-04 RX ORDER — ALTEPLASE 2.2 MG/2ML
50 INJECTION, POWDER, LYOPHILIZED, FOR SOLUTION INTRAVENOUS ONCE
Refills: 0 | Status: COMPLETED | OUTPATIENT
Start: 2024-12-04 | End: 2024-12-04

## 2024-12-04 RX ORDER — NOREPINEPHRINE BITARTRATE 1 MG/ML
0.05 INJECTION, SOLUTION, CONCENTRATE INTRAVENOUS
Qty: 8 | Refills: 0 | Status: DISCONTINUED | OUTPATIENT
Start: 2024-12-04 | End: 2024-12-05

## 2024-12-04 RX ADMIN — NOREPINEPHRINE BITARTRATE 7.04 MICROGRAM(S)/KG/MIN: 1 INJECTION, SOLUTION, CONCENTRATE INTRAVENOUS at 11:21

## 2024-12-04 RX ADMIN — Medication 11.3 MICROGRAM(S)/KG/MIN: at 03:52

## 2024-12-04 RX ADMIN — Medication 13 UNIT(S)/HR: at 11:21

## 2024-12-04 RX ADMIN — FENTANYL 50 MICROGRAM(S): 12 PATCH, EXTENDED RELEASE TRANSDERMAL at 01:28

## 2024-12-04 RX ADMIN — ACETAMINOPHEN 500MG 1000 MILLIGRAM(S): 500 TABLET, COATED ORAL at 13:50

## 2024-12-04 RX ADMIN — NOREPINEPHRINE BITARTRATE 7.04 MICROGRAM(S)/KG/MIN: 1 INJECTION, SOLUTION, CONCENTRATE INTRAVENOUS at 05:33

## 2024-12-04 RX ADMIN — POTASSIUM CHLORIDE 50 MILLIEQUIVALENT(S): 600 TABLET, EXTENDED RELEASE ORAL at 05:08

## 2024-12-04 RX ADMIN — MILRINONE LACTATE 5.63 MICROGRAM(S)/KG/MIN: 200 INJECTION, SOLUTION INTRAVENOUS at 11:21

## 2024-12-04 RX ADMIN — PIPERACILLIN SODIUM AND TAZOBACTAM SODIUM 25 GRAM(S): 4; .5 INJECTION, POWDER, LYOPHILIZED, FOR SOLUTION INTRAVENOUS at 22:41

## 2024-12-04 RX ADMIN — NOREPINEPHRINE BITARTRATE 7.01 MICROGRAM(S)/KG/MIN: 1 INJECTION, SOLUTION, CONCENTRATE INTRAVENOUS at 01:28

## 2024-12-04 RX ADMIN — MILRINONE LACTATE 5.63 MICROGRAM(S)/KG/MIN: 200 INJECTION, SOLUTION INTRAVENOUS at 22:46

## 2024-12-04 RX ADMIN — CHLORHEXIDINE GLUCONATE 15 MILLILITER(S): 1.2 RINSE ORAL at 17:48

## 2024-12-04 RX ADMIN — Medication 13 UNIT(S)/HR: at 19:16

## 2024-12-04 RX ADMIN — HUMAN INSULIN 3 UNIT(S): 100 INJECTION, SUSPENSION SUBCUTANEOUS at 17:18

## 2024-12-04 RX ADMIN — Medication 11.3 MG/KG/HR: at 03:47

## 2024-12-04 RX ADMIN — CHLORHEXIDINE GLUCONATE 1 APPLICATION(S): 1.2 RINSE ORAL at 22:42

## 2024-12-04 RX ADMIN — MILRINONE LACTATE 5.63 MICROGRAM(S)/KG/MIN: 200 INJECTION, SOLUTION INTRAVENOUS at 04:20

## 2024-12-04 RX ADMIN — ALTEPLASE 50 MILLIGRAM(S): 2.2 INJECTION, POWDER, LYOPHILIZED, FOR SOLUTION INTRAVENOUS at 16:43

## 2024-12-04 RX ADMIN — CHLORHEXIDINE GLUCONATE 1 APPLICATION(S): 1.2 RINSE ORAL at 05:03

## 2024-12-04 RX ADMIN — PROPOFOL 4.51 MICROGRAM(S)/KG/MIN: 10 INJECTION, EMULSION INTRAVENOUS at 07:20

## 2024-12-04 RX ADMIN — PROPOFOL 4.51 MICROGRAM(S)/KG/MIN: 10 INJECTION, EMULSION INTRAVENOUS at 03:41

## 2024-12-04 RX ADMIN — ACETAMINOPHEN 500MG 400 MILLIGRAM(S): 500 TABLET, COATED ORAL at 13:21

## 2024-12-04 RX ADMIN — NOREPINEPHRINE BITARTRATE 7.04 MICROGRAM(S)/KG/MIN: 1 INJECTION, SOLUTION, CONCENTRATE INTRAVENOUS at 19:15

## 2024-12-04 RX ADMIN — POTASSIUM PHOSPHATE, MONOBASIC POTASSIUM PHOSPHATE, DIBASIC INJECTION, 62.5 MILLIMOLE(S): 236; 224 SOLUTION, CONCENTRATE INTRAVENOUS at 06:18

## 2024-12-04 RX ADMIN — PANTOPRAZOLE SODIUM 40 MILLIGRAM(S): 40 TABLET, DELAYED RELEASE ORAL at 11:47

## 2024-12-04 RX ADMIN — HUMAN INSULIN 3 UNIT(S): 100 INJECTION, SUSPENSION SUBCUTANEOUS at 11:27

## 2024-12-04 RX ADMIN — FENTANYL 7.51 MICROGRAM(S)/KG/HR: 12 PATCH, EXTENDED RELEASE TRANSDERMAL at 04:39

## 2024-12-04 RX ADMIN — PIPERACILLIN SODIUM AND TAZOBACTAM SODIUM 25 GRAM(S): 4; .5 INJECTION, POWDER, LYOPHILIZED, FOR SOLUTION INTRAVENOUS at 13:16

## 2024-12-04 RX ADMIN — Medication 25 GRAM(S): at 05:46

## 2024-12-04 RX ADMIN — VASOPRESSIN 9 UNIT(S)/MIN: 20 INJECTION, SOLUTION INTRAVENOUS at 03:42

## 2024-12-04 RX ADMIN — Medication 166.67 MILLIGRAM(S): at 11:25

## 2024-12-04 RX ADMIN — PIPERACILLIN SODIUM AND TAZOBACTAM SODIUM 200 GRAM(S): 4; .5 INJECTION, POWDER, LYOPHILIZED, FOR SOLUTION INTRAVENOUS at 11:19

## 2024-12-04 RX ADMIN — DEXMEDETOMIDINE HYDROCHLORIDE 9.39 MICROGRAM(S)/KG/HR: 4 INJECTION, SOLUTION INTRAVENOUS at 11:20

## 2024-12-04 RX ADMIN — Medication 25 GRAM(S): at 15:31

## 2024-12-04 RX ADMIN — AMPICILLIN AND SULBACTAM 200 GRAM(S): 1; .5 INJECTION, POWDER, FOR SOLUTION INTRAVENOUS at 05:07

## 2024-12-04 RX ADMIN — CHLORHEXIDINE GLUCONATE 1 APPLICATION(S): 1.2 RINSE ORAL at 05:04

## 2024-12-04 RX ADMIN — DEXMEDETOMIDINE HYDROCHLORIDE 9.39 MICROGRAM(S)/KG/HR: 4 INJECTION, SOLUTION INTRAVENOUS at 19:15

## 2024-12-04 RX ADMIN — PROPOFOL 4.51 MICROGRAM(S)/KG/MIN: 10 INJECTION, EMULSION INTRAVENOUS at 19:17

## 2024-12-04 RX ADMIN — CHLORHEXIDINE GLUCONATE 1 APPLICATION(S): 1.2 RINSE ORAL at 22:43

## 2024-12-04 RX ADMIN — CHLORHEXIDINE GLUCONATE 15 MILLILITER(S): 1.2 RINSE ORAL at 05:07

## 2024-12-04 RX ADMIN — HUMAN INSULIN 3 UNIT(S): 100 INJECTION, SUSPENSION SUBCUTANEOUS at 05:13

## 2024-12-04 RX ADMIN — MILRINONE LACTATE 5.63 MICROGRAM(S)/KG/MIN: 200 INJECTION, SOLUTION INTRAVENOUS at 19:10

## 2024-12-04 NOTE — PROCEDURE NOTE - ADDITIONAL PROCEDURE DETAILS
Under sterile conditions and with proper draping of the patient, a pulmonary artery catheter was floated through the introducer catheter in the right internal jugular vein. With the balloon inflated with 1.5ml of air, the PA catheter was advanced while monitoring the pressure tracing from the central venous system to the right ventricle and to the pulmonary artery. Wedge tracing was observed at 47 cm. The balloon was deflated, PA pressure tracing was noted again. The position of the catheter was verified by CXR. The initial readings are:  CVP: 4 mmHg  PA S/D: 32/10 mmHg  Wedge deferred as patient admitted with massive PE    Complications: None
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
## 2024-12-04 NOTE — PROCEDURE NOTE - NSINFORMCONSENT_GEN_A_CORE
Consent obtained from son, at bedside, form in chart/Benefits, risks, and possible complications of procedure explained to patient/caregiver who verbalized understanding and gave written consent. Consent obtained from son, at bedside, form in chart; emphasized increased risk of bleeding given that patient received tPA/Benefits, risks, and possible complications of procedure explained to patient/caregiver who verbalized understanding and gave written consent.

## 2024-12-04 NOTE — H&P ADULT - NSHPPOACENTRALVENOUSCATHETER_GEN_ALL_CORE
RVF TLC (placed at [facility] on 12/3)/yes RFV TLC (placed at [facility] on 12/3)/yes
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
## 2024-12-04 NOTE — H&P ADULT - NSHPPHYSICALEXAM_GEN_ALL_CORE
PHYSICAL EXAM:  GENERAL: No acute distress, well-developed  HEAD:  Atraumatic, Normocephalic  EYES: EOMI, PERRLA, conjunctiva and sclera clear  NECK: Supple, no lymphadenopathy, no JVD  CHEST/LUNG: CTAB; No wheezes, rales, or rhonchi  HEART: Regular rate and rhythm. Normal S1/S2. No murmurs, rubs, or gallops  ABDOMEN: Soft, non-tender, non-distended; normal bowel sounds, no organomegaly  EXTREMITIES:  2+ peripheral pulses b/l, No clubbing, cyanosis, or edema  NEUROLOGY: A&O x 3, no focal deficits  SKIN: No rashes or lesions    SpO2: 100% (04 Dec 2024 03:00) (77% - 100%)    O2 Parameters below as of 04 Dec 2024 02:48  Patient On (Oxygen Delivery Method): ventilator    O2 Concentration (%): 50    Mode: AC/ CMV (Assist Control/ Continuous Mandatory Ventilation), RR (machine): 30, TV (machine): 400, FiO2: 50, PEEP: 6, ITime: 1    CO: 4.9 (12-04-24 @ 00:00) (4.3 - 5.4)  CI: 2.6 (12-04-24 @ 00:00) (2.3 - 2.8)      CAPILLARY BLOOD GLUCOSE  POCT Blood Glucose.: 184 mg/dL (03 Dec 2024 22:10)    ICU Vital Signs Last 24 Hrs  T(C): 35.8 (04 Dec 2024 01:15), Max: 38.5 (03 Dec 2024 18:45)  T(F): 96.4 (04 Dec 2024 01:15), Max: 101.3 (03 Dec 2024 18:45)  HR: 86 (04 Dec 2024 03:00) (75 - 136)  BP: 143/82 (04 Dec 2024 03:00) (81/70 - 143/82)  BP(mean): 105 (04 Dec 2024 03:00) (69 - 105)  ABP: 208/91 (04 Dec 2024 03:00) (81/55 - 208/91)  ABP(mean): 133 (04 Dec 2024 03:00) (57 - 133)  RR: 30 (04 Dec 2024 03:00) (13 - 31) PHYSICAL EXAM:    SpO2: 100% (04 Dec 2024 03:00) (77% - 100%)    O2 Parameters below as of 04 Dec 2024 02:48  Patient On (Oxygen Delivery Method): ventilator    O2 Concentration (%): 50    Mode: AC/ CMV (Assist Control/ Continuous Mandatory Ventilation), RR (machine): 30, TV (machine): 400, FiO2: 50, PEEP: 6, ITime: 1    CO: 4.9 (12-04-24 @ 00:00) (4.3 - 5.4)  CI: 2.6 (12-04-24 @ 00:00) (2.3 - 2.8)      CAPILLARY BLOOD GLUCOSE  POCT Blood Glucose.: 184 mg/dL (03 Dec 2024 22:10)    ICU Vital Signs Last 24 Hrs  T(C): 35.8 (04 Dec 2024 01:15), Max: 38.5 (03 Dec 2024 18:45)  T(F): 96.4 (04 Dec 2024 01:15), Max: 101.3 (03 Dec 2024 18:45)  HR: 86 (04 Dec 2024 03:00) (75 - 136)  BP: 143/82 (04 Dec 2024 03:00) (81/70 - 143/82)  BP(mean): 105 (04 Dec 2024 03:00) (69 - 105)  ABP: 208/91 (04 Dec 2024 03:00) (81/55 - 208/91)  ABP(mean): 133 (04 Dec 2024 03:00) (57 - 133)  RR: 30 (04 Dec 2024 03:00) (13 - 31)
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
## 2024-12-04 NOTE — PROGRESS NOTE ADULT - SUBJECTIVE AND OBJECTIVE BOX
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    ====================CARDIOVASCULAR==================  #Acute RVF in setting of massive PE  - 12/3 TTE: evidence of Seaman's sigh w/ moderate pHTN  - 12/3 repeat TTE: severely reduced RVSF, mod RVE, mod TR (PASP ~32 mmHg), dilated IVC  - c/w milrinone 0.25  - wean norepinephrine as tolerated for MAP > 65  - trend cvp, mv02,perfusion indices and lactate    milrinone Infusion 0.25 MICROgram(s)/kG/Min (5.63 mL/Hr) IV Continuous <Continuous>  norepinephrine Infusion 0.05 MICROgram(s)/kG/Min (7.04 mL/Hr) IV Continuous <Continuous>    ===================HEMATOLOGIC/ONC ===================  #Anemia, multifactorial   - H/H and platelets stable  - trend daily    #LLE Acute DVT, PE  - s/p tPA 12/3 and 12/4, now on systemic heparin per protocol   - 12/3 VA Duplex LLE: Interval resolution of L fem DVT, persistent   #DVT ppx: as above    heparin  Infusion 1300 Unit(s)/Hr (13 mL/Hr) IV Continuous <Continuous>    ===================== RENAL =========================  - Cr within normal limits, trend daily  - strict I/Os    ==================== GASTROINTESTINAL===================  #NPO  - OGT, monitor for BM  - c/w PPI    pantoprazole  Injectable 40 milliGRAM(s) IV Push daily    =======================    ENDOCRINE  =====================  #Hyperglycemia  - likely stress related  - a1c 5.9, glucose now controlled  - continue ISS, NPH 3 q6h and fs monitoring q6h    insulin lispro (ADMELOG) corrective regimen sliding scale   SubCutaneous three times a day before meals  insulin NPH human recombinant 3 Unit(s) SubCutaneous every 6 hours    ========================INFECTIOUS DISEASE================  #RLE Cellulitis   - s/p bedside debridement and wound vac placement on 11/27  - f/u blood cultures 12/4  - continue zosyn  - general surgery consulted, f/u reccs  - trend wbc and fever curve    piperacillin/tazobactam IVPB.. 3.375 Gram(s) IV Intermittent every 8 hours    #full code  Lines: RIJ PAC 12/4 -   L axillary lisa 12/4 -     ======================= DISPOSITION  =====================  [X] Critical   [ ] Guarded    [ ] Stable    [X] Maintain in CICU  [ ] Downgrade to Telemetry  [ ] Discharge Home    Alma Puckett, [facility]    Patient requires continuous monitoring with bedside rhythm monitoring, pulse ox monitoring, and intermittent blood gas analysis. Care plan discussed with ICU care team. Patient remained critical and at risk for life threatening decompensation.  Patient seen, examined and plan discussed with CCU team during rounds.     I have personally provided 30 minutes of critical care time excluding time spent on separate procedures, in addition to initial critical care time provided by the CICU Attending, Dr. Lutz.    By signing my name below, I, Jaya Zee, attest that this documentation has been prepared under the direction and in the presence of Alma Puckett NP  Electronically signed: Maria Alonzo, 12-04-24 @ 21:31    I, Alma Puckett NP, personally performed the services described in this documentation. all medical record entries made by the frankyibdhaval were at my direction and in my presence. I have reviewed the chart and agree that the record reflects my personal performance and is accurate and complete  Electronically signed: Alma Puckett NP

## 2024-12-04 NOTE — H&P ADULT - NSHPLABSRESULTS_GEN_ALL_CORE
LABS 12/4/24:                   9.9    10.50 )-----------( 352      ( 04 Dec 2024 00:50 )                 30.8     12-04    137  |  105  |  15.0  ----------------------------<  284[H]  3.7   |  15.0[L]  |  0.77    Ca    8.4      04 Dec 2024 00:50  Phos  2.9     12-04  Mg     1.9     12-04    Troponin T, High Sensitivity Result: 527 ng/L (12-03-24 @ 13:51)  Troponin T, High Sensitivity Result: 48 ng/L (12-03-24 @ 09:00)  Troponin T, High Sensitivity Result: 22 ng/L (12-03-24 @ 08:00)    PT/INR - ( 03 Dec 2024 11:08 )   PT: 12.7 sec;   INR: 1.10 ratio    PTT - ( 04 Dec 2024 00:50 )  PTT:86.2 sec    ABG - ( 04 Dec 2024 03:15 )  pH, Arterial: 7.36  pH, Blood: x     /  pCO2: 32    /  pO2: 171   / HCO3: 18    / Base Excess: -6.5  /  SaO2: 99.7      Blood Gas Arterial, Lactate: 4.2 mmol/L (12-04-24 @ 03:15)  Blood Gas Arterial, Lactate: 6.30 mmol/L (12-03-24 @ 22:11)  Blood Gas Arterial, Lactate: 3.90 mmol/L (12-03-24 @ 17:29)  Blood Gas Arterial, Lactate: 2.90 mmol/L (12-03-24 @ 13:51)  Blood Gas Arterial, Lactate: 3.20 mmol/L (12-03-24 @ 11:11)  Blood Gas Arterial, Lactate: 5.20 mmol/L (12-03-24 @ 10:23)  Blood Gas Arterial, Lactate: 7.00 mmol/L (12-03-24 @ 09:06)  Lactate, Blood: 7.9 mmol/L (12-03-24 @ 08:00)    Urinalysis Basic - ( 04 Dec 2024 00:50 )    Color: x / Appearance: x / SG: x / pH: x  Gluc: 284 mg/dL / Ketone: x  / Bili: x / Urobili: x   Blood: x / Protein: x / Nitrite: x   Leuk Esterase: x / RBC: x / WBC x   Sq Epi: x / Non Sq Epi: x / Bacteria: x  ======================Micro/Rad/Cardio=================  Telemetry: Reviewed   EKG: Reviewed  CXR: Reviewed  Culture: Reviewed

## 2024-12-05 LAB
ALBUMIN SERPL ELPH-MCNC: 2.5 G/DL — LOW (ref 3.3–5)
ALBUMIN SERPL ELPH-MCNC: 2.7 G/DL — LOW (ref 3.3–5)
ALP SERPL-CCNC: 109 U/L — SIGNIFICANT CHANGE UP (ref 40–120)
ALP SERPL-CCNC: 113 U/L — SIGNIFICANT CHANGE UP (ref 40–120)
ALT FLD-CCNC: 43 U/L — SIGNIFICANT CHANGE UP (ref 10–45)
ALT FLD-CCNC: 54 U/L — HIGH (ref 10–45)
ANION GAP SERPL CALC-SCNC: 13 MMOL/L — SIGNIFICANT CHANGE UP (ref 5–17)
ANION GAP SERPL CALC-SCNC: 14 MMOL/L — SIGNIFICANT CHANGE UP (ref 5–17)
APTT BLD: 65.1 SEC — HIGH (ref 24.5–35.6)
AST SERPL-CCNC: 37 U/L — SIGNIFICANT CHANGE UP (ref 10–40)
AST SERPL-CCNC: 48 U/L — HIGH (ref 10–40)
BASE EXCESS BLDMV CALC-SCNC: -2.2 MMOL/L — SIGNIFICANT CHANGE UP (ref -3–3)
BASE EXCESS BLDMV CALC-SCNC: -2.3 MMOL/L — SIGNIFICANT CHANGE UP (ref -3–3)
BASE EXCESS BLDMV CALC-SCNC: -3.1 MMOL/L — LOW (ref -3–3)
BASE EXCESS BLDMV CALC-SCNC: -3.2 MMOL/L — LOW (ref -3–3)
BASOPHILS # BLD AUTO: 0.06 K/UL — SIGNIFICANT CHANGE UP (ref 0–0.2)
BASOPHILS # BLD AUTO: 0.08 K/UL — SIGNIFICANT CHANGE UP (ref 0–0.2)
BASOPHILS NFR BLD AUTO: 0.4 % — SIGNIFICANT CHANGE UP (ref 0–2)
BASOPHILS NFR BLD AUTO: 0.5 % — SIGNIFICANT CHANGE UP (ref 0–2)
BILIRUB SERPL-MCNC: 0.8 MG/DL — SIGNIFICANT CHANGE UP (ref 0.2–1.2)
BILIRUB SERPL-MCNC: 1 MG/DL — SIGNIFICANT CHANGE UP (ref 0.2–1.2)
BUN SERPL-MCNC: 13 MG/DL — SIGNIFICANT CHANGE UP (ref 7–23)
BUN SERPL-MCNC: 15 MG/DL — SIGNIFICANT CHANGE UP (ref 7–23)
CALCIUM SERPL-MCNC: 8.2 MG/DL — LOW (ref 8.4–10.5)
CALCIUM SERPL-MCNC: 8.3 MG/DL — LOW (ref 8.4–10.5)
CHLORIDE SERPL-SCNC: 105 MMOL/L — SIGNIFICANT CHANGE UP (ref 96–108)
CHLORIDE SERPL-SCNC: 105 MMOL/L — SIGNIFICANT CHANGE UP (ref 96–108)
CO2 BLDMV-SCNC: 21 MMOL/L — SIGNIFICANT CHANGE UP (ref 21–29)
CO2 BLDMV-SCNC: 23 MMOL/L — SIGNIFICANT CHANGE UP (ref 21–29)
CO2 BLDMV-SCNC: 24 MMOL/L — SIGNIFICANT CHANGE UP (ref 21–29)
CO2 BLDMV-SCNC: 24 MMOL/L — SIGNIFICANT CHANGE UP (ref 21–29)
CO2 SERPL-SCNC: 16 MMOL/L — LOW (ref 22–31)
CO2 SERPL-SCNC: 18 MMOL/L — LOW (ref 22–31)
CREAT SERPL-MCNC: 0.64 MG/DL — SIGNIFICANT CHANGE UP (ref 0.5–1.3)
CREAT SERPL-MCNC: 0.71 MG/DL — SIGNIFICANT CHANGE UP (ref 0.5–1.3)
EGFR: 100 ML/MIN/1.73M2 — SIGNIFICANT CHANGE UP
EGFR: 103 ML/MIN/1.73M2 — SIGNIFICANT CHANGE UP
EOSINOPHIL # BLD AUTO: 0.08 K/UL — SIGNIFICANT CHANGE UP (ref 0–0.5)
EOSINOPHIL # BLD AUTO: 0.13 K/UL — SIGNIFICANT CHANGE UP (ref 0–0.5)
EOSINOPHIL NFR BLD AUTO: 0.5 % — SIGNIFICANT CHANGE UP (ref 0–6)
EOSINOPHIL NFR BLD AUTO: 0.8 % — SIGNIFICANT CHANGE UP (ref 0–6)
GAS PNL BLDA: SIGNIFICANT CHANGE UP
GAS PNL BLDMV: SIGNIFICANT CHANGE UP
GLUCOSE BLDC GLUCOMTR-MCNC: 123 MG/DL — HIGH (ref 70–99)
GLUCOSE BLDC GLUCOMTR-MCNC: 134 MG/DL — HIGH (ref 70–99)
GLUCOSE SERPL-MCNC: 104 MG/DL — HIGH (ref 70–99)
GLUCOSE SERPL-MCNC: 127 MG/DL — HIGH (ref 70–99)
HCO3 BLDMV-SCNC: 20 MMOL/L — SIGNIFICANT CHANGE UP (ref 20–28)
HCO3 BLDMV-SCNC: 22 MMOL/L — SIGNIFICANT CHANGE UP (ref 20–28)
HCT VFR BLD CALC: 27.6 % — LOW (ref 39–50)
HCT VFR BLD CALC: 29.7 % — LOW (ref 39–50)
HGB BLD-MCNC: 8.9 G/DL — LOW (ref 13–17)
HGB BLD-MCNC: 9.5 G/DL — LOW (ref 13–17)
HOROWITZ INDEX BLDMV+IHG-RTO: 40 — SIGNIFICANT CHANGE UP
IMM GRANULOCYTES NFR BLD AUTO: 1.4 % — HIGH (ref 0–0.9)
IMM GRANULOCYTES NFR BLD AUTO: 1.5 % — HIGH (ref 0–0.9)
INR BLD: 1.27 RATIO — HIGH (ref 0.85–1.16)
LYMPHOCYTES # BLD AUTO: 1.15 K/UL — SIGNIFICANT CHANGE UP (ref 1–3.3)
LYMPHOCYTES # BLD AUTO: 1.5 K/UL — SIGNIFICANT CHANGE UP (ref 1–3.3)
LYMPHOCYTES # BLD AUTO: 7.5 % — LOW (ref 13–44)
LYMPHOCYTES # BLD AUTO: 8.9 % — LOW (ref 13–44)
MAGNESIUM SERPL-MCNC: 2.2 MG/DL — SIGNIFICANT CHANGE UP (ref 1.6–2.6)
MCHC RBC-ENTMCNC: 28.8 PG — SIGNIFICANT CHANGE UP (ref 27–34)
MCHC RBC-ENTMCNC: 29.3 PG — SIGNIFICANT CHANGE UP (ref 27–34)
MCHC RBC-ENTMCNC: 32 G/DL — SIGNIFICANT CHANGE UP (ref 32–36)
MCHC RBC-ENTMCNC: 32.2 G/DL — SIGNIFICANT CHANGE UP (ref 32–36)
MCV RBC AUTO: 89.3 FL — SIGNIFICANT CHANGE UP (ref 80–100)
MCV RBC AUTO: 91.7 FL — SIGNIFICANT CHANGE UP (ref 80–100)
MONOCYTES # BLD AUTO: 1.34 K/UL — HIGH (ref 0–0.9)
MONOCYTES # BLD AUTO: 1.46 K/UL — HIGH (ref 0–0.9)
MONOCYTES NFR BLD AUTO: 8.7 % — SIGNIFICANT CHANGE UP (ref 2–14)
MONOCYTES NFR BLD AUTO: 8.7 % — SIGNIFICANT CHANGE UP (ref 2–14)
NEUTROPHILS # BLD AUTO: 12.47 K/UL — HIGH (ref 1.8–7.4)
NEUTROPHILS # BLD AUTO: 13.42 K/UL — HIGH (ref 1.8–7.4)
NEUTROPHILS NFR BLD AUTO: 79.7 % — HIGH (ref 43–77)
NEUTROPHILS NFR BLD AUTO: 81.4 % — HIGH (ref 43–77)
NRBC # BLD: 0 /100 WBCS — SIGNIFICANT CHANGE UP (ref 0–0)
NRBC # BLD: 0 /100 WBCS — SIGNIFICANT CHANGE UP (ref 0–0)
O2 CT VFR BLD CALC: 38 MMHG — SIGNIFICANT CHANGE UP (ref 30–65)
O2 CT VFR BLD CALC: 41 MMHG — SIGNIFICANT CHANGE UP (ref 30–65)
O2 CT VFR BLD CALC: 42 MMHG — SIGNIFICANT CHANGE UP (ref 30–65)
O2 CT VFR BLD CALC: 43 MMHG — SIGNIFICANT CHANGE UP (ref 30–65)
PCO2 BLDMV: 29 MMHG — LOW (ref 30–65)
PCO2 BLDMV: 37 MMHG — SIGNIFICANT CHANGE UP (ref 30–65)
PCO2 BLDMV: 38 MMHG — SIGNIFICANT CHANGE UP (ref 30–65)
PCO2 BLDMV: 39 MMHG — SIGNIFICANT CHANGE UP (ref 30–65)
PH BLDMV: 7.36 — SIGNIFICANT CHANGE UP (ref 7.32–7.45)
PH BLDMV: 7.38 — SIGNIFICANT CHANGE UP (ref 7.32–7.45)
PH BLDMV: 7.39 — SIGNIFICANT CHANGE UP (ref 7.32–7.45)
PH BLDMV: 7.44 — SIGNIFICANT CHANGE UP (ref 7.32–7.45)
PHOSPHATE SERPL-MCNC: 3 MG/DL — SIGNIFICANT CHANGE UP (ref 2.5–4.5)
PLATELET # BLD AUTO: 188 K/UL — SIGNIFICANT CHANGE UP (ref 150–400)
PLATELET # BLD AUTO: 268 K/UL — SIGNIFICANT CHANGE UP (ref 150–400)
POTASSIUM SERPL-MCNC: 3.3 MMOL/L — LOW (ref 3.5–5.3)
POTASSIUM SERPL-MCNC: 3.8 MMOL/L — SIGNIFICANT CHANGE UP (ref 3.5–5.3)
POTASSIUM SERPL-SCNC: 3.3 MMOL/L — LOW (ref 3.5–5.3)
POTASSIUM SERPL-SCNC: 3.8 MMOL/L — SIGNIFICANT CHANGE UP (ref 3.5–5.3)
PROT SERPL-MCNC: 5.2 G/DL — LOW (ref 6–8.3)
PROT SERPL-MCNC: 5.4 G/DL — LOW (ref 6–8.3)
PROTHROM AB SERPL-ACNC: 14.5 SEC — HIGH (ref 9.9–13.4)
RBC # BLD: 3.09 M/UL — LOW (ref 4.2–5.8)
RBC # BLD: 3.24 M/UL — LOW (ref 4.2–5.8)
RBC # FLD: 14.6 % — HIGH (ref 10.3–14.5)
RBC # FLD: 14.6 % — HIGH (ref 10.3–14.5)
SAO2 % BLDMV: 69.3 — SIGNIFICANT CHANGE UP (ref 60–90)
SAO2 % BLDMV: 70.9 — SIGNIFICANT CHANGE UP (ref 60–90)
SAO2 % BLDMV: 71.3 — SIGNIFICANT CHANGE UP (ref 60–90)
SAO2 % BLDMV: 76.2 — SIGNIFICANT CHANGE UP (ref 60–90)
SODIUM SERPL-SCNC: 135 MMOL/L — SIGNIFICANT CHANGE UP (ref 135–145)
SODIUM SERPL-SCNC: 136 MMOL/L — SIGNIFICANT CHANGE UP (ref 135–145)
T3FREE SERPL-MCNC: 2.4 PG/ML — SIGNIFICANT CHANGE UP (ref 2–4.4)
T4 FREE SERPL-MCNC: 1.6 NG/DL — SIGNIFICANT CHANGE UP (ref 0.9–1.7)
WBC # BLD: 15.34 K/UL — HIGH (ref 3.8–10.5)
WBC # BLD: 16.83 K/UL — HIGH (ref 3.8–10.5)
WBC # FLD AUTO: 15.34 K/UL — HIGH (ref 3.8–10.5)
WBC # FLD AUTO: 16.83 K/UL — HIGH (ref 3.8–10.5)

## 2024-12-05 PROCEDURE — 99291 CRITICAL CARE FIRST HOUR: CPT | Mod: GC,25

## 2024-12-05 PROCEDURE — 99254 IP/OBS CNSLTJ NEW/EST MOD 60: CPT

## 2024-12-05 PROCEDURE — 71045 X-RAY EXAM CHEST 1 VIEW: CPT | Mod: 26

## 2024-12-05 PROCEDURE — 93010 ELECTROCARDIOGRAM REPORT: CPT

## 2024-12-05 PROCEDURE — 99292 CRITICAL CARE ADDL 30 MIN: CPT

## 2024-12-05 RX ORDER — FENTANYL 12 UG/H
25 PATCH, EXTENDED RELEASE TRANSDERMAL ONCE
Refills: 0 | Status: DISCONTINUED | OUTPATIENT
Start: 2024-12-05 | End: 2024-12-05

## 2024-12-05 RX ORDER — LIDOCAINE 40 MG/G
1 CREAM TOPICAL ONCE
Refills: 0 | Status: COMPLETED | OUTPATIENT
Start: 2024-12-05 | End: 2024-12-05

## 2024-12-05 RX ORDER — POTASSIUM CHLORIDE 600 MG/1
20 TABLET, EXTENDED RELEASE ORAL ONCE
Refills: 0 | Status: DISCONTINUED | OUTPATIENT
Start: 2024-12-05 | End: 2024-12-05

## 2024-12-05 RX ORDER — ACETAMINOPHEN 500MG 500 MG/1
1000 TABLET, COATED ORAL ONCE
Refills: 0 | Status: COMPLETED | OUTPATIENT
Start: 2024-12-05 | End: 2024-12-05

## 2024-12-05 RX ORDER — POTASSIUM CHLORIDE 600 MG/1
10 TABLET, EXTENDED RELEASE ORAL
Refills: 0 | Status: COMPLETED | OUTPATIENT
Start: 2024-12-05 | End: 2024-12-05

## 2024-12-05 RX ORDER — POLYETHYLENE GLYCOL 3350 17 G/17G
17 POWDER, FOR SOLUTION ORAL DAILY
Refills: 0 | Status: DISCONTINUED | OUTPATIENT
Start: 2024-12-05 | End: 2024-12-13

## 2024-12-05 RX ORDER — POTASSIUM CHLORIDE 600 MG/1
20 TABLET, EXTENDED RELEASE ORAL
Refills: 0 | Status: COMPLETED | OUTPATIENT
Start: 2024-12-05 | End: 2024-12-05

## 2024-12-05 RX ORDER — VANCOMYCIN HCL 900 MCG/MG
1250 POWDER (GRAM) MISCELLANEOUS EVERY 12 HOURS
Refills: 0 | Status: COMPLETED | OUTPATIENT
Start: 2024-12-05 | End: 2024-12-11

## 2024-12-05 RX ORDER — 0.9 % SODIUM CHLORIDE 0.9 %
500 INTRAVENOUS SOLUTION INTRAVENOUS ONCE
Refills: 0 | Status: COMPLETED | OUTPATIENT
Start: 2024-12-05 | End: 2024-12-05

## 2024-12-05 RX ORDER — SENNOSIDES 8.6 MG
2 TABLET ORAL AT BEDTIME
Refills: 0 | Status: DISCONTINUED | OUTPATIENT
Start: 2024-12-05 | End: 2024-12-13

## 2024-12-05 RX ADMIN — ACETAMINOPHEN 500MG 400 MILLIGRAM(S): 500 TABLET, COATED ORAL at 17:35

## 2024-12-05 RX ADMIN — FENTANYL 25 MICROGRAM(S): 12 PATCH, EXTENDED RELEASE TRANSDERMAL at 22:50

## 2024-12-05 RX ADMIN — ACETAMINOPHEN 500MG 1000 MILLIGRAM(S): 500 TABLET, COATED ORAL at 17:50

## 2024-12-05 RX ADMIN — PIPERACILLIN SODIUM AND TAZOBACTAM SODIUM 25 GRAM(S): 4; .5 INJECTION, POWDER, LYOPHILIZED, FOR SOLUTION INTRAVENOUS at 14:23

## 2024-12-05 RX ADMIN — ACETAMINOPHEN 500MG 400 MILLIGRAM(S): 500 TABLET, COATED ORAL at 11:57

## 2024-12-05 RX ADMIN — Medication 166.67 MILLIGRAM(S): at 20:11

## 2024-12-05 RX ADMIN — POTASSIUM CHLORIDE 100 MILLIEQUIVALENT(S): 600 TABLET, EXTENDED RELEASE ORAL at 02:06

## 2024-12-05 RX ADMIN — PROPOFOL 4.51 MICROGRAM(S)/KG/MIN: 10 INJECTION, EMULSION INTRAVENOUS at 05:17

## 2024-12-05 RX ADMIN — PANTOPRAZOLE SODIUM 40 MILLIGRAM(S): 40 TABLET, DELAYED RELEASE ORAL at 11:05

## 2024-12-05 RX ADMIN — POTASSIUM CHLORIDE 100 MILLIEQUIVALENT(S): 600 TABLET, EXTENDED RELEASE ORAL at 02:48

## 2024-12-05 RX ADMIN — CHLORHEXIDINE GLUCONATE 1 APPLICATION(S): 1.2 RINSE ORAL at 22:40

## 2024-12-05 RX ADMIN — ACETAMINOPHEN 500MG 1000 MILLIGRAM(S): 500 TABLET, COATED ORAL at 12:27

## 2024-12-05 RX ADMIN — HUMAN INSULIN 3 UNIT(S): 100 INJECTION, SUSPENSION SUBCUTANEOUS at 06:49

## 2024-12-05 RX ADMIN — POTASSIUM CHLORIDE 100 MILLIEQUIVALENT(S): 600 TABLET, EXTENDED RELEASE ORAL at 16:53

## 2024-12-05 RX ADMIN — Medication 13 UNIT(S)/HR: at 19:29

## 2024-12-05 RX ADMIN — HUMAN INSULIN 3 UNIT(S): 100 INJECTION, SUSPENSION SUBCUTANEOUS at 01:43

## 2024-12-05 RX ADMIN — Medication 500 MILLILITER(S): at 06:03

## 2024-12-05 RX ADMIN — Medication 13 UNIT(S)/HR: at 04:42

## 2024-12-05 RX ADMIN — FENTANYL 25 MICROGRAM(S): 12 PATCH, EXTENDED RELEASE TRANSDERMAL at 23:50

## 2024-12-05 RX ADMIN — NOREPINEPHRINE BITARTRATE 7.04 MICROGRAM(S)/KG/MIN: 1 INJECTION, SOLUTION, CONCENTRATE INTRAVENOUS at 04:46

## 2024-12-05 RX ADMIN — FENTANYL 25 MICROGRAM(S): 12 PATCH, EXTENDED RELEASE TRANSDERMAL at 17:50

## 2024-12-05 RX ADMIN — LIDOCAINE 1 PATCH: 40 CREAM TOPICAL at 19:49

## 2024-12-05 RX ADMIN — LIDOCAINE 1 PATCH: 40 CREAM TOPICAL at 14:23

## 2024-12-05 RX ADMIN — POTASSIUM CHLORIDE 100 MILLIEQUIVALENT(S): 600 TABLET, EXTENDED RELEASE ORAL at 17:35

## 2024-12-05 RX ADMIN — Medication 13 UNIT(S)/HR: at 20:11

## 2024-12-05 RX ADMIN — DEXMEDETOMIDINE HYDROCHLORIDE 9.39 MICROGRAM(S)/KG/HR: 4 INJECTION, SOLUTION INTRAVENOUS at 04:29

## 2024-12-05 RX ADMIN — PIPERACILLIN SODIUM AND TAZOBACTAM SODIUM 25 GRAM(S): 4; .5 INJECTION, POWDER, LYOPHILIZED, FOR SOLUTION INTRAVENOUS at 05:17

## 2024-12-05 RX ADMIN — CHLORHEXIDINE GLUCONATE 1 APPLICATION(S): 1.2 RINSE ORAL at 22:39

## 2024-12-05 RX ADMIN — CHLORHEXIDINE GLUCONATE 15 MILLILITER(S): 1.2 RINSE ORAL at 05:17

## 2024-12-05 RX ADMIN — FENTANYL 25 MICROGRAM(S): 12 PATCH, EXTENDED RELEASE TRANSDERMAL at 17:35

## 2024-12-05 RX ADMIN — PIPERACILLIN SODIUM AND TAZOBACTAM SODIUM 25 GRAM(S): 4; .5 INJECTION, POWDER, LYOPHILIZED, FOR SOLUTION INTRAVENOUS at 22:38
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
## 2024-12-05 NOTE — PROGRESS NOTE ADULT - SUBJECTIVE AND OBJECTIVE BOX
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 effect   - 12/4 CTH: no acute hemorrhage or mass effect  - currently on propofol 10 and Precedex 1.5  - SAT in AM pending SBT  - continue to monitor mental status    dexMEDEtomidine Infusion 0.5 MICROgram(s)/kG/Hr (9.39 mL/Hr) IV Continuous <Continuous>  propofol Infusion 10 MICROgram(s)/kG/Min (4.51 mL/Hr) IV Continuous <Continuous>    ==================== RESPIRATORY======================  Mechanical Ventilation:  Mode: AC/ CMV (Assist Control/ Continuous Mandatory Ventilation)  RR (machine): 18  TV (machine): 400  FiO2: 40  PEEP: 5  ITime: 0.1  MAP: 14  PIP: 24    #acute hypoxic respiratory failure  - requiring intubation i/s/o massive PE  - 12/3 TTE: evidence of Seaman's sigh w/ moderate pHTN  - 12/3 repeat TTE: severely reduced RVSF, mod RVE, mod TR (PASP ~32 mmHg), dilated IVC  - 12/4 CTA PE: Intraluminal filling defects in right main, right interlobar artery, and lobar/segmental branches of pulmonary artery in all 5 lobes in keeping with pulmonary embolism. Consolidative opacities in the bilateral lower lobes, may represent atelectasis, pulmonary infarct, or a combination of both. RV:LV ratio of approximately 1.25.  - s/p 50 mg TPA 12/3 and repeat 50 mg 12/4  - c/w systemic Heparin gtt  - initially came in on Yamilet, weaned off 12/5 @ 0000  - SBT today AM on CPAP > extubate if tolerates  - monitor SpO2 > 92%    ====================CARDIOVASCULAR==================  #Acute RVF in setting of massive PE  - 12/3 TTE: evidence of Seaman's sigh w/ moderate pHTN  - 12/3 repeat TTE: severely reduced RVSF, mod RVE, mod TR (PASP ~32 mmHg), dilated IVC  - initially on milrinone 0.25, wean as tolerated after extubation given improved hemodynamic indices  - wean norepinephrine as tolerated for MAP > 65; currently 0.04  - trend CVP, MvO2, and lactate    milrinone Infusion 0.25 MICROgram(s)/kG/Min (5.63 mL/Hr) IV Continuous <Continuous>  norepinephrine Infusion 0.05 MICROgram(s)/kG/Min (7.04 mL/Hr) IV Continuous <Continuous>    ===================== RENAL =========================  #No active issues  - Cr within normal limits, trend daily  - strict I/Os    Total NET: 1912.5 mL    ==================== GASTROINTESTINAL===================  #NPO  - OGT to wall suction  - monitor for BM  - c/w PPI    #Diet  - pending extubation, bedside dysphagia > advance diet as tolerated    pantoprazole  Injectable 40 milliGRAM(s) IV Push daily  sodium chloride 0.9% lock flush 10 milliLiter(s) IV Push every 1 hour PRN Pre/post blood products, medications, blood draw, and to maintain line patency    =======================    ENDOCRINE  =====================  #Hyperglycemia  - likely stress related  - A1c 5.9  - RESOLVED    insulin lispro (ADMELOG) corrective regimen sliding scale   SubCutaneous three times a day before meals    ===================HEMATOLOGIC/ONC ===================  #Anemia, multifactorial   - H/H and platelets stable  - trend daily    #LLE Acute DVT, PE  - s/p tPA 12/3 and 12/4, now on systemic heparin per protocol   - 12/3 VA Duplex LLE: Interval resolution of L fem DVT, persistent    #DVT ppx: as above    heparin  Infusion 1300 Unit(s)/Hr (13 mL/Hr) IV Continuous <Continuous>    ========================INFECTIOUS DISEASE================  #RLE Cellulitis   - s/p bedside debridement and wound vac placement on 11/27  - f/u blood cultures 12/4  - continue zosyn  - general surgery recommend PT wound care for wound vac management, no acute surgical intervention  - trend wbc and fever curve; febrile to 100.6 F overnight    piperacillin/tazobactam IVPB.. 3.375 Gram(s) IV Intermittent every 8 hours    ========================LINES================  Lines: RIJ PAC 12/4 -   L axillary lisa 12/4 -

## 2024-12-05 NOTE — PROGRESS NOTE ADULT - SUBJECTIVE AND OBJECTIVE BOX
TEAM [ ACS ] Surgery Daily Progress Note  =====================================================  SUBJECTIVE: Patient seen and examined at bedside on AM rounds. Pt intubated; subjective limited. Pt on Propofol 10, precedex 1.5, zosyn, hep gtt, milrinone .25 and levo .05. Vac on RLE working appropriately.     PMH:   PAST MEDICAL & SURGICAL HISTORY:  Hypertension    H/O cervical fracture    History of pelvic fracture    ALLERGIES:  No Known Allergies  --------------------------------------------------------------------------------------  MEDICATIONS:    Neurologic Medications  dexMEDEtomidine Infusion 0.5 MICROgram(s)/kG/Hr IV Continuous <Continuous>  propofol Infusion 10 MICROgram(s)/kG/Min IV Continuous <Continuous>    Respiratory Medications    Cardiovascular Medications  milrinone Infusion 0.25 MICROgram(s)/kG/Min IV Continuous <Continuous>  norepinephrine Infusion 0.05 MICROgram(s)/kG/Min IV Continuous <Continuous>    Gastrointestinal Medications  pantoprazole  Injectable 40 milliGRAM(s) IV Push daily  sodium chloride 0.9% lock flush 10 milliLiter(s) IV Push every 1 hour PRN Pre/post blood products, medications, blood draw, and to maintain line patency    Genitourinary Medications    Hematologic/Oncologic Medications  heparin  Infusion 1300 Unit(s)/Hr IV Continuous <Continuous>    Antimicrobial/Immunologic Medications  piperacillin/tazobactam IVPB.. 3.375 Gram(s) IV Intermittent every 8 hours    Endocrine/Metabolic Medications  insulin lispro (ADMELOG) corrective regimen sliding scale   SubCutaneous three times a day before meals    Topical/Other Medications  chlorhexidine 0.12% Liquid 15 milliLiter(s) Oral Mucosa every 12 hours  chlorhexidine 2% Cloths 1 Application(s) Topical <User Schedule>  chlorhexidine 4% Liquid 1 Application(s) Topical <User Schedule>  --------------------------------------------------------------------------------------  VITAL SIGNS:    T(C): 36.7 (12-05-24 @ 04:00), Max: 39.2 (12-04-24 @ 12:00)  HR: 84 (12-05-24 @ 06:45) (60 - 104)  BP: --  RR: 18 (12-05-24 @ 06:45) (18 - 31)  SpO2: 99% (12-05-24 @ 06:45) (95% - 100%)  --------------------------------------------------------------------------------------  EXAM    General: Sedated  Pulmonary: Intubated  Cardiovascular: On Levo 0.05, Milrinone 0.25  Extremities: Bilateral lower extremities warm, RLE medial calf wound vac in place, holding adequate suction, no cellulitic changes or drainage. Mild erythema around vac site.   --------------------------------------------------------------------------------------  LABS                        9.5    15.34 )-----------( 268      ( 05 Dec 2024 01:12 )             29.7   12-05    135  |  105  |  13  ----------------------------<  127[H]  3.3[L]   |  16[L]  |  0.64    Ca    8.3[L]      05 Dec 2024 01:13  Phos  3.0     12-05  Mg     2.2     12-05    TPro  5.4[L]  /  Alb  2.7[L]  /  TBili  1.0  /  DBili  x   /  AST  48[H]  /  ALT  54[H]  /  AlkPhos  113  12-05    --------------------------------------------------------------------------------------  INS AND OUTS:    12-04-24 @ 07:01  -  12-05-24 @ 07:00  --------------------------------------------------------  IN: 2872.5 mL / OUT: 960 mL / NET: 1912.5 mL    --------------------------------------------------------------------------------------

## 2024-12-05 NOTE — PATIENT PROFILE ADULT - FALL HARM RISK - HARM RISK INTERVENTIONS
Assistance with ambulation/Assistance OOB with selected safe patient handling equipment/Communicate Risk of Fall with Harm to all staff/Discuss with provider need for PT consult/Monitor gait and stability/Reinforce activity limits and safety measures with patient and family/Sit up slowly, dangle for a short time, stand at bedside before walking/Tailored Fall Risk Interventions/Use of alarms - bed, chair and/or voice tab/Visual Cue: Yellow wristband and red socks/Bed in lowest position, wheels locked, appropriate side rails in place/Call bell, personal items and telephone in reach/Instruct patient to call for assistance before getting out of bed or chair/Non-slip footwear when patient is out of bed/[place] to call system/Physically safe environment - no spills, clutter or unnecessary equipment/Purposeful Proactive Rounding/Room/bathroom lighting operational, light cord in reach

## 2024-12-05 NOTE — PROGRESS NOTE ADULT - SUBJECTIVE AND OBJECTIVE BOX
PATIENT:  CHADD GARNETT  [id number]    CHIEF COMPLAINT:  Patient is a 69y old  Male who presents with a chief complaint of PE (05 Dec 2024 14:39)      INTERVAL HISTORY: extubated to nasal cannula. weaned off vasopressors and inotropes    REVIEW OF SYSTEMS:  Constitutional:     [ ] negative [ ] fevers [ ] chills [ ] weight loss [ ] weight gain  HEENT:                  [ ] negative [ ] dry eyes [ ] eye irritation [ ] postnasal drip [ ] nasal congestion  CV:                         [ ] negative  [ ] chest pain [ ] orthopnea [ ] palpitations [ ] murmur  Resp:                     [ ] negative [ ] cough [ ] shortness of breath [ ] dyspnea [ ] wheezing [ ] sputum [ ] hemoptysis  GI:                          [ ] negative [ ] nausea [ ] vomiting [ ] diarrhea [ ] constipation [ ] abd pain [ ] dysphagia   :                        [ ] negative [ ] dysuria [ ] nocturia [ ] hematuria [ ] increased urinary frequency  Musculoskeletal: [ ] negative [ ] back pain [ ] myalgias [ ] arthralgias [ ] fracture  Skin:                       [ ] negative [ ] rash [ ] itch  Neurological:        [ ] negative [ ] headache [ ] dizziness [ ] syncope [ ] weakness [ ] numbness    MEDICATIONS:  MEDICATIONS  (STANDING):  chlorhexidine 2% Cloths 1 Application(s) Topical <User Schedule>  chlorhexidine 4% Liquid 1 Application(s) Topical <User Schedule>  heparin  Infusion 1300 Unit(s)/Hr (13 mL/Hr) IV Continuous <Continuous>  pantoprazole  Injectable 40 milliGRAM(s) IV Push daily  piperacillin/tazobactam IVPB.. 3.375 Gram(s) IV Intermittent every 8 hours  vancomycin  IVPB 1250 milliGRAM(s) IV Intermittent every 12 hours    MEDICATIONS  (PRN):  sodium chloride 0.9% lock flush 10 milliLiter(s) IV Push every 1 hour PRN Pre/post blood products, medications, blood draw, and to maintain line patency    ALLERGIES:  Allergies  No Known Allergies  Intolerances    OBJECTIVE:  ICU Vital Signs Last 24 Hrs  T(C): 37.6 (05 Dec 2024 19:00), Max: 38.1 (04 Dec 2024 23:00)  T(F): 99.7 (05 Dec 2024 19:00), Max: 100.6 (04 Dec 2024 23:00)  HR: 91 (05 Dec 2024 19:30) (68 - 103)  BP: --  BP(mean): --  ABP: 117/59 (05 Dec 2024 19:30) (88/47 - 135/70)  ABP(mean): 81 (05 Dec 2024 19:30) (64 - 96)  RR: 18 (05 Dec 2024 19:30) (15 - 30)  SpO2: 99% (05 Dec 2024 19:30) (95% - 100%)    O2 Parameters below as of 05 Dec 2024 19:30  Patient On (Oxygen Delivery Method): nasal cannula  O2 Flow (L/min): 2    Mode: CPAP with PS  FiO2: 40  PEEP: 5  PS: 5  MAP: 7  PIP: 10    Adult Advanced Hemodynamics Last 24 Hrs  CVP(mm Hg): 0 (05 Dec 2024 19:30) (-1 - 10)  CVP(cm H2O): --  CO: 6.6 (05 Dec 2024 17:00) (4.2 - 7)  CI: 3.5 (05 Dec 2024 17:00) (2.2 - 3.8)  PA: 35/7 (05 Dec 2024 19:30) (14/8 - 56/19)  PA(mean): 16 (05 Dec 2024 19:30) (12 - 35)  PCWP: --  SVR: 1483 (05 Dec 2024 00:30) (1483 - 1483)  SVRI: 2832 (05 Dec 2024 00:30) (2832 - 2832)  PVR: --  PVRI: --  CAPILLARY BLOOD GLUCOSE    POCT Blood Glucose.: 123 mg/dL (05 Dec 2024 06:47)  POCT Blood Glucose.: 134 mg/dL (05 Dec 2024 01:40)    CAPILLARY BLOOD GLUCOSE    POCT Blood Glucose.: 123 mg/dL (05 Dec 2024 06:47)    I&O's Summary    04 Dec 2024 07:01  -  05 Dec 2024 07:00  --------------------------------------------------------  IN: 2872.5 mL / OUT: 960 mL / NET: 1912.5 mL    05 Dec 2024 07:01  -  05 Dec 2024 20:48  --------------------------------------------------------  IN: 1356.2 mL / OUT: 590 mL / NET: 766.2 mL    Daily     Daily     PHYSICAL EXAMINATION:  General: WN/WD NAD  HEENT: PERRLA, EOMI, moist mucous membranes  Neurology: A&Ox3, nonfocal, CABA x 4  Respiratory: CTA B/L, normal respiratory effort, no wheezes, crackles, rales  CV: RRR, S1S2, no murmurs, rubs or gallops  Abdominal: Soft, NT, ND +BS, Last BM  Extremities: No edema, + peripheral pulses  skin:     LABS:  ABG - ( 05 Dec 2024 17:05 )  pH, Arterial: 7.41  pH, Blood: x     /  pCO2: 33    /  pO2: 105   / HCO3: 21    / Base Excess: -3.2  /  SaO2: 99.1                      8.9    16.83 )-----------( 188      ( 05 Dec 2024 14:27 )             27.6     12-05    136  |  105  |  15  ----------------------------<  104[H]  3.8   |  18[L]  |  0.71    Ca    8.2[L]      05 Dec 2024 14:27  Phos  3.0     12-05  Mg     2.2     12-05    TPro  5.2[L]  /  Alb  2.5[L]  /  TBili  0.8  /  DBili  x   /  AST  37  /  ALT  43  /  AlkPhos  109  12-05    LIVER FUNCTIONS - ( 05 Dec 2024 14:27 )  Alb: 2.5 g/dL / Pro: 5.2 g/dL / ALK PHOS: 109 U/L / ALT: 43 U/L / AST: 37 U/L / GGT: x           PT/INR - ( 05 Dec 2024 01:13 )   PT: 14.5 sec;   INR: 1.27 ratio         PTT - ( 05 Dec 2024 01:13 )  PTT:65.1 sec    Urinalysis Basic - ( 05 Dec 2024 14:27 )    Color: x / Appearance: x / SG: x / pH: x  Gluc: 104 mg/dL / Ketone: x  / Bili: x / Urobili: x   Blood: x / Protein: x / Nitrite: x   Leuk Esterase: x / RBC: x / WBC x   Sq Epi: x / Non Sq Epi: x / Bacteria: x      TELEMETRY:     EKG:     IMAGING:  < from: TTE W or WO Ultrasound Enhancing Agent (12.03.24 @ 13:19) >  CONCLUSIONS:    Follow up echo to specifically assess RV size/function post tPA.      1. Technically difficult image quality.   2. Moderately enlarged right ventricular cavity size and severely reduced right ventricular systolic function.   3. Right ventricular systolic function is reduced with apical sparing--similar to prior study.   4. Moderate tricuspid regurgitation.   5. Estimated pulmonary artery systolic pressure is 32 mmHg.   6. The inferior vena cava is dilated measuring 2.56 cm in diameter, (dilated >2.1cm) with abnormal inspiratory collapse (abnormal <50%) consistent with elevated right atrial pressure (~15, range 10-20mmHg).   7. Left ventricle was not well visualized.   8. Compared to the transthoracic echocardiogram performed on 12/3/2024, PA pressure has decreased. RV size is slightly smaller but systolic function remains severely depressed.    < end of copied text >

## 2024-12-05 NOTE — PHYSICAL THERAPY INITIAL EVALUATION ADULT - MODALITIES TREATMENT COMMENTS
Pt presents with RLE wound s/p debridement for cellulitis (11/27). RLE wound appears red and granulating 80% and some slough 20%. Wound measures 5.0cm x 3.0cm x 1.0cm. Cavilon to periwound. Wound redressed with black sponge to 125mmHg with good seal in place.
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
## 2024-12-05 NOTE — AIRWAY REMOVAL NOTE  ADULT & PEDS - ARTIFICAL AIRWAY REMOVAL COMMENTS
Written order for extubation verified. The patient was identified by full name and birth date compared to the identification band. Present during the procedure was  Jacinta MERINO).
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
## 2024-12-06 DIAGNOSIS — Z29.9 ENCOUNTER FOR PROPHYLACTIC MEASURES, UNSPECIFIED: ICD-10-CM

## 2024-12-06 DIAGNOSIS — A41.9 SEPSIS, UNSPECIFIED ORGANISM: ICD-10-CM

## 2024-12-06 DIAGNOSIS — I50.811 ACUTE RIGHT HEART FAILURE: ICD-10-CM

## 2024-12-06 DIAGNOSIS — D64.9 ANEMIA, UNSPECIFIED: ICD-10-CM

## 2024-12-06 DIAGNOSIS — S12.9XXA FRACTURE OF NECK, UNSPECIFIED, INITIAL ENCOUNTER: ICD-10-CM

## 2024-12-06 DIAGNOSIS — K59.00 CONSTIPATION, UNSPECIFIED: ICD-10-CM

## 2024-12-06 DIAGNOSIS — J96.01 ACUTE RESPIRATORY FAILURE WITH HYPOXIA: ICD-10-CM

## 2024-12-06 DIAGNOSIS — R57.0 CARDIOGENIC SHOCK: ICD-10-CM

## 2024-12-06 DIAGNOSIS — I26.99 OTHER PULMONARY EMBOLISM WITHOUT ACUTE COR PULMONALE: ICD-10-CM

## 2024-12-06 DIAGNOSIS — L03.90 CELLULITIS, UNSPECIFIED: ICD-10-CM

## 2024-12-06 DIAGNOSIS — S32.9XXA FRACTURE OF UNSPECIFIED PARTS OF LUMBOSACRAL SPINE AND PELVIS, INITIAL ENCOUNTER FOR CLOSED FRACTURE: ICD-10-CM

## 2024-12-06 DIAGNOSIS — I10 ESSENTIAL (PRIMARY) HYPERTENSION: ICD-10-CM

## 2024-12-06 DIAGNOSIS — M54.9 DORSALGIA, UNSPECIFIED: ICD-10-CM

## 2024-12-06 DIAGNOSIS — I82.409 ACUTE EMBOLISM AND THROMBOSIS OF UNSPECIFIED DEEP VEINS OF UNSPECIFIED LOWER EXTREMITY: ICD-10-CM

## 2024-12-06 LAB
ALBUMIN SERPL ELPH-MCNC: 2.3 G/DL — LOW (ref 3.3–5)
ALP SERPL-CCNC: 106 U/L — SIGNIFICANT CHANGE UP (ref 40–120)
ALT FLD-CCNC: 36 U/L — SIGNIFICANT CHANGE UP (ref 10–45)
ANION GAP SERPL CALC-SCNC: 12 MMOL/L — SIGNIFICANT CHANGE UP (ref 5–17)
APTT BLD: 63.7 SEC — HIGH (ref 24.5–35.6)
AST SERPL-CCNC: 26 U/L — SIGNIFICANT CHANGE UP (ref 10–40)
BASE EXCESS BLDMV CALC-SCNC: -2.8 MMOL/L — SIGNIFICANT CHANGE UP (ref -3–3)
BASOPHILS # BLD AUTO: 0.05 K/UL — SIGNIFICANT CHANGE UP (ref 0–0.2)
BASOPHILS NFR BLD AUTO: 0.3 % — SIGNIFICANT CHANGE UP (ref 0–2)
BILIRUB SERPL-MCNC: 0.8 MG/DL — SIGNIFICANT CHANGE UP (ref 0.2–1.2)
BUN SERPL-MCNC: 12 MG/DL — SIGNIFICANT CHANGE UP (ref 7–23)
CALCIUM SERPL-MCNC: 7.9 MG/DL — LOW (ref 8.4–10.5)
CHLORIDE SERPL-SCNC: 103 MMOL/L — SIGNIFICANT CHANGE UP (ref 96–108)
CO2 BLDMV-SCNC: 23 MMOL/L — SIGNIFICANT CHANGE UP (ref 21–29)
CO2 SERPL-SCNC: 18 MMOL/L — LOW (ref 22–31)
CREAT SERPL-MCNC: 0.68 MG/DL — SIGNIFICANT CHANGE UP (ref 0.5–1.3)
CULTURE RESULTS: NO GROWTH
EGFR: 101 ML/MIN/1.73M2 — SIGNIFICANT CHANGE UP
EOSINOPHIL # BLD AUTO: 0.09 K/UL — SIGNIFICANT CHANGE UP (ref 0–0.5)
EOSINOPHIL NFR BLD AUTO: 0.6 % — SIGNIFICANT CHANGE UP (ref 0–6)
GAS PNL BLDA: SIGNIFICANT CHANGE UP
GAS PNL BLDMV: SIGNIFICANT CHANGE UP
GLUCOSE SERPL-MCNC: 100 MG/DL — HIGH (ref 70–99)
HCO3 BLDMV-SCNC: 22 MMOL/L — SIGNIFICANT CHANGE UP (ref 20–28)
HCT VFR BLD CALC: 27.1 % — LOW (ref 39–50)
HGB BLD-MCNC: 8.7 G/DL — LOW (ref 13–17)
IMM GRANULOCYTES NFR BLD AUTO: 1.1 % — HIGH (ref 0–0.9)
INR BLD: 1.33 RATIO — HIGH (ref 0.85–1.16)
LYMPHOCYTES # BLD AUTO: 1.27 K/UL — SIGNIFICANT CHANGE UP (ref 1–3.3)
LYMPHOCYTES # BLD AUTO: 8.4 % — LOW (ref 13–44)
MAGNESIUM SERPL-MCNC: 1.8 MG/DL — SIGNIFICANT CHANGE UP (ref 1.6–2.6)
MCHC RBC-ENTMCNC: 28.9 PG — SIGNIFICANT CHANGE UP (ref 27–34)
MCHC RBC-ENTMCNC: 32.1 G/DL — SIGNIFICANT CHANGE UP (ref 32–36)
MCV RBC AUTO: 90 FL — SIGNIFICANT CHANGE UP (ref 80–100)
MONOCYTES # BLD AUTO: 1.3 K/UL — HIGH (ref 0–0.9)
MONOCYTES NFR BLD AUTO: 8.6 % — SIGNIFICANT CHANGE UP (ref 2–14)
NEUTROPHILS # BLD AUTO: 12.27 K/UL — HIGH (ref 1.8–7.4)
NEUTROPHILS NFR BLD AUTO: 81 % — HIGH (ref 43–77)
NRBC # BLD: 0 /100 WBCS — SIGNIFICANT CHANGE UP (ref 0–0)
O2 CT VFR BLD CALC: 41 MMHG — SIGNIFICANT CHANGE UP (ref 30–65)
PCO2 BLDMV: 37 MMHG — SIGNIFICANT CHANGE UP (ref 30–65)
PH BLDMV: 7.38 — SIGNIFICANT CHANGE UP (ref 7.32–7.45)
PHOSPHATE SERPL-MCNC: 3.4 MG/DL — SIGNIFICANT CHANGE UP (ref 2.5–4.5)
PLATELET # BLD AUTO: 158 K/UL — SIGNIFICANT CHANGE UP (ref 150–400)
POTASSIUM SERPL-MCNC: 3.5 MMOL/L — SIGNIFICANT CHANGE UP (ref 3.5–5.3)
POTASSIUM SERPL-SCNC: 3.5 MMOL/L — SIGNIFICANT CHANGE UP (ref 3.5–5.3)
PROT SERPL-MCNC: 5.2 G/DL — LOW (ref 6–8.3)
PROTHROM AB SERPL-ACNC: 15.3 SEC — HIGH (ref 9.9–13.4)
RBC # BLD: 3.01 M/UL — LOW (ref 4.2–5.8)
RBC # FLD: 14.6 % — HIGH (ref 10.3–14.5)
SAO2 % BLDMV: 71.2 — SIGNIFICANT CHANGE UP (ref 60–90)
SODIUM SERPL-SCNC: 133 MMOL/L — LOW (ref 135–145)
SPECIMEN SOURCE: SIGNIFICANT CHANGE UP
VANCOMYCIN TROUGH SERPL-MCNC: 10.7 UG/ML — SIGNIFICANT CHANGE UP (ref 10–20)
WBC # BLD: 15.15 K/UL — HIGH (ref 3.8–10.5)
WBC # FLD AUTO: 15.15 K/UL — HIGH (ref 3.8–10.5)

## 2024-12-06 PROCEDURE — 99233 SBSQ HOSP IP/OBS HIGH 50: CPT

## 2024-12-06 PROCEDURE — 99223 1ST HOSP IP/OBS HIGH 75: CPT | Mod: GC

## 2024-12-06 PROCEDURE — 93010 ELECTROCARDIOGRAM REPORT: CPT

## 2024-12-06 PROCEDURE — 99291 CRITICAL CARE FIRST HOUR: CPT | Mod: GC,25

## 2024-12-06 PROCEDURE — 99231 SBSQ HOSP IP/OBS SF/LOW 25: CPT

## 2024-12-06 PROCEDURE — 71045 X-RAY EXAM CHEST 1 VIEW: CPT | Mod: 26

## 2024-12-06 PROCEDURE — 74018 RADEX ABDOMEN 1 VIEW: CPT | Mod: 26

## 2024-12-06 RX ORDER — ACETAMINOPHEN 500MG 500 MG/1
975 TABLET, COATED ORAL EVERY 8 HOURS
Refills: 0 | Status: DISCONTINUED | OUTPATIENT
Start: 2024-12-06 | End: 2024-12-13

## 2024-12-06 RX ORDER — LIDOCAINE 40 MG/G
15 CREAM TOPICAL ONCE
Refills: 0 | Status: COMPLETED | OUTPATIENT
Start: 2024-12-06 | End: 2024-12-06

## 2024-12-06 RX ORDER — OXYCODONE HYDROCHLORIDE 30 MG/1
10 TABLET ORAL EVERY 6 HOURS
Refills: 0 | Status: DISCONTINUED | OUTPATIENT
Start: 2024-12-06 | End: 2024-12-12

## 2024-12-06 RX ORDER — SIMETHICONE 125 MG
80 CAPSULE ORAL
Refills: 0 | Status: DISCONTINUED | OUTPATIENT
Start: 2024-12-06 | End: 2024-12-13

## 2024-12-06 RX ORDER — LIDOCAINE 40 MG/G
1 CREAM TOPICAL EVERY 24 HOURS
Refills: 0 | Status: DISCONTINUED | OUTPATIENT
Start: 2024-12-06 | End: 2024-12-10

## 2024-12-06 RX ORDER — OXYCODONE HYDROCHLORIDE 30 MG/1
5 TABLET ORAL EVERY 6 HOURS
Refills: 0 | Status: DISCONTINUED | OUTPATIENT
Start: 2024-12-06 | End: 2024-12-12

## 2024-12-06 RX ORDER — ACETAMINOPHEN 500MG 500 MG/1
1000 TABLET, COATED ORAL ONCE
Refills: 0 | Status: COMPLETED | OUTPATIENT
Start: 2024-12-06 | End: 2024-12-06

## 2024-12-06 RX ORDER — ACETAMINOPHEN, DIPHENHYDRAMINE HCL, PHENYLEPHRINE HCL 325; 25; 5 MG/1; MG/1; MG/1
1 TABLET ORAL AT BEDTIME
Refills: 0 | Status: DISCONTINUED | OUTPATIENT
Start: 2024-12-06 | End: 2024-12-13

## 2024-12-06 RX ORDER — POTASSIUM CHLORIDE 600 MG/1
20 TABLET, EXTENDED RELEASE ORAL
Refills: 0 | Status: COMPLETED | OUTPATIENT
Start: 2024-12-06 | End: 2024-12-06

## 2024-12-06 RX ADMIN — LIDOCAINE 1 PATCH: 40 CREAM TOPICAL at 18:09

## 2024-12-06 RX ADMIN — Medication 2 MILLIGRAM(S): at 15:18

## 2024-12-06 RX ADMIN — ACETAMINOPHEN 500MG 400 MILLIGRAM(S): 500 TABLET, COATED ORAL at 08:35

## 2024-12-06 RX ADMIN — Medication 166.67 MILLIGRAM(S): at 07:41

## 2024-12-06 RX ADMIN — POTASSIUM CHLORIDE 100 MILLIEQUIVALENT(S): 600 TABLET, EXTENDED RELEASE ORAL at 03:19

## 2024-12-06 RX ADMIN — Medication 2 TABLET(S): at 21:07

## 2024-12-06 RX ADMIN — LIDOCAINE 1 PATCH: 40 CREAM TOPICAL at 02:00

## 2024-12-06 RX ADMIN — Medication 2 MILLIGRAM(S): at 15:48

## 2024-12-06 RX ADMIN — ACETAMINOPHEN 500MG 975 MILLIGRAM(S): 500 TABLET, COATED ORAL at 21:06

## 2024-12-06 RX ADMIN — ACETAMINOPHEN 500MG 1000 MILLIGRAM(S): 500 TABLET, COATED ORAL at 09:00

## 2024-12-06 RX ADMIN — POTASSIUM CHLORIDE 100 MILLIEQUIVALENT(S): 600 TABLET, EXTENDED RELEASE ORAL at 02:15

## 2024-12-06 RX ADMIN — ACETAMINOPHEN 500MG 400 MILLIGRAM(S): 500 TABLET, COATED ORAL at 02:15

## 2024-12-06 RX ADMIN — ACETAMINOPHEN 500MG 1000 MILLIGRAM(S): 500 TABLET, COATED ORAL at 03:15

## 2024-12-06 RX ADMIN — PIPERACILLIN SODIUM AND TAZOBACTAM SODIUM 25 GRAM(S): 4; .5 INJECTION, POWDER, LYOPHILIZED, FOR SOLUTION INTRAVENOUS at 21:16

## 2024-12-06 RX ADMIN — OXYCODONE HYDROCHLORIDE 5 MILLIGRAM(S): 30 TABLET ORAL at 18:09

## 2024-12-06 RX ADMIN — Medication 166.67 MILLIGRAM(S): at 18:38

## 2024-12-06 RX ADMIN — PIPERACILLIN SODIUM AND TAZOBACTAM SODIUM 25 GRAM(S): 4; .5 INJECTION, POWDER, LYOPHILIZED, FOR SOLUTION INTRAVENOUS at 14:08

## 2024-12-06 RX ADMIN — Medication 25 GRAM(S): at 04:55

## 2024-12-06 RX ADMIN — LIDOCAINE 15 MILLILITER(S): 40 CREAM TOPICAL at 21:07

## 2024-12-06 RX ADMIN — PIPERACILLIN SODIUM AND TAZOBACTAM SODIUM 25 GRAM(S): 4; .5 INJECTION, POWDER, LYOPHILIZED, FOR SOLUTION INTRAVENOUS at 05:31

## 2024-12-06 RX ADMIN — ACETAMINOPHEN, DIPHENHYDRAMINE HCL, PHENYLEPHRINE HCL 1 MILLIGRAM(S): 325; 25; 5 TABLET ORAL at 23:53

## 2024-12-06 RX ADMIN — OXYCODONE HYDROCHLORIDE 5 MILLIGRAM(S): 30 TABLET ORAL at 19:09

## 2024-12-06 RX ADMIN — Medication 13 UNIT(S)/HR: at 07:41

## 2024-12-06 NOTE — PROGRESS NOTE ADULT - PROBLEM SELECTOR PLAN 6
Diet: Soft and bite sized after extubation, advance as tolerated  DVT ppx: on heparin drip for PEs and DVT  Dispo: pending clinical course
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
## 2024-12-06 NOTE — PROGRESS NOTE ADULT - PROBLEM SELECTOR PLAN 1
#Acute RVF in setting of massive Pulmonary Embolism  #LLE DVT  CTAP on 11/25 demonstrated deep vein thrombosis involving the left external iliac, common femoral   and superficial femoral veins as well as partially visualized lobar and segmental pulmonary thromboembolism  CTAPE on 12/4 showed Intraluminal filling defects in right main, right interlobar artery, and   lobar/segmental branches of pulmonary artery in all 5 lobes in keeping with pulmonary embolism. Consolidative opacities in the bilateral lower lobes, may represent atelectasis, pulmonary infarct, or a combination of both.    - hypercoagulable workup due to PE occurrence while on therapeutic lovenox  - continue heparin gtt  - continue to follow vascular cardiology recommendations

## 2024-12-06 NOTE — PROGRESS NOTE ADULT - NS ATTEND AMEND GEN_ALL_CORE FT
High risk PE, s/p 50mg tpa x2, extubated yesterday, now on RA.   Likely provoked in the setting of immobility from pelvic and cervical fractures; likely the LLE proximal DVT propagated to lungs, causing decompensation (not Lovenox failure).  HDS. Biggest complaint is abdominal pain and pain of back/shoulder/RLE.    -heparin ggt  -hypercoag work-up as above; if LA negative, can transition to DOAC  -CT venogram Abd/Pelvis as part of age appropriate malignancy work-up

## 2024-12-06 NOTE — PROGRESS NOTE ADULT - SUBJECTIVE AND OBJECTIVE BOX
Vascular Cardiology Progress Note    DIRECT PROVIDER NUMBER: [phone] / Available on TEAMS    CC:  PE    Interval Events:   Stepped down to 6 TOW.  Reports feeling improved.  No current C/P or SOB.   R LE remains with wound-vac.  Patient's son at bedside translating.    Allergies  No Known Allergies    MEDICATIONS:  heparin  Infusion 1300 Unit(s)/Hr IV Continuous <Continuous>  piperacillin/tazobactam IVPB.. 3.375 Gram(s) IV Intermittent every 8 hours  vancomycin  IVPB 1250 milliGRAM(s) IV Intermittent every 12 hours  acetaminophen     Tablet .. 975 milliGRAM(s) Oral every 8 hours  oxyCODONE    IR 5 milliGRAM(s) Oral every 6 hours PRN  oxyCODONE    IR 10 milliGRAM(s) Oral every 6 hours PRN  polyethylene glycol 3350 17 Gram(s) Oral daily  senna 2 Tablet(s) Oral at bedtime  chlorhexidine 2% Cloths 1 Application(s) Topical <User Schedule>  chlorhexidine 4% Liquid 1 Application(s) Topical <User Schedule>    PAST MEDICAL & SURGICAL HISTORY:  Hypertension  H/O cervical fracture  History of pelvic fracture    FAMILY HISTORY:  No pertinent family history in first degree relatives    SOCIAL HISTORY:  unchanged    REVIEW OF SYSTEMS:  CONSTITUTIONAL: No fever  EYES: No eye pain  ENT:  No throat pain  NECK: No pain   RESPIRATORY: No current SOB  CARDIOVASCULAR: No C/P  GASTROINTESTINAL: No abdominal pain  GENITOURINARY: No hematuria  NEUROLOGICAL: No memory loss  LYMPH Nodes: No enlarged glands noted  ENDOCRINE: No heat or cold intolerance noted  MUSCULOSKELETAL: R LE with wound-vac  PSYCHIATRIC: No depression, anxiety  HEME/LYMPH: No bleeding gums  ALLERGY AND IMMUNOLOGIC: No hives    [ x] All others negative	    PHYSICAL EXAM:  T(C): 37.2 (12-06-24 @ 11:58), Max: 37.6 (12-05-24 @ 19:00)  HR: 99 (12-06-24 @ 11:58) (77 - 99)  BP: 150/88 (12-06-24 @ 11:58) (137/86 - 150/88)  RR: 18 (12-06-24 @ 11:58) (15 - 30)  SpO2: 98% (12-06-24 @ 11:58) (93% - 100%)  Wt(kg): --  I&O's Summary    05 Dec 2024 07:01  -  06 Dec 2024 07:00  --------------------------------------------------------  IN: 1899.2 mL / OUT: 1490 mL / NET: 409.2 mL    06 Dec 2024 07:01  -  06 Dec 2024 16:22  --------------------------------------------------------  IN: 439 mL / OUT: 1600 mL / NET: -1161 mL    Appearance:  NAD	  HEENT:  NC/AT  Cardiovascular: RRR, S1 and S2  Respiratory: CTA B/L  Gastrointestinal:  Soft, Non-tender, + BS	  Skin: No cyanosis	  Extremities: R LE wound-vac, bilateral calves soft   Vascular Pulse Exam: Palpable pedal pulses bilaterally      LABS:	 	  CBC Full  -  ( 06 Dec 2024 00:51 )  WBC Count : 15.15 K/uL  Hemoglobin : 8.7 g/dL  Hematocrit : 27.1 %  Platelet Count - Automated : 158 K/uL  Mean Cell Volume : 90.0 fl  Mean Cell Hemoglobin : 28.9 pg  Mean Cell Hemoglobin Concentration : 32.1 g/dL  Auto Neutrophil # : 12.27 K/uL  Auto Lymphocyte # : 1.27 K/uL  Auto Monocyte # : 1.30 K/uL  Auto Eosinophil # : 0.09 K/uL  Auto Basophil # : 0.05 K/uL  Auto Neutrophil % : 81.0 %  Auto Lymphocyte % : 8.4 %  Auto Monocyte % : 8.6 %  Auto Eosinophil % : 0.6 %  Auto Basophil % : 0.3 %    12-06    133[L]  |  103  |  12  ----------------------------<  100[H]  3.5   |  18[L]  |  0.68  12-05    136  |  105  |  15  ----------------------------<  104[H]  3.8   |  18[L]  |  0.71    Ca    7.9[L]      06 Dec 2024 00:51  Ca    8.2[L]      05 Dec 2024 14:27  Phos  3.4     12-06  Phos  3.0     12-05  Mg     1.8     12-06  Mg     2.2     12-05    TPro  5.2[L]  /  Alb  2.3[L]  /  TBili  0.8  /  DBili  x   /  AST  26  /  ALT  36  /  AlkPhos  106  12-06  TPro  5.2[L]  /  Alb  2.5[L]  /  TBili  0.8  /  DBili  x   /  AST  37  /  ALT  43  /  AlkPhos  109  12-05      Assessment:  1. High Risk PE provoked in the setting of pelvic and cervical fractures, immobility which happened on therapeutic a/c; likely that large burden proximal DVT embolized to the lungs, causing decompensation    2. s/p half dose tPA x2, extubated and weaned off pressors  3. Wound vac to R LE wound    Plan:  1. Continue Heparin gtt. Maintain therapeutic PTT.  2. Check Beta 2 Glycoprotein, Silica Clotting Time, Anticardiolipin, Dilute Axel Viper Venom Time, Factor V Leiden, Prothrombin Gene Mutation.  3. Troponin down-trended. Lactate cleared. Trend BNP.  4. Recommend CT venogram abdomen / pelvis w/ IV contrast. For malignancy screening and to assess central veins.   5. If APLS labs are negative and no acute findings on CT abdomen / pelvis, plan to transition to DOAC (whichever is covered by insurance with dosing as per VTE guidelines).   6. As outpatient age appropriate malignancy screening.   7. Patient clinically improved. Continue to monitor on telemetry.  8. Appreciate excellent care by Medicine and Surgery.       Thank you  ROXI Salamanca, MS, [facility]  Vascular Cardiology Service    Please call with any questions:   DIRECT SERVICE NUMBER: [phone] / Available on TEAMS

## 2024-12-06 NOTE — PROGRESS NOTE ADULT - PROBLEM SELECTOR PLAN 2
#RLE cellulitis with wound vac  s/p unasyn 12/4    - PT following for wound care  - consider wound culture  - continue antibiotics        -vancomycin 12/4-12/11        -zosyn 12/4-12/11
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
## 2024-12-06 NOTE — PHARMACOTHERAPY INTERVENTION NOTE - COMMENTS
Performed medication reconciliation and home medication list updated in prescription writer/outpatient medication review. Medications verified with patient and pharmacy.     Home Pharmacy: [facility] [address]  Allergies: None, confirmed    Home Medications:  amLODIPine 5 mg oral tablet: 1 tab(s) orally once a day  lisinopril 20 mg oral tablet: 1 tab(s) orally once a day  montelukast 10 mg oral tablet: 1 tab(s) orally once a day  valsartan 160 mg oral tablet: 1 tab(s) orally once a day    MEDICATIONS  (STANDING):  heparin  Infusion 1300 Unit(s)/Hr (13 mL/Hr) IV Continuous <Continuous>  piperacillin/tazobactam IVPB.. 3.375 Gram(s) IV Intermittent every 8 hours  polyethylene glycol 3350 17 Gram(s) Oral daily  senna 2 Tablet(s) Oral at bedtime  vancomycin  IVPB 1250 milliGRAM(s) IV Intermittent every 12 hours  ------------------------------------------------------------------------------------------------------------     Recommendations:   Patient is missing all his home medications, add them to inpatient med. His BP has been elevated - 150/88, 142/79, 137/86.  Add amlodipine 5 mg once a day and valsartan 160mg once a day  Add montelukast 10 mg orally at bedtime.    Ace Sanches) Hitesh - PharmD, BCPS  Transitions of Care Pharmacist  Available on [employer] Teams (Preferred)  Spectra: [phone]  Performed medication reconciliation and home medication list updated in prescription writer/outpatient medication review. Medications verified with pharmacy.     Home Pharmacy: [facility] [address]  Allergies: None, confirmed    Home Medications:  amLODIPine 5 mg oral tablet: 1 tab(s) orally once a day  lisinopril 20 mg oral tablet: 1 tab(s) orally once a day  montelukast 10 mg oral tablet: 1 tab(s) orally once a day  valsartan 160 mg oral tablet: 1 tab(s) orally once a day    MEDICATIONS  (STANDING):  heparin  Infusion 1300 Unit(s)/Hr (13 mL/Hr) IV Continuous <Continuous>  piperacillin/tazobactam IVPB.. 3.375 Gram(s) IV Intermittent every 8 hours  polyethylene glycol 3350 17 Gram(s) Oral daily  senna 2 Tablet(s) Oral at bedtime  vancomycin  IVPB 1250 milliGRAM(s) IV Intermittent every 12 hours  ------------------------------------------------------------------------------------------------------------     Recommendations:   Patient is missing all his home medications, add them to inpatient med. His BP has been elevated - 150/88, 142/79, 137/86.  Add amlodipine 5 mg once a day and valsartan 160mg once a day  Add montelukast 10 mg orally at bedtime.    Ace Sanches) Hitehs - PharmD, BCPS  Transitions of Care Pharmacist  Available on Microsoft Teams (Preferred)  Spectra: [phone]

## 2024-12-06 NOTE — PROGRESS NOTE ADULT - ATTENDING COMMENTS
seen and examined 12-06-24 @ 1108    on heparin infusion for bilateral massive pulmonary emboli    small VAC dressing just superior to the right medial malleolus is functioning well  no surrounding erythema to suggest infection    WBC = 15    11/27/2024 @ [facility] - debridement of skin and subcutaneous tissue from right leg wound  -the risk of split-thickness skin graft for this small wound outweigh the benefits for this patient with extensive medical comorbidities  -continue wound VAC by physical therapy team  -reconsult acute care surgery PRN
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
## 2024-12-06 NOTE — PROGRESS NOTE ADULT - SUBJECTIVE AND OBJECTIVE BOX
TEAM [ ACS ] Surgery Daily Progress Note  =====================================================  SUBJECTIVE: Patient seen and examined at bedside on AM rounds. Extubated, off of pressors. Vac doing well.     PMH:   PAST MEDICAL & SURGICAL HISTORY:  Hypertension    H/O cervical fracture    History of pelvic fracture    ALLERGIES:  No Known Allergies  --------------------------------------------------------------------------------------  MEDICATIONS:    Neurologic Medications    Respiratory Medications    Cardiovascular Medications    Gastrointestinal Medications  pantoprazole  Injectable 40 milliGRAM(s) IV Push daily  polyethylene glycol 3350 17 Gram(s) Oral daily  senna 2 Tablet(s) Oral at bedtime  sodium chloride 0.9% lock flush 10 milliLiter(s) IV Push every 1 hour PRN Pre/post blood products, medications, blood draw, and to maintain line patency    Genitourinary Medications    Hematologic/Oncologic Medications  heparin  Infusion 1300 Unit(s)/Hr IV Continuous <Continuous>    Antimicrobial/Immunologic Medications  piperacillin/tazobactam IVPB.. 3.375 Gram(s) IV Intermittent every 8 hours  vancomycin  IVPB 1250 milliGRAM(s) IV Intermittent every 12 hours    Endocrine/Metabolic Medications    Topical/Other Medications  chlorhexidine 2% Cloths 1 Application(s) Topical <User Schedule>  chlorhexidine 4% Liquid 1 Application(s) Topical <User Schedule>  --------------------------------------------------------------------------------------  VITAL SIGNS:    T(C): 37.3 (12-06-24 @ 07:00), Max: 37.6 (12-05-24 @ 19:00)  HR: 93 (12-06-24 @ 10:00) (77 - 103)  BP: 142/79 (12-06-24 @ 10:00) (137/86 - 142/79)  RR: 28 (12-06-24 @ 10:00) (15 - 30)  SpO2: 94% (12-06-24 @ 10:00) (93% - 100%)  --------------------------------------------------------------------------------------  EXAM    General: NAD, A&Ox3  Pulmonary: Saturating well extubated  Cardiovascular: VSS, NSR  Extremities: Bilateral lower extremities warm, RLE medial calf wound vac in place, holding adequate suction, no cellulitic changes or drainage. Mild erythema around vac site.   --------------------------------------------------------------------------------------  LABS                        8.7    15.15 )-----------( 158      ( 06 Dec 2024 00:51 )             27.1   12-06    133[L]  |  103  |  12  ----------------------------<  100[H]  3.5   |  18[L]  |  0.68    Ca    7.9[L]      06 Dec 2024 00:51  Phos  3.4     12-06  Mg     1.8     12-06    TPro  5.2[L]  /  Alb  2.3[L]  /  TBili  0.8  /  DBili  x   /  AST  26  /  ALT  36  /  AlkPhos  106  12-06  --------------------------------------------------------------------------------------  INS AND OUTS:    12-05-24 @ 07:01  -  12-06-24 @ 07:00  --------------------------------------------------------  IN: 1899.2 mL / OUT: 1490 mL / NET: 409.2 mL    12-06-24 @ 07:01  -  12-06-24 @ 10:56  --------------------------------------------------------  IN: 439 mL / OUT: 800 mL / NET: -361 mL  --------------------------------------------------------------------------------------

## 2024-12-06 NOTE — PROGRESS NOTE ADULT - SUBJECTIVE AND OBJECTIVE BOX
Tayler Chahal MD  EM/IM PGY-1  TEAMS  -----------------------------------------    INTERVAL HPI/OVERNIGHT EVENTS: Patient downgraded from CCU.     SUBJECTIVE: Patient seen and examined at bedside. He notes pain that has not been addressed since he has been in this hospital with pain in his left hip, back and right shoulder. Son notes pain in patient's hip was much improved at the last hospital with a lidocaine patch. Patient also notes some soreness in his throat after being extubated and son notes patient has not had any food yet since extubation.    VITAL SIGNS:  T(C): 37.1 (12-06-24 @ 16:28), Max: 37.6 (12-05-24 @ 19:00)  HR: 97 (12-06-24 @ 16:28) (83 - 99)  BP: 145/79 (12-06-24 @ 16:28) (137/86 - 150/88)  RR: 18 (12-06-24 @ 16:28) (15 - 30)  SpO2: 92% (12-06-24 @ 16:28) (92% - 100%)    PHYSICAL EXAM:  General: WN/WD NAD  Head: Atraumatic  Eyes: EOM grossly intact, no scleral icterus  ENT: moist mucous membranes  Neurology: A&Ox3, nonfocal, CABA x 4  Respiratory: normal respiratory effort; CTAB  CV: Extremities warm and well perfused; normal S1/S2, no appreciable murmurs  Abdominal: Soft, mildly distended; nontender  Extremities: wound vac in place to right lower extremity with surrounding erythema    MEDICATIONS:  MEDICATIONS  (STANDING):  acetaminophen     Tablet .. 975 milliGRAM(s) Oral every 8 hours  chlorhexidine 2% Cloths 1 Application(s) Topical <User Schedule>  chlorhexidine 4% Liquid 1 Application(s) Topical <User Schedule>  heparin  Infusion 1300 Unit(s)/Hr (13 mL/Hr) IV Continuous <Continuous>  lidocaine   4% Patch 1 Patch Transdermal every 24 hours  lidocaine 2% Viscous 15 milliLiter(s) Swish and Spit once  piperacillin/tazobactam IVPB.. 3.375 Gram(s) IV Intermittent every 8 hours  polyethylene glycol 3350 17 Gram(s) Oral daily  senna 2 Tablet(s) Oral at bedtime  vancomycin  IVPB 1250 milliGRAM(s) IV Intermittent every 12 hours    MEDICATIONS  (PRN):  oxyCODONE    IR 5 milliGRAM(s) Oral every 6 hours PRN Moderate Pain (4 - 6)  oxyCODONE    IR 10 milliGRAM(s) Oral every 6 hours PRN Severe Pain (7 - 10)  simethicone 80 milliGRAM(s) Chew four times a day PRN Upset Stomach      LABS:                        8.7    15.15 )-----------( 158      ( 06 Dec 2024 00:51 )             27.1     12-06    133[L]  |  103  |  12  ----------------------------<  100[H]  3.5   |  18[L]  |  0.68    Ca    7.9[L]      06 Dec 2024 00:51  Phos  3.4     12-06  Mg     1.8     12-06    TPro  5.2[L]  /  Alb  2.3[L]  /  TBili  0.8  /  DBili  x   /  AST  26  /  ALT  36  /  AlkPhos  106  12-06    PT/INR - ( 06 Dec 2024 00:51 )   PT: 15.3 sec;   INR: 1.33 ratio         PTT - ( 06 Dec 2024 00:51 )  PTT:63.7 sec  Urinalysis Basic - ( 06 Dec 2024 00:51 )    Color: x / Appearance: x / SG: x / pH: x  Gluc: 100 mg/dL / Ketone: x  / Bili: x / Urobili: x   Blood: x / Protein: x / Nitrite: x   Leuk Esterase: x / RBC: x / WBC x   Sq Epi: x / Non Sq Epi: x / Bacteria: x
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
## 2024-12-07 LAB
ANION GAP SERPL CALC-SCNC: 12 MMOL/L — SIGNIFICANT CHANGE UP (ref 5–17)
ANION GAP SERPL CALC-SCNC: 13 MMOL/L — SIGNIFICANT CHANGE UP (ref 5–17)
APTT BLD: 34.9 SEC — SIGNIFICANT CHANGE UP (ref 24.5–35.6)
APTT BLD: 45.3 SEC — HIGH (ref 24.5–35.6)
B2 GLYCOPROT1 IGA SER QL: <2 U/ML — SIGNIFICANT CHANGE UP
B2 GLYCOPROT1 IGG SER-ACNC: <1.4 U/ML — SIGNIFICANT CHANGE UP
B2 GLYCOPROT1 IGM SER-ACNC: <1.5 U/ML — SIGNIFICANT CHANGE UP
BLD GP AB SCN SERPL QL: NEGATIVE — SIGNIFICANT CHANGE UP
BUN SERPL-MCNC: 6 MG/DL — LOW (ref 7–23)
BUN SERPL-MCNC: 8 MG/DL — SIGNIFICANT CHANGE UP (ref 7–23)
CALCIUM SERPL-MCNC: 6.7 MG/DL — LOW (ref 8.4–10.5)
CALCIUM SERPL-MCNC: 8.4 MG/DL — SIGNIFICANT CHANGE UP (ref 8.4–10.5)
CARDIOLIPIN IGM SER-MCNC: <1.5 MPL U/ML — SIGNIFICANT CHANGE UP
CARDIOLIPIN IGM SER-MCNC: <1.6 GPL U/ML — SIGNIFICANT CHANGE UP
CHLORIDE SERPL-SCNC: 102 MMOL/L — SIGNIFICANT CHANGE UP (ref 96–108)
CHLORIDE SERPL-SCNC: 86 MMOL/L — LOW (ref 96–108)
CO2 SERPL-SCNC: 18 MMOL/L — LOW (ref 22–31)
CO2 SERPL-SCNC: 21 MMOL/L — LOW (ref 22–31)
CREAT SERPL-MCNC: 0.55 MG/DL — SIGNIFICANT CHANGE UP (ref 0.5–1.3)
CREAT SERPL-MCNC: 0.64 MG/DL — SIGNIFICANT CHANGE UP (ref 0.5–1.3)
DEPRECATED CARDIOLIPIN IGA SER: <2 APL U/ML — SIGNIFICANT CHANGE UP
DSDNA AB SER-ACNC: <1 IU/ML — SIGNIFICANT CHANGE UP
EGFR: 103 ML/MIN/1.73M2 — SIGNIFICANT CHANGE UP
EGFR: 108 ML/MIN/1.73M2 — SIGNIFICANT CHANGE UP
GLUCOSE SERPL-MCNC: 115 MG/DL — HIGH (ref 70–99)
GLUCOSE SERPL-MCNC: 708 MG/DL — CRITICAL HIGH (ref 70–99)
HCT VFR BLD CALC: 25.9 % — LOW (ref 39–50)
HCT VFR BLD CALC: 29.1 % — LOW (ref 39–50)
HGB BLD-MCNC: 8.1 G/DL — LOW (ref 13–17)
HGB BLD-MCNC: 9.4 G/DL — LOW (ref 13–17)
INR BLD: 1.33 RATIO — HIGH (ref 0.85–1.16)
MAGNESIUM SERPL-MCNC: 1.5 MG/DL — LOW (ref 1.6–2.6)
MCHC RBC-ENTMCNC: 28.8 PG — SIGNIFICANT CHANGE UP (ref 27–34)
MCHC RBC-ENTMCNC: 28.8 PG — SIGNIFICANT CHANGE UP (ref 27–34)
MCHC RBC-ENTMCNC: 31.3 G/DL — LOW (ref 32–36)
MCHC RBC-ENTMCNC: 32.3 G/DL — SIGNIFICANT CHANGE UP (ref 32–36)
MCV RBC AUTO: 89.3 FL — SIGNIFICANT CHANGE UP (ref 80–100)
MCV RBC AUTO: 92.2 FL — SIGNIFICANT CHANGE UP (ref 80–100)
NRBC # BLD: 0 /100 WBCS — SIGNIFICANT CHANGE UP (ref 0–0)
NRBC # BLD: 0 /100 WBCS — SIGNIFICANT CHANGE UP (ref 0–0)
PHOSPHATE SERPL-MCNC: 2.8 MG/DL — SIGNIFICANT CHANGE UP (ref 2.5–4.5)
PLATELET # BLD AUTO: 145 K/UL — LOW (ref 150–400)
PLATELET # BLD AUTO: 169 K/UL — SIGNIFICANT CHANGE UP (ref 150–400)
POTASSIUM SERPL-MCNC: 2.7 MMOL/L — CRITICAL LOW (ref 3.5–5.3)
POTASSIUM SERPL-MCNC: 3 MMOL/L — LOW (ref 3.5–5.3)
POTASSIUM SERPL-SCNC: 2.7 MMOL/L — CRITICAL LOW (ref 3.5–5.3)
POTASSIUM SERPL-SCNC: 3 MMOL/L — LOW (ref 3.5–5.3)
PROTHROM AB SERPL-ACNC: 15.1 SEC — HIGH (ref 9.9–13.4)
RBC # BLD: 2.81 M/UL — LOW (ref 4.2–5.8)
RBC # BLD: 3.26 M/UL — LOW (ref 4.2–5.8)
RBC # FLD: 14 % — SIGNIFICANT CHANGE UP (ref 10.3–14.5)
RBC # FLD: 14 % — SIGNIFICANT CHANGE UP (ref 10.3–14.5)
RH IG SCN BLD-IMP: POSITIVE — SIGNIFICANT CHANGE UP
SODIUM SERPL-SCNC: 116 MMOL/L — CRITICAL LOW (ref 135–145)
SODIUM SERPL-SCNC: 136 MMOL/L — SIGNIFICANT CHANGE UP (ref 135–145)
VANCOMYCIN TROUGH SERPL-MCNC: 9.8 UG/ML — LOW (ref 10–20)
WBC # BLD: 10.21 K/UL — SIGNIFICANT CHANGE UP (ref 3.8–10.5)
WBC # BLD: 10.66 K/UL — HIGH (ref 3.8–10.5)
WBC # FLD AUTO: 10.21 K/UL — SIGNIFICANT CHANGE UP (ref 3.8–10.5)
WBC # FLD AUTO: 10.66 K/UL — HIGH (ref 3.8–10.5)

## 2024-12-07 PROCEDURE — G0452: CPT | Mod: 26

## 2024-12-07 PROCEDURE — 99233 SBSQ HOSP IP/OBS HIGH 50: CPT

## 2024-12-07 RX ORDER — POTASSIUM CHLORIDE 600 MG/1
10 TABLET, EXTENDED RELEASE ORAL
Refills: 0 | Status: COMPLETED | OUTPATIENT
Start: 2024-12-07 | End: 2024-12-07

## 2024-12-07 RX ORDER — HEPARIN SODIUM,PORCINE 1000/ML
6000 VIAL (ML) INJECTION EVERY 6 HOURS
Refills: 0 | Status: DISCONTINUED | OUTPATIENT
Start: 2024-12-07 | End: 2024-12-07

## 2024-12-07 RX ORDER — HEPARIN SODIUM,PORCINE 1000/ML
VIAL (ML) INJECTION
Qty: 25000 | Refills: 0 | Status: DISCONTINUED | OUTPATIENT
Start: 2024-12-07 | End: 2024-12-09

## 2024-12-07 RX ORDER — HEPARIN SODIUM,PORCINE 1000/ML
3000 VIAL (ML) INJECTION EVERY 6 HOURS
Refills: 0 | Status: DISCONTINUED | OUTPATIENT
Start: 2024-12-07 | End: 2024-12-07

## 2024-12-07 RX ORDER — FAMOTIDINE 20 MG/1
20 TABLET, FILM COATED ORAL DAILY
Refills: 0 | Status: DISCONTINUED | OUTPATIENT
Start: 2024-12-07 | End: 2024-12-13

## 2024-12-07 RX ORDER — PHENAZOPYRIDINE HCL 200 MG
100 TABLET ORAL EVERY 8 HOURS
Refills: 0 | Status: COMPLETED | OUTPATIENT
Start: 2024-12-07 | End: 2024-12-09

## 2024-12-07 RX ADMIN — POTASSIUM CHLORIDE 100 MILLIEQUIVALENT(S): 600 TABLET, EXTENDED RELEASE ORAL at 14:42

## 2024-12-07 RX ADMIN — Medication 166.67 MILLIGRAM(S): at 17:53

## 2024-12-07 RX ADMIN — Medication 1600 UNIT(S)/HR: at 18:17

## 2024-12-07 RX ADMIN — Medication 1300 UNIT(S)/HR: at 12:16

## 2024-12-07 RX ADMIN — ACETAMINOPHEN, DIPHENHYDRAMINE HCL, PHENYLEPHRINE HCL 1 MILLIGRAM(S): 325; 25; 5 TABLET ORAL at 21:43

## 2024-12-07 RX ADMIN — ACETAMINOPHEN 500MG 975 MILLIGRAM(S): 500 TABLET, COATED ORAL at 13:25

## 2024-12-07 RX ADMIN — ACETAMINOPHEN 500MG 975 MILLIGRAM(S): 500 TABLET, COATED ORAL at 14:14

## 2024-12-07 RX ADMIN — Medication 100 MILLIGRAM(S): at 21:44

## 2024-12-07 RX ADMIN — CHLORHEXIDINE GLUCONATE 1 APPLICATION(S): 1.2 RINSE ORAL at 06:49

## 2024-12-07 RX ADMIN — Medication 166.67 MILLIGRAM(S): at 05:08

## 2024-12-07 RX ADMIN — OXYCODONE HYDROCHLORIDE 10 MILLIGRAM(S): 30 TABLET ORAL at 01:36

## 2024-12-07 RX ADMIN — Medication 80 MILLIGRAM(S): at 09:07

## 2024-12-07 RX ADMIN — POTASSIUM CHLORIDE 100 MILLIEQUIVALENT(S): 600 TABLET, EXTENDED RELEASE ORAL at 15:53

## 2024-12-07 RX ADMIN — Medication 1600 UNIT(S)/HR: at 19:41

## 2024-12-07 RX ADMIN — Medication 80 MILLIGRAM(S): at 19:43

## 2024-12-07 RX ADMIN — PIPERACILLIN SODIUM AND TAZOBACTAM SODIUM 25 GRAM(S): 4; .5 INJECTION, POWDER, LYOPHILIZED, FOR SOLUTION INTRAVENOUS at 13:30

## 2024-12-07 RX ADMIN — Medication 80 MILLIGRAM(S): at 15:21

## 2024-12-07 RX ADMIN — PIPERACILLIN SODIUM AND TAZOBACTAM SODIUM 25 GRAM(S): 4; .5 INJECTION, POWDER, LYOPHILIZED, FOR SOLUTION INTRAVENOUS at 06:53

## 2024-12-07 RX ADMIN — PIPERACILLIN SODIUM AND TAZOBACTAM SODIUM 25 GRAM(S): 4; .5 INJECTION, POWDER, LYOPHILIZED, FOR SOLUTION INTRAVENOUS at 21:42

## 2024-12-07 RX ADMIN — OXYCODONE HYDROCHLORIDE 10 MILLIGRAM(S): 30 TABLET ORAL at 00:36

## 2024-12-07 RX ADMIN — POTASSIUM CHLORIDE 100 MILLIEQUIVALENT(S): 600 TABLET, EXTENDED RELEASE ORAL at 12:49

## 2024-12-07 RX ADMIN — Medication 2 TABLET(S): at 21:45
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
## 2024-12-07 NOTE — DISCHARGE NOTE PROVIDER - NSDCCPCAREPLAN_GEN_ALL_CORE_FT
PRINCIPAL DISCHARGE DIAGNOSIS  Diagnosis: Pulmonary embolism  Assessment and Plan of Treatment: You were transferred to [facility] for blood clots in your lung. You were seen in the cardiac ICU initially where you improved. You were started on a blood thinner to treat your blood clots. Please continue to take your blood thinner medication.     PRINCIPAL DISCHARGE DIAGNOSIS  Diagnosis: Pulmonary embolism  Assessment and Plan of Treatment: You were transferred to [facility] for blood clots in your lung. You were seen in the cardiac ICU initially where you improved. You were started on a blood thinner to treat your blood clots. Because your blood pressure remained stable and your were taken off of oxygen, you were transitioned from the IV blood thinner medication to an oral blood thinner which you tolerated. On discharge, you will need to continue this medication until you are able to follow up with your doctors to see if this can be discontinued after several months. You will also need to have a repeat CT scan to assess for blood clots in your venous system. Please make sure to follow up with your doctors to have this performed.      SECONDARY DISCHARGE DIAGNOSES  Diagnosis: Cellulitis  Assessment and Plan of Treatment: During your admission to the hospital, you were noted to have swelling in the skin tissue around your right shin. You were treated with IV antibiotics for this and you had a wound vacuum to evacuate the space. On discharge, ---     PRINCIPAL DISCHARGE DIAGNOSIS  Diagnosis: Pulmonary embolism  Assessment and Plan of Treatment: You were transferred to [facility] for blood clots in your lung. You were seen in the cardiac ICU initially where you improved. You were started on a blood thinner to treat your blood clots. Because your blood pressure remained stable and your were taken off of oxygen, you were transitioned from the IV blood thinner medication to an oral blood thinner which you tolerated. On discharge, you will need to continue this medication until you are able to follow up with your doctors to see if this can be discontinued after several months. You will also need to have a repeat CT scan to assess for blood clots in your venous system. Please make sure to follow up with your doctors to have this performed.      SECONDARY DISCHARGE DIAGNOSES  Diagnosis: Cellulitis  Assessment and Plan of Treatment: During your admission to the hospital, you were noted to have swelling in the skin tissue around your right shin. You were treated with IV antibiotics for this and you had a wound vacuum to evacuate the space. On discharge, wound vacuum remained in place. You need to follow up with a plastic surgeon about possibly getting a skin graft over the place you had the wound.

## 2024-12-07 NOTE — PROGRESS NOTE ADULT - PROBLEM SELECTOR PLAN 1
#Acute RVF in setting of massive Pulmonary Embolism  #LLE DVT  CTAP on 11/25 demonstrated deep vein thrombosis involving the left external iliac, common femoral   and superficial femoral veins as well as partially visualized lobar and segmental pulmonary thromboembolism  CTAPE on 12/4 showed Intraluminal filling defects in right main, right interlobar artery, and   lobar/segmental branches of pulmonary artery in all 5 lobes in keeping with pulmonary embolism. Consolidative opacities in the bilateral lower lobes, may represent atelectasis, pulmonary infarct, or a combination of both.    - hypercoagulable workup due to PE occurrence while on therapeutic lovenox  - continue heparin gtt  - continue to follow vascular cardiology recommendations #Acute RVF in setting of massive Pulmonary Embolism  #LLE DVT  CTAP on 11/25 demonstrated deep vein thrombosis involving the left external iliac, common femoral   and superficial femoral veins as well as partially visualized lobar and segmental pulmonary thromboembolism  CTAPE on 12/4 showed Intraluminal filling defects in right main, right interlobar artery, and   lobar/segmental branches of pulmonary artery in all 5 lobes in keeping with pulmonary embolism. Consolidative opacities in the bilateral lower lobes, may represent atelectasis, pulmonary infarct, or a combination of both.    - f/u hypercoagulable workup due to PE occurrence while on therapeutic lovenox  - continue heparin gtt  - continue to follow vascular cardiology recommendations

## 2024-12-07 NOTE — DISCHARGE NOTE PROVIDER - NSDCFUADDAPPT_GEN_ALL_CORE_FT
APPTS ARE READY TO BE MADE: [ ] YES    Best Family or Patient Contact (if needed):    Additional Information about above appointments (if needed):    1:   2:   3:     Other comments or requests:    APPTS ARE READY TO BE MADE: [x] YES    Best Family or Patient Contact (if needed):    Additional Information about above appointments (if needed):    1: Dr. Roxie King   2: [facility]  3:     Other comments or requests:    APPTS ARE READY TO BE MADE: [x] YES    Best Family or Patient Contact (if needed):    Additional Information about above appointments (if needed):    1: Dr. Roxie Diamond  2: [facility]  3:     Other comments or requests:    APPTS ARE READY TO BE MADE: [] YES    Best Family or Patient Contact (if needed):    Additional Information about above appointments (if needed):    1: Dr. Roxie Diamond  2: [facility]  3:     Other comments or requests:

## 2024-12-07 NOTE — PROVIDER CONTACT NOTE (MEDICATION) - REASON
aPtt 45.3 12/7 chart review, patient evaluation, documentation, coordination of care and discussion with nurses, consultants, ACP.

## 2024-12-07 NOTE — DISCHARGE NOTE PROVIDER - CARE PROVIDER_API CALL
Roxie Diamond [facility]  Interventional Cardiology  [address] [place] [id number]  Phone: [phone]  Fax: [phone]  Established Patient  Follow Up Time:
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
## 2024-12-07 NOTE — DISCHARGE NOTE PROVIDER - NSDCCPTREATMENT_GEN_ALL_CORE_FT
PRINCIPAL PROCEDURE  Procedure: CT chest for pulmonary embolism  Findings and Treatment: PULMONARY ANGIOGRAM: There are intraluminal filling defects in right   main, right interlobar artery, and lobar/segmental branches of pulmonary   artery in all 5 lobes.  HEART/VASCULATURE: There is CT evidence of right heart strain with an   RV:LV ratio of approximately 1.25. Coronary artery calcifications. Right   IJ approach L[place]-Charlie catheter with tip in the right pulmonary artery   just proximal to the emboli.  LUNGS/AIRWAYS/PLEURA: Endotracheal tube with tip above the venecia.   Evaluation of the lung parenchyma is limited due to respiratory motion   artifact. Bilateral lower lobes have low volume with areas of confluent   groundglass opacity/consolidation which may represent atelectasis or   hemorrhage/infarct likely combination of both.  LYMPH NODES/MEDIASTINUM: No thoracic adenopathy.  UPPER ABDOMEN: Enteric tube descends below level of diaphragm, into the   stomach and inferiorly out of the field-of-view.  BONES/SOFT TISSUES: Degenerative changes.  IMPRESSION:  Intraluminal filling defects in right main, right interlobar artery, and   lobar/segmental branches of pulmonary artery in all 5 lobes in keeping   with pulmonary embolism. Consolidative opacities in the bilateral lower   lobes, may represent atelectasis, pulmonary infarct, or a combination of   both.  There is CT evidence of right heart strain with an RV:LV ratio of   approximately 1.25.      SECONDARY PROCEDURE  Procedure: Complete transthoracic echocardiography (TTE)  Findings and Treatment: Follow up echo to specifically assess RV size/function post tPA.      1. Technically difficult image quality.   2. Moderately enlarged right ventricular cavity size and severely reduced right ventricular systolic function.   3. Right ventricular systolic function is reduced with apical sparing--similar to prior study.   4. Moderate tricuspid regurgitation.   5. Estimated pulmonary artery systolic pressure is 32 mmHg.   6. The inferior vena cava is dilated measuring 2.56 cm in diameter, (dilated >2.1cm) with abnormal inspiratory collapse (abnormal <50%) consistent with elevated right atrial pressure (~15, range 10-20mmHg).   7. Left ventricle was not well visualized.   8. Compared to the transthoracic echocardiogram performed on 12/3/2024, PA pressure has decreased. RV size is slightly smaller but systolic function remains severely depressed.

## 2024-12-07 NOTE — PROVIDER CONTACT NOTE (CRITICAL VALUE NOTIFICATION) - ASSESSMENT
Patient A&Ox2, VSS. Patient Bel[place]ian speaking with son at bedside. Bruising on right groin noted with swelling.

## 2024-12-07 NOTE — PROGRESS NOTE ADULT - SUBJECTIVE AND OBJECTIVE BOX
Tayler Chahal MD  EM/IM PGY-1  TEAMS  -----------------------------------------    ================== UNFINISHED NOTE =======================    INTERVAL HPI/OVERNIGHT EVENTS:    SUBJECTIVE: Patient seen and examined at bedside.     VITAL SIGNS:  T(C): 36.7 (12-07-24 @ 08:00), Max: 37.3 (12-06-24 @ 20:47)  HR: 112 (12-07-24 @ 08:00) (93 - 112)  BP: 128/82 (12-07-24 @ 08:00) (128/80 - 150/88)  RR: 18 (12-07-24 @ 08:00) (18 - 28)  SpO2: 91% (12-07-24 @ 08:00) (90% - 98%)    PHYSICAL EXAM:        MEDICATIONS:  MEDICATIONS  (STANDING):  acetaminophen     Tablet .. 975 milliGRAM(s) Oral every 8 hours  chlorhexidine 2% Cloths 1 Application(s) Topical <User Schedule>  chlorhexidine 4% Liquid 1 Application(s) Topical <User Schedule>  heparin  Infusion 1300 Unit(s)/Hr (13 mL/Hr) IV Continuous <Continuous>  lidocaine   4% Patch 1 Patch Transdermal every 24 hours  melatonin 1 milliGRAM(s) Oral at bedtime  piperacillin/tazobactam IVPB.. 3.375 Gram(s) IV Intermittent every 8 hours  polyethylene glycol 3350 17 Gram(s) Oral daily  senna 2 Tablet(s) Oral at bedtime  vancomycin  IVPB 1250 milliGRAM(s) IV Intermittent every 12 hours    MEDICATIONS  (PRN):  oxyCODONE    IR 5 milliGRAM(s) Oral every 6 hours PRN Moderate Pain (4 - 6)  oxyCODONE    IR 10 milliGRAM(s) Oral every 6 hours PRN Severe Pain (7 - 10)  simethicone 80 milliGRAM(s) Chew four times a day PRN Upset Stomach      LABS:                        8.1    10.21 )-----------( 145      ( 07 Dec 2024 06:04 )             25.9     12-07    136  |  102  |  8   ----------------------------<  115[H]  3.0[L]   |  21[L]  |  0.64    Ca    8.4      07 Dec 2024 07:10  Phos  2.8     12-07  Mg     1.5     12-07    TPro  5.2[L]  /  Alb  2.3[L]  /  TBili  0.8  /  DBili  x   /  AST  26  /  ALT  36  /  AlkPhos  106  12-06    PT/INR - ( 07 Dec 2024 05:57 )   PT: 15.1 sec;   INR: 1.33 ratio         PTT - ( 06 Dec 2024 00:51 )  PTT:63.7 sec  Urinalysis Basic - ( 07 Dec 2024 07:10 )    Color: x / Appearance: x / SG: x / pH: x  Gluc: 115 mg/dL / Ketone: x  / Bili: x / Urobili: x   Blood: x / Protein: x / Nitrite: x   Leuk Esterase: x / RBC: x / WBC x   Sq Epi: x / Non Sq Epi: x / Bacteria: x         Tayler Chahal MD  EM/IM PGY-1  TEAMS  -----------------------------------------    INTERVAL HPI/OVERNIGHT EVENTS: Johansen removed overnight. Patient voiding, some retention with post void residuals overnight of 170, 317, 287mL    SUBJECTIVE: Patient seen and examined at bedside.     VITAL SIGNS:  T(C): 36.7 (12-07-24 @ 08:00), Max: 37.3 (12-06-24 @ 20:47)  HR: 112 (12-07-24 @ 08:00) (93 - 112)  BP: 128/82 (12-07-24 @ 08:00) (128/80 - 150/88)  RR: 18 (12-07-24 @ 08:00) (18 - 28)  SpO2: 91% (12-07-24 @ 08:00) (90% - 98%)    PHYSICAL EXAM:  General: WN/WD NAD  Head: Atraumatic  Eyes: EOM grossly intact, no scleral icterus  ENT: moist mucous membranes  Neurology: A&Ox3, nonfocal, CABA x 4  Respiratory: normal respiratory effort; CTAB  CV: Extremities warm and well perfused; normal S1/S2, no appreciable murmurs  Abdominal: Soft, mildly distended; nontender  Extremities: wound vac in place to right lower extremity with surrounding erythema    MEDICATIONS:  MEDICATIONS  (STANDING):  acetaminophen     Tablet .. 975 milliGRAM(s) Oral every 8 hours  chlorhexidine 2% Cloths 1 Application(s) Topical <User Schedule>  chlorhexidine 4% Liquid 1 Application(s) Topical <User Schedule>  heparin  Infusion 1300 Unit(s)/Hr (13 mL/Hr) IV Continuous <Continuous>  lidocaine   4% Patch 1 Patch Transdermal every 24 hours  melatonin 1 milliGRAM(s) Oral at bedtime  piperacillin/tazobactam IVPB.. 3.375 Gram(s) IV Intermittent every 8 hours  polyethylene glycol 3350 17 Gram(s) Oral daily  senna 2 Tablet(s) Oral at bedtime  vancomycin  IVPB 1250 milliGRAM(s) IV Intermittent every 12 hours    MEDICATIONS  (PRN):  oxyCODONE    IR 5 milliGRAM(s) Oral every 6 hours PRN Moderate Pain (4 - 6)  oxyCODONE    IR 10 milliGRAM(s) Oral every 6 hours PRN Severe Pain (7 - 10)  simethicone 80 milliGRAM(s) Chew four times a day PRN Upset Stomach      LABS:                        8.1    10.21 )-----------( 145      ( 07 Dec 2024 06:04 )             25.9     12-07    136  |  102  |  8   ----------------------------<  115[H]  3.0[L]   |  21[L]  |  0.64    Ca    8.4      07 Dec 2024 07:10  Phos  2.8     12-07  Mg     1.5     12-07    TPro  5.2[L]  /  Alb  2.3[L]  /  TBili  0.8  /  DBili  x   /  AST  26  /  ALT  36  /  AlkPhos  106  12-06    PT/INR - ( 07 Dec 2024 05:57 )   PT: 15.1 sec;   INR: 1.33 ratio         PTT - ( 06 Dec 2024 00:51 )  PTT:63.7 sec  Urinalysis Basic - ( 07 Dec 2024 07:10 )    Color: x / Appearance: x / SG: x / pH: x  Gluc: 115 mg/dL / Ketone: x  / Bili: x / Urobili: x   Blood: x / Protein: x / Nitrite: x   Leuk Esterase: x / RBC: x / WBC x   Sq Epi: x / Non Sq Epi: x / Bacteria: x         Tayler Chahal MD  EM/IM PGY-1  TEAMS  -----------------------------------------    INTERVAL HPI/OVERNIGHT EVENTS: Johansen removed overnight. Patient voiding, some retention with post void residuals overnight of 170, 317, 287mL    SUBJECTIVE: Patient seen and examined at bedside. He notes his abdominal pain is improving after simethicone. He continues to note some pain in his throat and does not want to eat solid food yet.      VITAL SIGNS:  T(C): 36.7 (12-07-24 @ 08:00), Max: 37.3 (12-06-24 @ 20:47)  HR: 112 (12-07-24 @ 08:00) (93 - 112)  BP: 128/82 (12-07-24 @ 08:00) (128/80 - 150/88)  RR: 18 (12-07-24 @ 08:00) (18 - 28)  SpO2: 91% (12-07-24 @ 08:00) (90% - 98%)    PHYSICAL EXAM:  General: WN/WD NAD  Head: Atraumatic  Eyes: EOM grossly intact, no scleral icterus  ENT: moist mucous membranes  Neurology: A&Ox3, nonfocal, CABA x 4  Respiratory: normal respiratory effort; CTAB  CV: Extremities warm and well perfused; normal S1/S2, no appreciable murmurs  Abdominal: Soft, mildly distended; nontender  Extremities: wound vac in place to right lower extremity with surrounding erythema    MEDICATIONS:  MEDICATIONS  (STANDING):  acetaminophen     Tablet .. 975 milliGRAM(s) Oral every 8 hours  chlorhexidine 2% Cloths 1 Application(s) Topical <User Schedule>  chlorhexidine 4% Liquid 1 Application(s) Topical <User Schedule>  heparin  Infusion 1300 Unit(s)/Hr (13 mL/Hr) IV Continuous <Continuous>  lidocaine   4% Patch 1 Patch Transdermal every 24 hours  melatonin 1 milliGRAM(s) Oral at bedtime  piperacillin/tazobactam IVPB.. 3.375 Gram(s) IV Intermittent every 8 hours  polyethylene glycol 3350 17 Gram(s) Oral daily  senna 2 Tablet(s) Oral at bedtime  vancomycin  IVPB 1250 milliGRAM(s) IV Intermittent every 12 hours    MEDICATIONS  (PRN):  oxyCODONE    IR 5 milliGRAM(s) Oral every 6 hours PRN Moderate Pain (4 - 6)  oxyCODONE    IR 10 milliGRAM(s) Oral every 6 hours PRN Severe Pain (7 - 10)  simethicone 80 milliGRAM(s) Chew four times a day PRN Upset Stomach      LABS:                        8.1    10.21 )-----------( 145      ( 07 Dec 2024 06:04 )             25.9     12-07    136  |  102  |  8   ----------------------------<  115[H]  3.0[L]   |  21[L]  |  0.64    Ca    8.4      07 Dec 2024 07:10  Phos  2.8     12-07  Mg     1.5     12-07    TPro  5.2[L]  /  Alb  2.3[L]  /  TBili  0.8  /  DBili  x   /  AST  26  /  ALT  36  /  AlkPhos  106  12-06    PT/INR - ( 07 Dec 2024 05:57 )   PT: 15.1 sec;   INR: 1.33 ratio         PTT - ( 06 Dec 2024 00:51 )  PTT:63.7 sec  Urinalysis Basic - ( 07 Dec 2024 07:10 )    Color: x / Appearance: x / SG: x / pH: x  Gluc: 115 mg/dL / Ketone: x  / Bili: x / Urobili: x   Blood: x / Protein: x / Nitrite: x   Leuk Esterase: x / RBC: x / WBC x   Sq Epi: x / Non Sq Epi: x / Bacteria: x

## 2024-12-07 NOTE — PROVIDER CONTACT NOTE (MEDICATION) - BACKGROUND
hx of LLE DVT, CTA PE b/l lobar PE ext to segmental branches in all 5 lobes w/ b/l basilar consolidations c/f pulmonary infarction
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
## 2024-12-07 NOTE — DISCHARGE NOTE PROVIDER - NSDCMRMEDTOKEN_GEN_ALL_CORE_FT
amLODIPine 5 mg oral tablet: 1 tab(s) orally once a day  lisinopril 20 mg oral tablet: 1 tab(s) orally once a day  montelukast 10 mg oral tablet: 1 tab(s) orally once a day  valsartan 160 mg oral tablet: 1 tab(s) orally once a day   amLODIPine 5 mg oral tablet: 1 tab(s) orally once a day  famotidine 20 mg oral tablet: 1 tab(s) orally once a day  lidocaine 4% topical film: Apply topically to affected area once a day  lisinopril 20 mg oral tablet: 1 tab(s) orally once a day  montelukast 10 mg oral tablet: 1 tab(s) orally once a day  polyethylene glycol 3350 oral powder for reconstitution: 17 gram(s) orally once a day  senna leaf extract oral tablet: 2 tab(s) orally once a day (at bedtime)  simethicone 80 mg oral tablet, chewable: 1 tab(s) orally 4 times a day As needed Upset Stomach  tamsulosin 0.4 mg oral capsule: 1 cap(s) orally once a day (at bedtime)  valsartan 160 mg oral tablet: 1 tab(s) orally once a day   Eliquis 5 mg oral tablet: 1 tab(s) orally every 12 hours Start taking on 12/16/24  Eliquis 5 mg oral tablet: 2 tab(s) orally every 12 hours take until 12/15/24  famotidine 20 mg oral tablet: 1 tab(s) orally once a day  lidocaine 4% topical film: Apply topically to affected area once a day  montelukast 10 mg oral tablet: 1 tab(s) orally once a day  polyethylene glycol 3350 oral powder for reconstitution: 17 gram(s) orally once a day  senna leaf extract oral tablet: 2 tab(s) orally once a day (at bedtime)  simethicone 80 mg oral tablet, chewable: 1 tab(s) orally 4 times a day As needed Upset Stomach  tamsulosin 0.4 mg oral capsule: 1 cap(s) orally once a day (at bedtime)  valsartan 160 mg oral tablet: 1 tab(s) orally once a day   Eliquis 5 mg oral tablet: 1 tab(s) orally every 12 hours Start taking on 12/16/24  Eliquis 5 mg oral tablet: 2 tab(s) orally every 12 hours take until 12/15/24  famotidine 20 mg oral tablet: 1 tab(s) orally once a day  lidocaine 4% topical film: Apply topically to affected area once a day  montelukast 10 mg oral tablet: 1 tab(s) orally once a day  polyethylene glycol 3350 oral powder for reconstitution: 17 gram(s) orally once a day  senna leaf extract oral tablet: 2 tab(s) orally once a day (at bedtime)  simethicone 80 mg oral tablet, chewable: 1 tab(s) orally 4 times a day As needed Upset Stomach  tamsulosin 0.4 mg oral capsule: 1 cap(s) orally once a day (at bedtime)  valsartan 80 mg oral tablet: 1 tab(s) orally once a day   Diovan 80 mg oral tablet: 1 tab(s) orally once a day  Eliquis 5 mg oral tablet: 1 tab(s) orally every 12 hours Start taking on 12/16/24  Eliquis 5 mg oral tablet: 2 tab(s) orally every 12 hours take until 12/15/24  famotidine 20 mg oral tablet: 1 tab(s) orally once a day  lidocaine 4% topical film: Apply topically to affected area once a day  montelukast 10 mg oral tablet: 1 tab(s) orally once a day  polyethylene glycol 3350 oral powder for reconstitution: 17 gram(s) orally once a day  senna leaf extract oral tablet: 2 tab(s) orally once a day (at bedtime)  simethicone 80 mg oral tablet, chewable: 1 tab(s) orally 4 times a day As needed Upset Stomach  tamsulosin 0.4 mg oral capsule: 1 cap(s) orally once a day (at bedtime)

## 2024-12-07 NOTE — PROGRESS NOTE ADULT - ATTENDING COMMENTS
Patient is seen and examined with the resident, Agree with above assessment and plan. Will continue heparin gtt for the PE. Also will continue IV abx as per ID rec for LE cellulitis. Wound vac in place. Rest to the management as per resident note above. Replaced low potassium.

## 2024-12-08 LAB
ANION GAP SERPL CALC-SCNC: 12 MMOL/L — SIGNIFICANT CHANGE UP (ref 5–17)
ANION GAP SERPL CALC-SCNC: 13 MMOL/L — SIGNIFICANT CHANGE UP (ref 5–17)
ANION GAP SERPL CALC-SCNC: 14 MMOL/L — SIGNIFICANT CHANGE UP (ref 5–17)
APTT BLD: 51 SEC — HIGH (ref 24.5–35.6)
APTT BLD: 71.2 SEC — HIGH (ref 24.5–35.6)
APTT BLD: 74.9 SEC — HIGH (ref 24.5–35.6)
BUN SERPL-MCNC: 6 MG/DL — LOW (ref 7–23)
BUN SERPL-MCNC: 6 MG/DL — LOW (ref 7–23)
BUN SERPL-MCNC: 7 MG/DL — SIGNIFICANT CHANGE UP (ref 7–23)
CALCIUM SERPL-MCNC: 8.3 MG/DL — LOW (ref 8.4–10.5)
CALCIUM SERPL-MCNC: 8.4 MG/DL — SIGNIFICANT CHANGE UP (ref 8.4–10.5)
CALCIUM SERPL-MCNC: 8.4 MG/DL — SIGNIFICANT CHANGE UP (ref 8.4–10.5)
CHLORIDE SERPL-SCNC: 101 MMOL/L — SIGNIFICANT CHANGE UP (ref 96–108)
CHLORIDE SERPL-SCNC: 102 MMOL/L — SIGNIFICANT CHANGE UP (ref 96–108)
CHLORIDE SERPL-SCNC: 103 MMOL/L — SIGNIFICANT CHANGE UP (ref 96–108)
CO2 SERPL-SCNC: 21 MMOL/L — LOW (ref 22–31)
CO2 SERPL-SCNC: 21 MMOL/L — LOW (ref 22–31)
CO2 SERPL-SCNC: 23 MMOL/L — SIGNIFICANT CHANGE UP (ref 22–31)
CREAT SERPL-MCNC: 0.63 MG/DL — SIGNIFICANT CHANGE UP (ref 0.5–1.3)
CREAT SERPL-MCNC: 0.64 MG/DL — SIGNIFICANT CHANGE UP (ref 0.5–1.3)
CREAT SERPL-MCNC: 0.67 MG/DL — SIGNIFICANT CHANGE UP (ref 0.5–1.3)
EGFR: 102 ML/MIN/1.73M2 — SIGNIFICANT CHANGE UP
EGFR: 103 ML/MIN/1.73M2 — SIGNIFICANT CHANGE UP
EGFR: 104 ML/MIN/1.73M2 — SIGNIFICANT CHANGE UP
GLUCOSE SERPL-MCNC: 108 MG/DL — HIGH (ref 70–99)
GLUCOSE SERPL-MCNC: 124 MG/DL — HIGH (ref 70–99)
GLUCOSE SERPL-MCNC: 134 MG/DL — HIGH (ref 70–99)
HCT VFR BLD CALC: 29.4 % — LOW (ref 39–50)
HGB BLD-MCNC: 9.4 G/DL — LOW (ref 13–17)
MAGNESIUM SERPL-MCNC: 1.7 MG/DL — SIGNIFICANT CHANGE UP (ref 1.6–2.6)
MCHC RBC-ENTMCNC: 28.6 PG — SIGNIFICANT CHANGE UP (ref 27–34)
MCHC RBC-ENTMCNC: 32 G/DL — SIGNIFICANT CHANGE UP (ref 32–36)
MCV RBC AUTO: 89.4 FL — SIGNIFICANT CHANGE UP (ref 80–100)
NRBC # BLD: 0 /100 WBCS — SIGNIFICANT CHANGE UP (ref 0–0)
PHOSPHATE SERPL-MCNC: 3.3 MG/DL — SIGNIFICANT CHANGE UP (ref 2.5–4.5)
PLATELET # BLD AUTO: 147 K/UL — LOW (ref 150–400)
POTASSIUM SERPL-MCNC: 2.9 MMOL/L — CRITICAL LOW (ref 3.5–5.3)
POTASSIUM SERPL-MCNC: 3.3 MMOL/L — LOW (ref 3.5–5.3)
POTASSIUM SERPL-MCNC: 3.3 MMOL/L — LOW (ref 3.5–5.3)
POTASSIUM SERPL-SCNC: 2.9 MMOL/L — CRITICAL LOW (ref 3.5–5.3)
POTASSIUM SERPL-SCNC: 3.3 MMOL/L — LOW (ref 3.5–5.3)
POTASSIUM SERPL-SCNC: 3.3 MMOL/L — LOW (ref 3.5–5.3)
RBC # BLD: 3.29 M/UL — LOW (ref 4.2–5.8)
RBC # FLD: 14 % — SIGNIFICANT CHANGE UP (ref 10.3–14.5)
SODIUM SERPL-SCNC: 136 MMOL/L — SIGNIFICANT CHANGE UP (ref 135–145)
SODIUM SERPL-SCNC: 136 MMOL/L — SIGNIFICANT CHANGE UP (ref 135–145)
SODIUM SERPL-SCNC: 138 MMOL/L — SIGNIFICANT CHANGE UP (ref 135–145)
VANCOMYCIN TROUGH SERPL-MCNC: 12.3 UG/ML — SIGNIFICANT CHANGE UP (ref 10–20)
WBC # BLD: 9.09 K/UL — SIGNIFICANT CHANGE UP (ref 3.8–10.5)
WBC # FLD AUTO: 9.09 K/UL — SIGNIFICANT CHANGE UP (ref 3.8–10.5)

## 2024-12-08 PROCEDURE — 99233 SBSQ HOSP IP/OBS HIGH 50: CPT

## 2024-12-08 RX ORDER — POTASSIUM CHLORIDE 600 MG/1
10 TABLET, EXTENDED RELEASE ORAL
Refills: 0 | Status: DISCONTINUED | OUTPATIENT
Start: 2024-12-08 | End: 2024-12-08

## 2024-12-08 RX ORDER — POTASSIUM CHLORIDE 600 MG/1
40 TABLET, EXTENDED RELEASE ORAL ONCE
Refills: 0 | Status: COMPLETED | OUTPATIENT
Start: 2024-12-08 | End: 2024-12-08

## 2024-12-08 RX ORDER — POTASSIUM CHLORIDE 600 MG/1
10 TABLET, EXTENDED RELEASE ORAL
Refills: 0 | Status: COMPLETED | OUTPATIENT
Start: 2024-12-08 | End: 2024-12-08

## 2024-12-08 RX ORDER — LIDOCAINE 40 MG/G
1 CREAM TOPICAL ONCE
Refills: 0 | Status: COMPLETED | OUTPATIENT
Start: 2024-12-08 | End: 2024-12-08

## 2024-12-08 RX ADMIN — CHLORHEXIDINE GLUCONATE 1 APPLICATION(S): 1.2 RINSE ORAL at 06:08

## 2024-12-08 RX ADMIN — ACETAMINOPHEN 500MG 975 MILLIGRAM(S): 500 TABLET, COATED ORAL at 03:36

## 2024-12-08 RX ADMIN — PIPERACILLIN SODIUM AND TAZOBACTAM SODIUM 25 GRAM(S): 4; .5 INJECTION, POWDER, LYOPHILIZED, FOR SOLUTION INTRAVENOUS at 13:18

## 2024-12-08 RX ADMIN — LIDOCAINE 1 PATCH: 40 CREAM TOPICAL at 19:17

## 2024-12-08 RX ADMIN — Medication 1800 UNIT(S)/HR: at 14:28

## 2024-12-08 RX ADMIN — ACETAMINOPHEN 500MG 975 MILLIGRAM(S): 500 TABLET, COATED ORAL at 21:08

## 2024-12-08 RX ADMIN — POTASSIUM CHLORIDE 100 MILLIEQUIVALENT(S): 600 TABLET, EXTENDED RELEASE ORAL at 13:31

## 2024-12-08 RX ADMIN — Medication 1800 UNIT(S)/HR: at 02:58

## 2024-12-08 RX ADMIN — POTASSIUM CHLORIDE 100 MILLIEQUIVALENT(S): 600 TABLET, EXTENDED RELEASE ORAL at 08:33

## 2024-12-08 RX ADMIN — Medication 1800 UNIT(S)/HR: at 01:06

## 2024-12-08 RX ADMIN — Medication 100 MILLIGRAM(S): at 13:19

## 2024-12-08 RX ADMIN — CHLORHEXIDINE GLUCONATE 1 APPLICATION(S): 1.2 RINSE ORAL at 06:07

## 2024-12-08 RX ADMIN — ACETAMINOPHEN, DIPHENHYDRAMINE HCL, PHENYLEPHRINE HCL 1 MILLIGRAM(S): 325; 25; 5 TABLET ORAL at 23:11

## 2024-12-08 RX ADMIN — LIDOCAINE 1 PATCH: 40 CREAM TOPICAL at 17:33

## 2024-12-08 RX ADMIN — ACETAMINOPHEN 500MG 975 MILLIGRAM(S): 500 TABLET, COATED ORAL at 22:08

## 2024-12-08 RX ADMIN — LIDOCAINE 1 PATCH: 40 CREAM TOPICAL at 17:11

## 2024-12-08 RX ADMIN — POTASSIUM CHLORIDE 100 MILLIEQUIVALENT(S): 600 TABLET, EXTENDED RELEASE ORAL at 14:28

## 2024-12-08 RX ADMIN — Medication 166.67 MILLIGRAM(S): at 18:34

## 2024-12-08 RX ADMIN — FAMOTIDINE 20 MILLIGRAM(S): 20 TABLET, FILM COATED ORAL at 13:18

## 2024-12-08 RX ADMIN — POTASSIUM CHLORIDE 100 MILLIEQUIVALENT(S): 600 TABLET, EXTENDED RELEASE ORAL at 16:07

## 2024-12-08 RX ADMIN — POTASSIUM CHLORIDE 40 MILLIEQUIVALENT(S): 600 TABLET, EXTENDED RELEASE ORAL at 09:01

## 2024-12-08 RX ADMIN — ACETAMINOPHEN 500MG 975 MILLIGRAM(S): 500 TABLET, COATED ORAL at 04:36

## 2024-12-08 RX ADMIN — POTASSIUM CHLORIDE 100 MILLIEQUIVALENT(S): 600 TABLET, EXTENDED RELEASE ORAL at 12:11

## 2024-12-08 RX ADMIN — PIPERACILLIN SODIUM AND TAZOBACTAM SODIUM 25 GRAM(S): 4; .5 INJECTION, POWDER, LYOPHILIZED, FOR SOLUTION INTRAVENOUS at 21:08

## 2024-12-08 RX ADMIN — PIPERACILLIN SODIUM AND TAZOBACTAM SODIUM 25 GRAM(S): 4; .5 INJECTION, POWDER, LYOPHILIZED, FOR SOLUTION INTRAVENOUS at 06:07

## 2024-12-08 RX ADMIN — Medication 2 TABLET(S): at 21:08

## 2024-12-08 RX ADMIN — POTASSIUM CHLORIDE 100 MILLIEQUIVALENT(S): 600 TABLET, EXTENDED RELEASE ORAL at 10:14

## 2024-12-08 RX ADMIN — Medication 100 MILLIGRAM(S): at 21:09

## 2024-12-08 RX ADMIN — Medication 1800 UNIT(S)/HR: at 08:00

## 2024-12-08 RX ADMIN — Medication 100 MILLIGRAM(S): at 07:15

## 2024-12-08 RX ADMIN — Medication 166.67 MILLIGRAM(S): at 06:05

## 2024-12-08 NOTE — PHYSICAL THERAPY INITIAL EVALUATION ADULT - TRANSFER TRAINING, PT EVAL
GOAL: Patient will perform sit to stand transfers to rolling walker independently with proper hand placement in 4 weeks.
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
## 2024-12-08 NOTE — PHYSICAL THERAPY INITIAL EVALUATION ADULT - PLANNED THERAPY INTERVENTIONS, PT EVAL
GOAL: Pt will negotiate up/down 4 steps with handrail independently in 4 weeks./bed mobility training/gait training/transfer training
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
## 2024-12-08 NOTE — PROGRESS NOTE ADULT - PROBLEM SELECTOR PLAN 1
#Acute RVF in setting of massive Pulmonary Embolism  #LLE DVT  CTAP on 11/25 demonstrated deep vein thrombosis involving the left external iliac, common femoral   and superficial femoral veins as well as partially visualized lobar and segmental pulmonary thromboembolism  CTAPE on 12/4 showed Intraluminal filling defects in right main, right interlobar artery, and   lobar/segmental branches of pulmonary artery in all 5 lobes in keeping with pulmonary embolism. Consolidative opacities in the bilateral lower lobes, may represent atelectasis, pulmonary infarct, or a combination of both.    - f/u hypercoagulable workup due to PE occurrence while on therapeutic lovenox  - continue heparin gtt  - continue to follow vascular cardiology recommendations
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
## 2024-12-09 LAB
ANION GAP SERPL CALC-SCNC: 14 MMOL/L — SIGNIFICANT CHANGE UP (ref 5–17)
APTT BLD: 70.4 SEC — HIGH (ref 24.5–35.6)
BUN SERPL-MCNC: 6 MG/DL — LOW (ref 7–23)
CALCIUM SERPL-MCNC: 8.6 MG/DL — SIGNIFICANT CHANGE UP (ref 8.4–10.5)
CHLORIDE SERPL-SCNC: 106 MMOL/L — SIGNIFICANT CHANGE UP (ref 96–108)
CO2 SERPL-SCNC: 21 MMOL/L — LOW (ref 22–31)
CREAT SERPL-MCNC: 0.73 MG/DL — SIGNIFICANT CHANGE UP (ref 0.5–1.3)
CULTURE RESULTS: SIGNIFICANT CHANGE UP
CULTURE RESULTS: SIGNIFICANT CHANGE UP
DRVVT RATIO: 1.05 RATIO — SIGNIFICANT CHANGE UP (ref 0–1.21)
DRVVT SCREEN TO CONFIRM RATIO: SIGNIFICANT CHANGE UP
EGFR: 99 ML/MIN/1.73M2 — SIGNIFICANT CHANGE UP
GLUCOSE BLDC GLUCOMTR-MCNC: 145 MG/DL — HIGH (ref 70–99)
GLUCOSE SERPL-MCNC: 94 MG/DL — SIGNIFICANT CHANGE UP (ref 70–99)
HCT VFR BLD CALC: 29.7 % — LOW (ref 39–50)
HGB BLD-MCNC: 9.5 G/DL — LOW (ref 13–17)
MAGNESIUM SERPL-MCNC: 2.3 MG/DL — SIGNIFICANT CHANGE UP (ref 1.6–2.6)
MCHC RBC-ENTMCNC: 28.8 PG — SIGNIFICANT CHANGE UP (ref 27–34)
MCHC RBC-ENTMCNC: 32 G/DL — SIGNIFICANT CHANGE UP (ref 32–36)
MCV RBC AUTO: 90 FL — SIGNIFICANT CHANGE UP (ref 80–100)
NORMALIZED SCT PPP-RTO: 0.89 RATIO — SIGNIFICANT CHANGE UP (ref 0–1.16)
NORMALIZED SCT PPP-RTO: SIGNIFICANT CHANGE UP
NRBC # BLD: 0 /100 WBCS — SIGNIFICANT CHANGE UP (ref 0–0)
PHOSPHATE SERPL-MCNC: 3.5 MG/DL — SIGNIFICANT CHANGE UP (ref 2.5–4.5)
PLATELET # BLD AUTO: 143 K/UL — LOW (ref 150–400)
POTASSIUM SERPL-MCNC: 3.6 MMOL/L — SIGNIFICANT CHANGE UP (ref 3.5–5.3)
POTASSIUM SERPL-SCNC: 3.6 MMOL/L — SIGNIFICANT CHANGE UP (ref 3.5–5.3)
RBC # BLD: 3.3 M/UL — LOW (ref 4.2–5.8)
RBC # FLD: 14.2 % — SIGNIFICANT CHANGE UP (ref 10.3–14.5)
SODIUM SERPL-SCNC: 141 MMOL/L — SIGNIFICANT CHANGE UP (ref 135–145)
SPECIMEN SOURCE: SIGNIFICANT CHANGE UP
SPECIMEN SOURCE: SIGNIFICANT CHANGE UP
WBC # BLD: 8.75 K/UL — SIGNIFICANT CHANGE UP (ref 3.8–10.5)
WBC # FLD AUTO: 8.75 K/UL — SIGNIFICANT CHANGE UP (ref 3.8–10.5)

## 2024-12-09 PROCEDURE — 99233 SBSQ HOSP IP/OBS HIGH 50: CPT

## 2024-12-09 PROCEDURE — 99233 SBSQ HOSP IP/OBS HIGH 50: CPT | Mod: GC

## 2024-12-09 PROCEDURE — 99222 1ST HOSP IP/OBS MODERATE 55: CPT

## 2024-12-09 RX ORDER — TAMSULOSIN HYDROCHLORIDE 0.4 MG/1
0.4 CAPSULE ORAL AT BEDTIME
Refills: 0 | Status: DISCONTINUED | OUTPATIENT
Start: 2024-12-09 | End: 2024-12-13

## 2024-12-09 RX ORDER — POTASSIUM CHLORIDE 600 MG/1
10 TABLET, EXTENDED RELEASE ORAL
Refills: 0 | Status: COMPLETED | OUTPATIENT
Start: 2024-12-09 | End: 2024-12-09

## 2024-12-09 RX ORDER — POTASSIUM CHLORIDE 600 MG/1
10 TABLET, EXTENDED RELEASE ORAL
Refills: 0 | Status: DISCONTINUED | OUTPATIENT
Start: 2024-12-09 | End: 2024-12-09

## 2024-12-09 RX ORDER — APIXABAN 2.5 MG/1
5 TABLET, FILM COATED ORAL EVERY 12 HOURS
Refills: 0 | Status: CANCELLED | OUTPATIENT
Start: 2024-12-16 | End: 2024-12-13

## 2024-12-09 RX ORDER — APIXABAN 2.5 MG/1
10 TABLET, FILM COATED ORAL EVERY 12 HOURS
Refills: 0 | Status: DISCONTINUED | OUTPATIENT
Start: 2024-12-09 | End: 2024-12-13

## 2024-12-09 RX ADMIN — Medication 166.67 MILLIGRAM(S): at 17:07

## 2024-12-09 RX ADMIN — ACETAMINOPHEN 500MG 975 MILLIGRAM(S): 500 TABLET, COATED ORAL at 22:43

## 2024-12-09 RX ADMIN — APIXABAN 10 MILLIGRAM(S): 2.5 TABLET, FILM COATED ORAL at 11:42

## 2024-12-09 RX ADMIN — FAMOTIDINE 20 MILLIGRAM(S): 20 TABLET, FILM COATED ORAL at 11:11

## 2024-12-09 RX ADMIN — OXYCODONE HYDROCHLORIDE 10 MILLIGRAM(S): 30 TABLET ORAL at 05:25

## 2024-12-09 RX ADMIN — Medication 25 GRAM(S): at 00:31

## 2024-12-09 RX ADMIN — LIDOCAINE 1 PATCH: 40 CREAM TOPICAL at 17:14

## 2024-12-09 RX ADMIN — POTASSIUM CHLORIDE 100 MILLIEQUIVALENT(S): 600 TABLET, EXTENDED RELEASE ORAL at 08:50

## 2024-12-09 RX ADMIN — Medication 1800 UNIT(S)/HR: at 07:33

## 2024-12-09 RX ADMIN — POTASSIUM CHLORIDE 100 MILLIEQUIVALENT(S): 600 TABLET, EXTENDED RELEASE ORAL at 01:34

## 2024-12-09 RX ADMIN — ACETAMINOPHEN 500MG 975 MILLIGRAM(S): 500 TABLET, COATED ORAL at 15:02

## 2024-12-09 RX ADMIN — POTASSIUM CHLORIDE 100 MILLIEQUIVALENT(S): 600 TABLET, EXTENDED RELEASE ORAL at 06:47

## 2024-12-09 RX ADMIN — ACETAMINOPHEN 500MG 975 MILLIGRAM(S): 500 TABLET, COATED ORAL at 14:02

## 2024-12-09 RX ADMIN — ACETAMINOPHEN 500MG 975 MILLIGRAM(S): 500 TABLET, COATED ORAL at 21:43

## 2024-12-09 RX ADMIN — LIDOCAINE 1 PATCH: 40 CREAM TOPICAL at 05:00

## 2024-12-09 RX ADMIN — CHLORHEXIDINE GLUCONATE 1 APPLICATION(S): 1.2 RINSE ORAL at 05:35

## 2024-12-09 RX ADMIN — POTASSIUM CHLORIDE 100 MILLIEQUIVALENT(S): 600 TABLET, EXTENDED RELEASE ORAL at 02:38

## 2024-12-09 RX ADMIN — ACETAMINOPHEN 500MG 975 MILLIGRAM(S): 500 TABLET, COATED ORAL at 06:25

## 2024-12-09 RX ADMIN — OXYCODONE HYDROCHLORIDE 10 MILLIGRAM(S): 30 TABLET ORAL at 06:25

## 2024-12-09 RX ADMIN — PIPERACILLIN SODIUM AND TAZOBACTAM SODIUM 25 GRAM(S): 4; .5 INJECTION, POWDER, LYOPHILIZED, FOR SOLUTION INTRAVENOUS at 14:05

## 2024-12-09 RX ADMIN — Medication 100 MILLIGRAM(S): at 21:44

## 2024-12-09 RX ADMIN — LIDOCAINE 1 PATCH: 40 CREAM TOPICAL at 05:11

## 2024-12-09 RX ADMIN — LIDOCAINE 1 PATCH: 40 CREAM TOPICAL at 19:39

## 2024-12-09 RX ADMIN — Medication 100 MILLIGRAM(S): at 14:02

## 2024-12-09 RX ADMIN — CHLORHEXIDINE GLUCONATE 1 APPLICATION(S): 1.2 RINSE ORAL at 05:25

## 2024-12-09 RX ADMIN — PIPERACILLIN SODIUM AND TAZOBACTAM SODIUM 25 GRAM(S): 4; .5 INJECTION, POWDER, LYOPHILIZED, FOR SOLUTION INTRAVENOUS at 06:49

## 2024-12-09 RX ADMIN — APIXABAN 10 MILLIGRAM(S): 2.5 TABLET, FILM COATED ORAL at 17:14

## 2024-12-09 RX ADMIN — POTASSIUM CHLORIDE 100 MILLIEQUIVALENT(S): 600 TABLET, EXTENDED RELEASE ORAL at 03:33

## 2024-12-09 RX ADMIN — TAMSULOSIN HYDROCHLORIDE 0.4 MILLIGRAM(S): 0.4 CAPSULE ORAL at 21:46

## 2024-12-09 RX ADMIN — POTASSIUM CHLORIDE 100 MILLIEQUIVALENT(S): 600 TABLET, EXTENDED RELEASE ORAL at 07:33

## 2024-12-09 RX ADMIN — PIPERACILLIN SODIUM AND TAZOBACTAM SODIUM 25 GRAM(S): 4; .5 INJECTION, POWDER, LYOPHILIZED, FOR SOLUTION INTRAVENOUS at 21:44

## 2024-12-09 RX ADMIN — Medication 100 MILLIGRAM(S): at 05:25

## 2024-12-09 RX ADMIN — Medication 1800 UNIT(S)/HR: at 06:03

## 2024-12-09 RX ADMIN — Medication 166.67 MILLIGRAM(S): at 05:26

## 2024-12-09 RX ADMIN — Medication 80 MILLIGRAM(S): at 22:21

## 2024-12-09 RX ADMIN — ACETAMINOPHEN 500MG 975 MILLIGRAM(S): 500 TABLET, COATED ORAL at 05:25

## 2024-12-09 NOTE — PROGRESS NOTE ADULT - PROBLEM SELECTOR PLAN 6
Diet: Soft and bite sized after extubation, advance as tolerated  DVT ppx: on heparin drip for PEs and DVT  Dispo: pending clinical course Diet: Soft and bite sized after extubation, advance as tolerated  DVT ppx: on heparin drip for PEs and DVT  Dispo: acute rehab
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
## 2024-12-09 NOTE — PROGRESS NOTE ADULT - SUBJECTIVE AND OBJECTIVE BOX
Tayler Chahal MD  EM/IM PGY-1  TEAMS  -----------------------------------------    ================== UNFINISHED NOTE =======================    INTERVAL HPI/OVERNIGHT EVENTS:    SUBJECTIVE: Patient seen and examined at bedside.     VITAL SIGNS:  T(C): 37.1 (12-09-24 @ 04:00), Max: 37.1 (12-09-24 @ 04:00)  HR: 104 (12-09-24 @ 04:00) (104 - 117)  BP: 159/95 (12-09-24 @ 04:00) (117/78 - 159/95)  RR: 18 (12-09-24 @ 04:00) (17 - 18)  SpO2: 96% (12-09-24 @ 04:00) (92% - 97%)    PHYSICAL EXAM:        MEDICATIONS:  MEDICATIONS  (STANDING):  acetaminophen     Tablet .. 975 milliGRAM(s) Oral every 8 hours  chlorhexidine 2% Cloths 1 Application(s) Topical <User Schedule>  chlorhexidine 4% Liquid 1 Application(s) Topical <User Schedule>  famotidine    Tablet 20 milliGRAM(s) Oral daily  heparin  Infusion.  Unit(s)/Hr (13 mL/Hr) IV Continuous <Continuous>  lidocaine   4% Patch 1 Patch Transdermal every 24 hours  melatonin 1 milliGRAM(s) Oral at bedtime  phenazopyridine 100 milliGRAM(s) Oral every 8 hours  piperacillin/tazobactam IVPB.. 3.375 Gram(s) IV Intermittent every 8 hours  polyethylene glycol 3350 17 Gram(s) Oral daily  potassium chloride  10 mEq/100 mL IVPB 10 milliEquivalent(s) IV Intermittent every 1 hour  senna 2 Tablet(s) Oral at bedtime  vancomycin  IVPB 1250 milliGRAM(s) IV Intermittent every 12 hours    MEDICATIONS  (PRN):  oxyCODONE    IR 5 milliGRAM(s) Oral every 6 hours PRN Moderate Pain (4 - 6)  oxyCODONE    IR 10 milliGRAM(s) Oral every 6 hours PRN Severe Pain (7 - 10)  simethicone 80 milliGRAM(s) Chew four times a day PRN Upset Stomach      LABS:                        9.5    8.75  )-----------( 143      ( 09 Dec 2024 05:37 )             29.7     12-09    141  |  106  |  6[L]  ----------------------------<  94  3.6   |  21[L]  |  0.73    Ca    8.6      09 Dec 2024 05:37  Phos  3.5     12-09  Mg     2.3     12-09      PTT - ( 09 Dec 2024 05:37 )  PTT:70.4 sec  Urinalysis Basic - ( 09 Dec 2024 05:37 )    Color: x / Appearance: x / SG: x / pH: x  Gluc: 94 mg/dL / Ketone: x  / Bili: x / Urobili: x   Blood: x / Protein: x / Nitrite: x   Leuk Esterase: x / RBC: x / WBC x   Sq Epi: x / Non Sq Epi: x / Bacteria: x         Tayler Chahal MD  EM/IM PGY-1  TEAMS  -----------------------------------------    INTERVAL HPI/OVERNIGHT EVENTS: No acute events overnight.    SUBJECTIVE: Patient seen and examined at bedside. He states he is ready for a regular diet. No dysuria, abdominal pain, chest pain, or difficulty breathing. He notes a large bowel movement last night. He continues to note some pain in his leg, back and arm but feel it is well controlled with pain regiment.    VITAL SIGNS:  T(C): 37.1 (12-09-24 @ 04:00), Max: 37.1 (12-09-24 @ 04:00)  HR: 104 (12-09-24 @ 04:00) (104 - 117)  BP: 159/95 (12-09-24 @ 04:00) (117/78 - 159/95)  RR: 18 (12-09-24 @ 04:00) (17 - 18)  SpO2: 96% (12-09-24 @ 04:00) (92% - 97%)    PHYSICAL EXAM:  General: WN/WD NAD  Head: Atraumatic  Eyes: EOM grossly intact, no scleral icterus  ENT: moist mucous membranes  Neurology: A&Ox3, nonfocal, CABA x 4  Respiratory: normal respiratory effort; CTAB  CV: Extremities warm and well perfused; normal S1/S2, no appreciable murmurs  Abdominal: Soft, nondistended; nontender  Extremities: wound vac in place to right lower extremity with surrounding erythema    MEDICATIONS:  MEDICATIONS  (STANDING):  acetaminophen     Tablet .. 975 milliGRAM(s) Oral every 8 hours  chlorhexidine 2% Cloths 1 Application(s) Topical <User Schedule>  chlorhexidine 4% Liquid 1 Application(s) Topical <User Schedule>  famotidine    Tablet 20 milliGRAM(s) Oral daily  heparin  Infusion.  Unit(s)/Hr (13 mL/Hr) IV Continuous <Continuous>  lidocaine   4% Patch 1 Patch Transdermal every 24 hours  melatonin 1 milliGRAM(s) Oral at bedtime  phenazopyridine 100 milliGRAM(s) Oral every 8 hours  piperacillin/tazobactam IVPB.. 3.375 Gram(s) IV Intermittent every 8 hours  polyethylene glycol 3350 17 Gram(s) Oral daily  potassium chloride  10 mEq/100 mL IVPB 10 milliEquivalent(s) IV Intermittent every 1 hour  senna 2 Tablet(s) Oral at bedtime  vancomycin  IVPB 1250 milliGRAM(s) IV Intermittent every 12 hours    MEDICATIONS  (PRN):  oxyCODONE    IR 5 milliGRAM(s) Oral every 6 hours PRN Moderate Pain (4 - 6)  oxyCODONE    IR 10 milliGRAM(s) Oral every 6 hours PRN Severe Pain (7 - 10)  simethicone 80 milliGRAM(s) Chew four times a day PRN Upset Stomach      LABS:                        9.5    8.75  )-----------( 143      ( 09 Dec 2024 05:37 )             29.7     12-09    141  |  106  |  6[L]  ----------------------------<  94  3.6   |  21[L]  |  0.73    Ca    8.6      09 Dec 2024 05:37  Phos  3.5     12-09  Mg     2.3     12-09      PTT - ( 09 Dec 2024 05:37 )  PTT:70.4 sec  Urinalysis Basic - ( 09 Dec 2024 05:37 )    Color: x / Appearance: x / SG: x / pH: x  Gluc: 94 mg/dL / Ketone: x  / Bili: x / Urobili: x   Blood: x / Protein: x / Nitrite: x   Leuk Esterase: x / RBC: x / WBC x   Sq Epi: x / Non Sq Epi: x / Bacteria: x

## 2024-12-09 NOTE — PROGRESS NOTE ADULT - PROBLEM SELECTOR PLAN 1
#Acute RVF in setting of massive Pulmonary Embolism  #LLE DVT  CTAP on 11/25 demonstrated deep vein thrombosis involving the left external iliac, common femoral   and superficial femoral veins as well as partially visualized lobar and segmental pulmonary thromboembolism  CTAPE on 12/4 showed Intraluminal filling defects in right main, right interlobar artery, and   lobar/segmental branches of pulmonary artery in all 5 lobes in keeping with pulmonary embolism. Consolidative opacities in the bilateral lower lobes, may represent atelectasis, pulmonary infarct, or a combination of both.    - f/u hypercoagulable workup due to PE occurrence while on therapeutic lovenox  - continue heparin gtt #Acute RVF in setting of massive Pulmonary Embolism  #LLE DVT  CTAP on 11/25 demonstrated deep vein thrombosis involving the left external iliac, common femoral   and superficial femoral veins as well as partially visualized lobar and segmental pulmonary thromboembolism  CTAPE on 12/4 showed Intraluminal filling defects in right main, right interlobar artery, and   lobar/segmental branches of pulmonary artery in all 5 lobes in keeping with pulmonary embolism. Consolidative opacities in the bilateral lower lobes, may represent atelectasis, pulmonary infarct, or a combination of both.    - f/u hypercoagulable workup due to PE occurrence while on therapeutic lovenox  - transition heparin gtt to eliquis
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
## 2024-12-09 NOTE — PROGRESS NOTE ADULT - SUBJECTIVE AND OBJECTIVE BOX
Vascular Cardiology Progress Note    DIRECT PROVIDER NUMBER: [phone] / Available on TEAMS    CC: PE    Interval Events:    - pt seen and examined at bedside, denies CP, SOB, palpitations    Allergies    No Known Allergies    Intolerances    	    MEDICATIONS:  apixaban 10 milliGRAM(s) Oral every 12 hours    piperacillin/tazobactam IVPB.. 3.375 Gram(s) IV Intermittent every 8 hours  vancomycin  IVPB 1250 milliGRAM(s) IV Intermittent every 12 hours      acetaminophen     Tablet .. 975 milliGRAM(s) Oral every 8 hours  melatonin 1 milliGRAM(s) Oral at bedtime  oxyCODONE    IR 5 milliGRAM(s) Oral every 6 hours PRN  oxyCODONE    IR 10 milliGRAM(s) Oral every 6 hours PRN    famotidine    Tablet 20 milliGRAM(s) Oral daily  polyethylene glycol 3350 17 Gram(s) Oral daily  senna 2 Tablet(s) Oral at bedtime  simethicone 80 milliGRAM(s) Chew four times a day PRN      chlorhexidine 2% Cloths 1 Application(s) Topical <User Schedule>  chlorhexidine 4% Liquid 1 Application(s) Topical <User Schedule>  lidocaine   4% Patch 1 Patch Transdermal every 24 hours  phenazopyridine 100 milliGRAM(s) Oral every 8 hours  tamsulosin 0.4 milliGRAM(s) Oral at bedtime      PAST MEDICAL & SURGICAL HISTORY:  Hypertension      H/O cervical fracture      History of pelvic fracture        FAMILY HISTORY:  No pertinent family history in first degree relatives      SOCIAL HISTORY:  unchanged        PHYSICAL EXAM:  T(C): 36.9 (12-09-24 @ 11:38), Max: 37.1 (12-09-24 @ 04:00)  HR: 88 (12-09-24 @ 11:38) (88 - 117)  BP: 123/76 (12-09-24 @ 11:38) (117/78 - 159/95)  RR: 18 (12-09-24 @ 11:38) (17 - 18)  SpO2: 92% (12-09-24 @ 11:38) (92% - 97%)  Wt(kg): --  I&O's Summary    08 Dec 2024 07:01  -  09 Dec 2024 07:00  --------------------------------------------------------  IN: 616 mL / OUT: 2050 mL / NET: -1434 mL    09 Dec 2024 07:01  -  09 Dec 2024 13:29  --------------------------------------------------------  IN: 0 mL / OUT: 800 mL / NET: -800 mL        General: NAD, lying in hospital bed  CV: RRR, clear S1/S2, no m/r/g  Pulm: CTAB, no w/c/r  Abd: soft, NTND, +BS  Ext: WWP, no LE swelling, pulses intact    LABS:	 	  CBC Full  -  ( 09 Dec 2024 05:37 )  WBC Count : 8.75 K/uL  Hemoglobin : 9.5 g/dL  Hematocrit : 29.7 %  Platelet Count - Automated : 143 K/uL  Mean Cell Volume : 90.0 fl  Mean Cell Hemoglobin : 28.8 pg  Mean Cell Hemoglobin Concentration : 32.0 g/dL  Auto Neutrophil # : x  Auto Lymphocyte # : x  Auto Monocyte # : x  Auto Eosinophil # : x  Auto Basophil # : x  Auto Neutrophil % : x  Auto Lymphocyte % : x  Auto Monocyte % : x  Auto Eosinophil % : x  Auto Basophil % : x    12-09    141  |  106  |  6[L]  ----------------------------<  94  3.6   |  21[L]  |  0.73  12-08    136  |  102  |  6[L]  ----------------------------<  108[H]  3.3[L]   |  21[L]  |  0.67    Ca    8.6      09 Dec 2024 05:37  Ca    8.4      08 Dec 2024 22:14  Phos  3.5     12-09  Phos  3.3     12-08  Mg     2.3     12-09  Mg     1.7     12-08        Assessment:  1. High Risk PE provoked in the setting of pelvic and cervical fractures, immobility which happened on therapeutic a/c; likely that large burden proximal DVT embolized to the lungs, causing decompensation    2. s/p half dose tPA x2, extubated and weaned off pressors  3. Wound vac to R LE wound    Plan:  - Transitioned from hep gtt to apixaban 10mg BID loading dose followed by 5mg BID. APLS labs negative.  - Troponin down-trended. Lactate cleared.  - Recommend CT venogram abdomen / pelvis w/ IV contrast. For malignancy screening and to assess central veins.   - As outpatient age appropriate malignancy screening.   - Patient clinically improved. Continue to monitor on telemetry.  - Appreciate excellent care by Medicine and Surgery.       Mayur Hale MD  Cardiology Fellow, PGY-6    Please call with any questions:   DIRECT SERVICE NUMBER: [phone] / Available on TEAMS
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
## 2024-12-09 NOTE — PROGRESS NOTE ADULT - PROBLEM SELECTOR PLAN 2
#RLE cellulitis with wound vac  s/p unasyn 12/4    - PT following for wound care  - consider wound culture  - continue antibiotics        -vancomycin 12/4-12/11        -zosyn 12/4-12/11 #RLE cellulitis with wound vac  s/p unasyn 12/4    - PT following for wound care  - continue antibiotics        -vancomycin 12/4-12/11        -zosyn 12/4-12/11

## 2024-12-09 NOTE — PROGRESS NOTE ADULT - ATTENDING COMMENTS
High Risk PE s/p tpa at OSH and here  Note as above, likely provoked in the setting of immobility; age appropriate malignancy screening as outpatient. DOAC.

## 2024-12-10 LAB
ANA TITR SER: NEGATIVE — SIGNIFICANT CHANGE UP
ANION GAP SERPL CALC-SCNC: 13 MMOL/L — SIGNIFICANT CHANGE UP (ref 5–17)
APTT BLD: 33.5 SEC — SIGNIFICANT CHANGE UP (ref 24.5–35.6)
BUN SERPL-MCNC: 7 MG/DL — SIGNIFICANT CHANGE UP (ref 7–23)
CALCIUM SERPL-MCNC: 8.5 MG/DL — SIGNIFICANT CHANGE UP (ref 8.4–10.5)
CHLORIDE SERPL-SCNC: 107 MMOL/L — SIGNIFICANT CHANGE UP (ref 96–108)
CO2 SERPL-SCNC: 22 MMOL/L — SIGNIFICANT CHANGE UP (ref 22–31)
CREAT SERPL-MCNC: 0.89 MG/DL — SIGNIFICANT CHANGE UP (ref 0.5–1.3)
EGFR: 93 ML/MIN/1.73M2 — SIGNIFICANT CHANGE UP
GLUCOSE SERPL-MCNC: 93 MG/DL — SIGNIFICANT CHANGE UP (ref 70–99)
HCT VFR BLD CALC: 31.1 % — LOW (ref 39–50)
HGB BLD-MCNC: 9.6 G/DL — LOW (ref 13–17)
MAGNESIUM SERPL-MCNC: 2 MG/DL — SIGNIFICANT CHANGE UP (ref 1.6–2.6)
MCHC RBC-ENTMCNC: 28 PG — SIGNIFICANT CHANGE UP (ref 27–34)
MCHC RBC-ENTMCNC: 30.9 G/DL — LOW (ref 32–36)
MCV RBC AUTO: 90.7 FL — SIGNIFICANT CHANGE UP (ref 80–100)
NRBC # BLD: 0 /100 WBCS — SIGNIFICANT CHANGE UP (ref 0–0)
PHOSPHATE SERPL-MCNC: 4.2 MG/DL — SIGNIFICANT CHANGE UP (ref 2.5–4.5)
PLATELET # BLD AUTO: 199 K/UL — SIGNIFICANT CHANGE UP (ref 150–400)
POTASSIUM SERPL-MCNC: 3.9 MMOL/L — SIGNIFICANT CHANGE UP (ref 3.5–5.3)
POTASSIUM SERPL-SCNC: 3.9 MMOL/L — SIGNIFICANT CHANGE UP (ref 3.5–5.3)
RBC # BLD: 3.43 M/UL — LOW (ref 4.2–5.8)
RBC # FLD: 14.4 % — SIGNIFICANT CHANGE UP (ref 10.3–14.5)
SODIUM SERPL-SCNC: 142 MMOL/L — SIGNIFICANT CHANGE UP (ref 135–145)
VANCOMYCIN TROUGH SERPL-MCNC: 17.8 UG/ML — SIGNIFICANT CHANGE UP (ref 10–20)
WBC # BLD: 8.55 K/UL — SIGNIFICANT CHANGE UP (ref 3.8–10.5)
WBC # FLD AUTO: 8.55 K/UL — SIGNIFICANT CHANGE UP (ref 3.8–10.5)

## 2024-12-10 PROCEDURE — 99233 SBSQ HOSP IP/OBS HIGH 50: CPT

## 2024-12-10 PROCEDURE — 99232 SBSQ HOSP IP/OBS MODERATE 35: CPT

## 2024-12-10 PROCEDURE — 99232 SBSQ HOSP IP/OBS MODERATE 35: CPT | Mod: GC

## 2024-12-10 RX ORDER — BENZOCAINE, MENTHOL 15; 3.6 MG/1; MG/1
1 LOZENGE ORAL EVERY 8 HOURS
Refills: 0 | Status: COMPLETED | OUTPATIENT
Start: 2024-12-10 | End: 2024-12-11

## 2024-12-10 RX ORDER — ONDANSETRON HYDROCHLORIDE 4 MG/1
4 TABLET, FILM COATED ORAL ONCE
Refills: 0 | Status: COMPLETED | OUTPATIENT
Start: 2024-12-10 | End: 2024-12-10

## 2024-12-10 RX ORDER — LIDOCAINE 40 MG/G
1 CREAM TOPICAL ONCE
Refills: 0 | Status: COMPLETED | OUTPATIENT
Start: 2024-12-10 | End: 2024-12-10

## 2024-12-10 RX ORDER — LIDOCAINE 40 MG/G
1 CREAM TOPICAL EVERY 24 HOURS
Refills: 0 | Status: DISCONTINUED | OUTPATIENT
Start: 2024-12-10 | End: 2024-12-13

## 2024-12-10 RX ORDER — LIDOCAINE 40 MG/G
1 CREAM TOPICAL EVERY 24 HOURS
Refills: 0 | Status: DISCONTINUED | OUTPATIENT
Start: 2024-12-10 | End: 2024-12-10

## 2024-12-10 RX ADMIN — BENZOCAINE, MENTHOL 1 LOZENGE: 15; 3.6 LOZENGE ORAL at 13:13

## 2024-12-10 RX ADMIN — LIDOCAINE 1 PATCH: 40 CREAM TOPICAL at 19:24

## 2024-12-10 RX ADMIN — OXYCODONE HYDROCHLORIDE 10 MILLIGRAM(S): 30 TABLET ORAL at 21:28

## 2024-12-10 RX ADMIN — PIPERACILLIN SODIUM AND TAZOBACTAM SODIUM 25 GRAM(S): 4; .5 INJECTION, POWDER, LYOPHILIZED, FOR SOLUTION INTRAVENOUS at 05:15

## 2024-12-10 RX ADMIN — FAMOTIDINE 20 MILLIGRAM(S): 20 TABLET, FILM COATED ORAL at 11:11

## 2024-12-10 RX ADMIN — ACETAMINOPHEN 500MG 975 MILLIGRAM(S): 500 TABLET, COATED ORAL at 14:12

## 2024-12-10 RX ADMIN — PIPERACILLIN SODIUM AND TAZOBACTAM SODIUM 25 GRAM(S): 4; .5 INJECTION, POWDER, LYOPHILIZED, FOR SOLUTION INTRAVENOUS at 21:28

## 2024-12-10 RX ADMIN — Medication 2 TABLET(S): at 21:28

## 2024-12-10 RX ADMIN — ONDANSETRON HYDROCHLORIDE 4 MILLIGRAM(S): 4 TABLET, FILM COATED ORAL at 11:11

## 2024-12-10 RX ADMIN — ACETAMINOPHEN 500MG 975 MILLIGRAM(S): 500 TABLET, COATED ORAL at 13:12

## 2024-12-10 RX ADMIN — APIXABAN 10 MILLIGRAM(S): 2.5 TABLET, FILM COATED ORAL at 05:14

## 2024-12-10 RX ADMIN — Medication 166.67 MILLIGRAM(S): at 06:58

## 2024-12-10 RX ADMIN — BENZOCAINE, MENTHOL 1 LOZENGE: 15; 3.6 LOZENGE ORAL at 21:28

## 2024-12-10 RX ADMIN — LIDOCAINE 1 PATCH: 40 CREAM TOPICAL at 17:48

## 2024-12-10 RX ADMIN — Medication 166.67 MILLIGRAM(S): at 17:26

## 2024-12-10 RX ADMIN — TAMSULOSIN HYDROCHLORIDE 0.4 MILLIGRAM(S): 0.4 CAPSULE ORAL at 21:28

## 2024-12-10 RX ADMIN — APIXABAN 10 MILLIGRAM(S): 2.5 TABLET, FILM COATED ORAL at 17:26

## 2024-12-10 RX ADMIN — LIDOCAINE 1 PATCH: 40 CREAM TOPICAL at 05:33

## 2024-12-10 RX ADMIN — CHLORHEXIDINE GLUCONATE 1 APPLICATION(S): 1.2 RINSE ORAL at 05:14

## 2024-12-10 RX ADMIN — ACETAMINOPHEN, DIPHENHYDRAMINE HCL, PHENYLEPHRINE HCL 1 MILLIGRAM(S): 325; 25; 5 TABLET ORAL at 21:28

## 2024-12-10 RX ADMIN — PIPERACILLIN SODIUM AND TAZOBACTAM SODIUM 25 GRAM(S): 4; .5 INJECTION, POWDER, LYOPHILIZED, FOR SOLUTION INTRAVENOUS at 13:12

## 2024-12-10 RX ADMIN — OXYCODONE HYDROCHLORIDE 10 MILLIGRAM(S): 30 TABLET ORAL at 22:28

## 2024-12-10 RX ADMIN — Medication 80 MILLIGRAM(S): at 05:14
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
## 2024-12-10 NOTE — OCCUPATIONAL THERAPY INITIAL EVALUATION ADULT - LEVEL OF INDEPENDENCE: TOILET, REHAB EVAL
transferred to toilet via non-mechanical lift, increased assist to stand for low surface/moderate assist (50% patients effort)/maximum assist (25% patients effort)
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
## 2024-12-10 NOTE — PROGRESS NOTE ADULT - SUBJECTIVE AND OBJECTIVE BOX
Tayler Chahal MD  EM/IM PGY-1  TEAMS  -----------------------------------------    ================== UNFINISHED NOTE =======================    INTERVAL HPI/OVERNIGHT EVENTS:    SUBJECTIVE: Patient seen and examined at bedside.     VITAL SIGNS:  T(C): 37.1 (12-10-24 @ 04:18), Max: 37.1 (12-09-24 @ 16:55)  HR: 80 (12-10-24 @ 04:18) (80 - 106)  BP: 133/82 (12-10-24 @ 04:18) (119/76 - 160/88)  RR: 18 (12-10-24 @ 04:18) (17 - 18)  SpO2: 96% (12-10-24 @ 04:18) (92% - 96%)    PHYSICAL EXAM:        MEDICATIONS:  MEDICATIONS  (STANDING):  acetaminophen     Tablet .. 975 milliGRAM(s) Oral every 8 hours  apixaban 10 milliGRAM(s) Oral every 12 hours  chlorhexidine 2% Cloths 1 Application(s) Topical <User Schedule>  chlorhexidine 4% Liquid 1 Application(s) Topical <User Schedule>  famotidine    Tablet 20 milliGRAM(s) Oral daily  lidocaine   4% Patch 1 Patch Transdermal every 24 hours  melatonin 1 milliGRAM(s) Oral at bedtime  piperacillin/tazobactam IVPB.. 3.375 Gram(s) IV Intermittent every 8 hours  polyethylene glycol 3350 17 Gram(s) Oral daily  senna 2 Tablet(s) Oral at bedtime  tamsulosin 0.4 milliGRAM(s) Oral at bedtime  vancomycin  IVPB 1250 milliGRAM(s) IV Intermittent every 12 hours    MEDICATIONS  (PRN):  oxyCODONE    IR 5 milliGRAM(s) Oral every 6 hours PRN Moderate Pain (4 - 6)  oxyCODONE    IR 10 milliGRAM(s) Oral every 6 hours PRN Severe Pain (7 - 10)  simethicone 80 milliGRAM(s) Chew four times a day PRN Upset Stomach      LABS:                        9.6    8.55  )-----------( 199      ( 10 Dec 2024 04:58 )             31.1     12-10    142  |  107  |  7   ----------------------------<  93  3.9   |  22  |  0.89    Ca    8.5      10 Dec 2024 04:58  Phos  4.2     12-10  Mg     2.0     12-10      PTT - ( 10 Dec 2024 04:58 )  PTT:33.5 sec  Urinalysis Basic - ( 10 Dec 2024 04:58 )    Color: x / Appearance: x / SG: x / pH: x  Gluc: 93 mg/dL / Ketone: x  / Bili: x / Urobili: x   Blood: x / Protein: x / Nitrite: x   Leuk Esterase: x / RBC: x / WBC x   Sq Epi: x / Non Sq Epi: x / Bacteria: x         Tayler Chahal MD  EM/IM PGY-1  TEAMS  -----------------------------------------    INTERVAL HPI/OVERNIGHT EVENTS: No acute overnight events.     SUBJECTIVE: Patient seen and examined at bedside, sitting in chair next to bed. Patient notes some dizziness and nausea when he looks at food, otherwise no dizziness or nausea. He also continues to note some throat soreness, especially when he eats. No difficulty breathing or chest pain or any other acute complaints.     VITAL SIGNS:  T(C): 37.1 (12-10-24 @ 04:18), Max: 37.1 (12-09-24 @ 16:55)  HR: 80 (12-10-24 @ 04:18) (80 - 106)  BP: 133/82 (12-10-24 @ 04:18) (119/76 - 160/88)  RR: 18 (12-10-24 @ 04:18) (17 - 18)  SpO2: 96% (12-10-24 @ 04:18) (92% - 96%)    PHYSICAL EXAM:  General: WN/WD NAD, wearing TLSO brace  Head: Atraumatic  Eyes: EOM grossly intact, no scleral icterus  ENT: moist mucous membranes  Neurology: A&Ox3, nonfocal, CABA x 4  Respiratory: normal respiratory effort; CTAB  CV: Extremities warm and well perfused; normal S1/S2, no appreciable murmurs  Abdominal: Soft, nondistended; nontender  Extremities: wound vac in place to right lower extremity with surrounding erythema, improved from yesterday     MEDICATIONS:  MEDICATIONS  (STANDING):  acetaminophen     Tablet .. 975 milliGRAM(s) Oral every 8 hours  apixaban 10 milliGRAM(s) Oral every 12 hours  chlorhexidine 2% Cloths 1 Application(s) Topical <User Schedule>  chlorhexidine 4% Liquid 1 Application(s) Topical <User Schedule>  famotidine    Tablet 20 milliGRAM(s) Oral daily  lidocaine   4% Patch 1 Patch Transdermal every 24 hours  melatonin 1 milliGRAM(s) Oral at bedtime  piperacillin/tazobactam IVPB.. 3.375 Gram(s) IV Intermittent every 8 hours  polyethylene glycol 3350 17 Gram(s) Oral daily  senna 2 Tablet(s) Oral at bedtime  tamsulosin 0.4 milliGRAM(s) Oral at bedtime  vancomycin  IVPB 1250 milliGRAM(s) IV Intermittent every 12 hours    MEDICATIONS  (PRN):  oxyCODONE    IR 5 milliGRAM(s) Oral every 6 hours PRN Moderate Pain (4 - 6)  oxyCODONE    IR 10 milliGRAM(s) Oral every 6 hours PRN Severe Pain (7 - 10)  simethicone 80 milliGRAM(s) Chew four times a day PRN Upset Stomach      LABS:                        9.6    8.55  )-----------( 199      ( 10 Dec 2024 04:58 )             31.1     12-10    142  |  107  |  7   ----------------------------<  93  3.9   |  22  |  0.89    Ca    8.5      10 Dec 2024 04:58  Phos  4.2     12-10  Mg     2.0     12-10      PTT - ( 10 Dec 2024 04:58 )  PTT:33.5 sec  Urinalysis Basic - ( 10 Dec 2024 04:58 )    Color: x / Appearance: x / SG: x / pH: x  Gluc: 93 mg/dL / Ketone: x  / Bili: x / Urobili: x   Blood: x / Protein: x / Nitrite: x   Leuk Esterase: x / RBC: x / WBC x   Sq Epi: x / Non Sq Epi: x / Bacteria: x

## 2024-12-10 NOTE — PROVIDER CONTACT NOTE (OTHER) - DATE AND TIME:
08-Dec-2024 00:16
10-Dec-2024 06:00
06-Dec-2024 13:16
06-Dec-2024 18:55
06-Dec-2024 22:48
06-Dec-2024 15:16
08-Dec-2024 23:30

## 2024-12-10 NOTE — PROVIDER CONTACT NOTE (OTHER) - REASON
Pt c/o pain with urination
Bloody sputum
PAT 96 sec x 140 HR
Pt c/o dizziness
Patient has bruising with swelling on right groin
Patient has what appear to be a right thigh hematoma
Johansen catheter got stuck at meatus when d/c

## 2024-12-10 NOTE — PROGRESS NOTE ADULT - PROBLEM SELECTOR PLAN 6
Diet: Soft and bite sized after extubation, advance as tolerated  DVT ppx: covered with full AC eliquis  Dispo: acute rehab

## 2024-12-10 NOTE — PROVIDER CONTACT NOTE (OTHER) - ACTION/TREATMENT ORDERED:
T4 Tayler Chahal notified, will assess at bedside. Johansen left as is for now. Plan of care ongoing
PATRICK Méndez MD came to assess patient at bedside. Patient received Tylenol for pain. Monitoring in progress.
As per Idalmis Quigley T4, bladder scans to be ordered. Plan of care ongoing
JUSTINO Luna TEAMS notified. MD on unit to examine bruising. Awaiting further instructions from MD. Monitoring remains ongoing.
T4 Tayler Chahal notified, no interventions ordered. Plan of care ongoing
As per Hung Womack T4, obtain a set of orthos
T4 Tayler Chahal notifed, no interventions ordered. Plan of care ongoing

## 2024-12-10 NOTE — PROVIDER CONTACT NOTE (OTHER) - ASSESSMENT
Pt A/Ox3. Pt c/o pain with urination. Urine is caity color. Pt also c/o urinary frequency and hesitancy. No blood noted. Pt s/p mcclellan from 12/6. Bladder scan done and showed 418 ml.
Pt A&Ox2, able to make needs known. Pt asymptomatic. VSS. No s/s of bleeding. Pt denies cp, denies SOB. Heparin gtt 13ml/hr
Pt A&Ox2, able to make needs known. Pt asymptomatic. VSS. No s/s of bleeding. Pt denies cp, denies SOB. Heparin gtt 13ml/hr. Dx PE.
Pt A&Ox2, able to make needs known. Pt asymptomatic. VSS. No s/s of bleeding. Pt denies cp, denies SOB. Pt felt pain when mcclellan was at the meatus. Stopped taking mcclellan out and left in place.
Pt c/o dizziness. Pt A/Ox3. Pt has been sleeping in bed all night and c/o dizziness. No other c/o HA, CP, palpitations, SOB.
Patient A&Ox2, VSS. Patient Tajik speaking with son at bedside. Bruising on right groin noted with swelling.

## 2024-12-10 NOTE — PROGRESS NOTE ADULT - ASSESSMENT
***INCOMPLETE NOTE***  Assessment:  1. High Risk PE provoked in the setting of pelvic and cervical fractures, immobility which happened on therapeutic a/c; likely that large burden proximal DVT embolized to the lungs, causing decompensation  2. s/p half dose tPA x2, extubated and weaned off pressors    Plan:  - Transitioned from hep gtt to apixaban 10mg BID loading dose followed by 5mg BID. APLS labs negative.  - Troponin down-trended. Lactate cleared.  - Recommend CT venogram abdomen/pelvis w/ IV contrast. For malignancy screening and to assess central veins.   - As outpatient age appropriate malignancy screening.   - Patient clinically improved. Continue to monitor on telemetry. Ultimate DC to acute rehab  - Appreciate excellent care by Medicine and Surgery.    Marcin Muniz MD  Cardiology Fellow  All Cardiology service information can be found 24/7 on [email or web address], password: [email or web address]    Please call with any questions:   DIRECT SERVICE NUMBER: [phone] / Available on TEAMS

## 2024-12-10 NOTE — OCCUPATIONAL THERAPY INITIAL EVALUATION ADULT - STRENGTHENING, PT EVAL
Patient will increase strength in LUE  by 1/2 grade in two weeks to facilitate increased ability to perform ADLs and functional mobility

## 2024-12-10 NOTE — PROVIDER CONTACT NOTE (OTHER) - RECOMMENDATIONS
Notify NF Delano WIN
Notify T4 Tayler Chahal
Idalmis Quigley T4 aware
Notify T4 Tayler Chahal
Lyric Méndez MD notified
Notify T4 Tayler Chahal
Hung Womack T4 aware

## 2024-12-10 NOTE — PROVIDER CONTACT NOTE (OTHER) - SITUATION
PAT 96 sec x 140 HR
Patient has what appear to be a right thigh hematoma
Patient has bruising with swelling on right groin
Johansen catheter got stuck at meatus when d/c
Pt c/o dizziness
Bloody sputum
Pt c/o pain with urination

## 2024-12-10 NOTE — PROVIDER CONTACT NOTE (OTHER) - NAME OF MD/NP/PA/DO NOTIFIED:
Hung MONTEIRO
T4 Tayler Chahal
T4 Tayler Chahal
JUSTINO Luna TEAMS
Lyric Méndez MD
T4 Tayler Chahal
Idalmis MONTEIRO

## 2024-12-10 NOTE — PROGRESS NOTE ADULT - PROBLEM SELECTOR PLAN 2
#RLE cellulitis with wound vac  s/p unasyn 12/4    - PT following for wound care  - continue antibiotics        -vancomycin 12/4-12/11        -zosyn 12/4-12/11
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
## 2024-12-10 NOTE — PROGRESS NOTE ADULT - SUBJECTIVE AND OBJECTIVE BOX
***INCOMPLETE NOTE***     Marcin Muniz MD  Cardiology Fellow  All Cardiology service information can be found 24/7 on [email or web address], password: "[email or web address]    Overnight Events:       REVIEW OF SYSTEMS:  Constitutional:     [ ] negative [ ] fevers [ ] chills [ ] weight loss [ ] weight gain  HEENT:                  [ ] negative [ ] dry eyes [ ] eye irritation [ ] postnasal drip [ ] nasal congestion  CV:                         [ ] negative  [ ] chest pain [ ] orthopnea [ ] palpitations [ ] murmur  Resp:                     [ ] negative [ ] cough [ ] shortness of breath [ ] dyspnea [ ] wheezing [ ] sputum [ ]hemoptysis  GI:                          [ ] negative [ ] nausea [ ] vomiting [ ] diarrhea [ ] constipation [ ] abd pain [ ] dysphagia   :                        [ ] negative [ ] dysuria [ ] nocturia [ ] hematuria [ ] increased urinary frequency  Musculoskeletal: [ ] negative [ ] back pain [ ] myalgias [ ] arthralgias [ ] fracture  Skin:                       [ ] negative [ ] rash [ ] itch  Neurological:        [ ] negative [ ] headache [ ] dizziness [ ] syncope [ ] weakness [ ] numbness  Psychiatric:           [ ] negative [ ] anxiety [ ] depression  Endocrine:            [ ] negative [ ] diabetes [ ] thyroid problem  Heme/Lymph:      [ ] negative [ ] anemia [ ] bleeding problem  Allergic/Immune: [ ] negative [ ] itchy eyes [ ] nasal discharge [ ] hives [ ] angioedema    [ ] All other systems negative  [ ] Unable to assess ROS due to    Current Meds:  acetaminophen     Tablet .. 975 milliGRAM(s) Oral every 8 hours  apixaban 10 milliGRAM(s) Oral every 12 hours  benzocaine/menthol Lozenge 1 Lozenge Oral every 8 hours PRN  chlorhexidine 2% Cloths 1 Application(s) Topical <User Schedule>  chlorhexidine 4% Liquid 1 Application(s) Topical <User Schedule>  famotidine    Tablet 20 milliGRAM(s) Oral daily  lidocaine   4% Patch 1 Patch Transdermal every 24 hours  melatonin 1 milliGRAM(s) Oral at bedtime  oxyCODONE    IR 5 milliGRAM(s) Oral every 6 hours PRN  oxyCODONE    IR 10 milliGRAM(s) Oral every 6 hours PRN  piperacillin/tazobactam IVPB.. 3.375 Gram(s) IV Intermittent every 8 hours  polyethylene glycol 3350 17 Gram(s) Oral daily  senna 2 Tablet(s) Oral at bedtime  simethicone 80 milliGRAM(s) Chew four times a day PRN  tamsulosin 0.4 milliGRAM(s) Oral at bedtime  vancomycin  IVPB 1250 milliGRAM(s) IV Intermittent every 12 hours      PAST MEDICAL & SURGICAL HISTORY:  Hypertension      H/O cervical fracture      History of pelvic fracture          Vitals:  T(F): 98.8 (12-10), Max: 98.8 (12-10)  HR: 77 (12-10) (77 - 106)  BP: 114/81 (12-10) (103/72 - 160/88)  RR: 18 (12-10)  SpO2: 95% (12-10)  I&O's Summary    09 Dec 2024 07:01  -  10 Dec 2024 07:00  --------------------------------------------------------  IN: 0 mL / OUT: 1400 mL / NET: -1400 mL    10 Dec 2024 07:01  -  10 Dec 2024 12:08  --------------------------------------------------------  IN: 120 mL / OUT: 0 mL / NET: 120 mL        Physical Exam:  Appearance: No acute distress; well appearing  Eyes: PERRL, EOMI, pink conjunctiva  HENT: Normal oral mucosa  Cardiovascular: RRR, S1, S2, no murmurs, rubs, or gallops; no edema; no JVD  Respiratory: Clear to auscultation bilaterally  Gastrointestinal: soft, non-tender, non-distended with normal bowel sounds  Musculoskeletal: No clubbing; no joint deformity   Neurologic: Non-focal  Psychiatry: AAOx3, mood & affect appropriate  Skin: No rashes, ecchymoses, or cyanosis                          9.6    8.55  )-----------( 199      ( 10 Dec 2024 04:58 )             31.1     12-10    142  |  107  |  7   ----------------------------<  93  3.9   |  22  |  0.89    Ca    8.5      10 Dec 2024 04:58  Phos  4.2     12-10  Mg     2.0     12-10      PTT - ( 10 Dec 2024 04:58 )  PTT:33.5 sec              New ECG(s): Personally reviewed    Echo:    Stress Testing:     Cath:    Imaging:    Interpretation of Telemetry:   Marcin Muniz MD  Cardiology Fellow  All Cardiology service information can be found 24/7 on [email or web address], password: [email or web address]    Overnight Events:   No acute events overnight. Feeling slightly improved.  Patient seen and examined at bedside.  No chest pain, shortness of breath, or palpitations. Endorses cough with deep breath, and blood tinged sputum when coughing. Also endorses sore throat.    Current Meds:  acetaminophen     Tablet .. 975 milliGRAM(s) Oral every 8 hours  apixaban 10 milliGRAM(s) Oral every 12 hours  benzocaine/menthol Lozenge 1 Lozenge Oral every 8 hours PRN  chlorhexidine 2% Cloths 1 Application(s) Topical <User Schedule>  chlorhexidine 4% Liquid 1 Application(s) Topical <User Schedule>  famotidine    Tablet 20 milliGRAM(s) Oral daily  lidocaine   4% Patch 1 Patch Transdermal every 24 hours  melatonin 1 milliGRAM(s) Oral at bedtime  oxyCODONE    IR 5 milliGRAM(s) Oral every 6 hours PRN  oxyCODONE    IR 10 milliGRAM(s) Oral every 6 hours PRN  piperacillin/tazobactam IVPB.. 3.375 Gram(s) IV Intermittent every 8 hours  polyethylene glycol 3350 17 Gram(s) Oral daily  senna 2 Tablet(s) Oral at bedtime  simethicone 80 milliGRAM(s) Chew four times a day PRN  tamsulosin 0.4 milliGRAM(s) Oral at bedtime  vancomycin  IVPB 1250 milliGRAM(s) IV Intermittent every 12 hours      PAST MEDICAL & SURGICAL HISTORY:  Hypertension      H/O cervical fracture      History of pelvic fracture          Vitals:  T(F): 98.8 (12-10), Max: 98.8 (12-10)  HR: 77 (12-10) (77 - 106)  BP: 114/81 (12-10) (103/72 - 160/88)  RR: 18 (12-10)  SpO2: 95% (12-10)  I&O's Summary    09 Dec 2024 07:01  -  10 Dec 2024 07:00  --------------------------------------------------------  IN: 0 mL / OUT: 1400 mL / NET: -1400 mL    10 Dec 2024 07:01  -  10 Dec 2024 12:08  --------------------------------------------------------  IN: 120 mL / OUT: 0 mL / NET: 120 mL        Physical Exam:  Appearance: No acute distress; well appearing  Eyes: PERRL, EOMI, pink conjunctiva  HENT: Normal oral mucosa  Cardiovascular: RRR, S1, S2, no murmurs, rubs, or gallops; 1+ BLE edema to shins;  Respiratory: Clear to auscultation bilaterally  Musculoskeletal: No clubbing; no joint deformity   Skin: No rashes, ecchymoses, or cyanosis                          9.6    8.55  )-----------( 199      ( 10 Dec 2024 04:58 )             31.1     12-10    142  |  107  |  7   ----------------------------<  93  3.9   |  22  |  0.89    Ca    8.5      10 Dec 2024 04:58  Phos  4.2     12-10  Mg     2.0     12-10      PTT - ( 10 Dec 2024 04:58 )  PTT:33.5 sec      New ECG(s): Personally reviewed    Echo:    Stress Testing:     Cath:    Imaging:    Interpretation of Telemetry:

## 2024-12-10 NOTE — PROVIDER CONTACT NOTE (OTHER) - BACKGROUND
Pt transferred to [facility] from CCU, admitted for chest pain and SOB.
69 year old male admitted for cardiogenic shock. PMH HTN.
Pt admit of cardiogenic shock. Pt on medicine floor for further management of cellulitis and PEs
Patient admitted for cardiogenic shock with PMH of HTN, sepsis, pelvic fracture, DVT and PE.
Pt admitted for PE. Heparin gtt 13ml/hr
Pt admit of cardiogenic shock. Pt on medicine floor for further management of cellulitis and PEs
Pt admitted to [facility] from CCU. Admitted for SOB, chest pain.
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
## 2024-12-10 NOTE — PROGRESS NOTE ADULT - PROBLEM SELECTOR PLAN 1
#Acute RVF in setting of massive Pulmonary Embolism  #LLE DVT  CTAP on 11/25 demonstrated deep vein thrombosis involving the left external iliac, common femoral   and superficial femoral veins as well as partially visualized lobar and segmental pulmonary thromboembolism  CTAPE on 12/4 showed Intraluminal filling defects in right main, right interlobar artery, and   lobar/segmental branches of pulmonary artery in all 5 lobes in keeping with pulmonary embolism. Consolidative opacities in the bilateral lower lobes, may represent atelectasis, pulmonary infarct, or a combination of both.    - f/u hypercoagulable workup due to PE occurrence while on therapeutic lovenox  - Continue eliquis

## 2024-12-11 PROCEDURE — 99231 SBSQ HOSP IP/OBS SF/LOW 25: CPT | Mod: GC

## 2024-12-11 PROCEDURE — 99233 SBSQ HOSP IP/OBS HIGH 50: CPT

## 2024-12-11 RX ADMIN — Medication 166.67 MILLIGRAM(S): at 05:04

## 2024-12-11 RX ADMIN — APIXABAN 10 MILLIGRAM(S): 2.5 TABLET, FILM COATED ORAL at 17:49

## 2024-12-11 RX ADMIN — Medication 166.67 MILLIGRAM(S): at 17:49

## 2024-12-11 RX ADMIN — CHLORHEXIDINE GLUCONATE 1 APPLICATION(S): 1.2 RINSE ORAL at 05:04

## 2024-12-11 RX ADMIN — ACETAMINOPHEN 500MG 975 MILLIGRAM(S): 500 TABLET, COATED ORAL at 13:34

## 2024-12-11 RX ADMIN — ACETAMINOPHEN 500MG 975 MILLIGRAM(S): 500 TABLET, COATED ORAL at 14:34

## 2024-12-11 RX ADMIN — OXYCODONE HYDROCHLORIDE 5 MILLIGRAM(S): 30 TABLET ORAL at 10:34

## 2024-12-11 RX ADMIN — BENZOCAINE, MENTHOL 1 LOZENGE: 15; 3.6 LOZENGE ORAL at 11:52

## 2024-12-11 RX ADMIN — LIDOCAINE 1 PATCH: 40 CREAM TOPICAL at 04:58

## 2024-12-11 RX ADMIN — PIPERACILLIN SODIUM AND TAZOBACTAM SODIUM 25 GRAM(S): 4; .5 INJECTION, POWDER, LYOPHILIZED, FOR SOLUTION INTRAVENOUS at 13:34

## 2024-12-11 RX ADMIN — CHLORHEXIDINE GLUCONATE 1 APPLICATION(S): 1.2 RINSE ORAL at 05:03

## 2024-12-11 RX ADMIN — ACETAMINOPHEN 500MG 975 MILLIGRAM(S): 500 TABLET, COATED ORAL at 06:03

## 2024-12-11 RX ADMIN — APIXABAN 10 MILLIGRAM(S): 2.5 TABLET, FILM COATED ORAL at 05:03

## 2024-12-11 RX ADMIN — ACETAMINOPHEN 500MG 975 MILLIGRAM(S): 500 TABLET, COATED ORAL at 05:03

## 2024-12-11 RX ADMIN — FAMOTIDINE 20 MILLIGRAM(S): 20 TABLET, FILM COATED ORAL at 11:51

## 2024-12-11 RX ADMIN — PIPERACILLIN SODIUM AND TAZOBACTAM SODIUM 25 GRAM(S): 4; .5 INJECTION, POWDER, LYOPHILIZED, FOR SOLUTION INTRAVENOUS at 05:04

## 2024-12-11 RX ADMIN — OXYCODONE HYDROCHLORIDE 5 MILLIGRAM(S): 30 TABLET ORAL at 09:34

## 2024-12-11 NOTE — PROGRESS NOTE ADULT - SUBJECTIVE AND OBJECTIVE BOX
Vascular Cardiology Progress Note    DIRECT PROVIDER NUMBER: [phone] / Available on TEAMS    CC: PE    Interval Events:    - seen and examined at bedside, remains on RA, denies CP, SOB, light-headed/dizzy/fainting    Allergies    No Known Allergies    Intolerances    	    MEDICATIONS:  apixaban 10 milliGRAM(s) Oral every 12 hours    piperacillin/tazobactam IVPB.. 3.375 Gram(s) IV Intermittent every 8 hours  vancomycin  IVPB 1250 milliGRAM(s) IV Intermittent every 12 hours      acetaminophen     Tablet .. 975 milliGRAM(s) Oral every 8 hours  melatonin 1 milliGRAM(s) Oral at bedtime  oxyCODONE    IR 5 milliGRAM(s) Oral every 6 hours PRN  oxyCODONE    IR 10 milliGRAM(s) Oral every 6 hours PRN    famotidine    Tablet 20 milliGRAM(s) Oral daily  polyethylene glycol 3350 17 Gram(s) Oral daily  senna 2 Tablet(s) Oral at bedtime  simethicone 80 milliGRAM(s) Chew four times a day PRN      chlorhexidine 2% Cloths 1 Application(s) Topical <User Schedule>  chlorhexidine 4% Liquid 1 Application(s) Topical <User Schedule>  lidocaine   4% Patch 1 Patch Transdermal every 24 hours PRN  tamsulosin 0.4 milliGRAM(s) Oral at bedtime      PAST MEDICAL & SURGICAL HISTORY:  Hypertension      H/O cervical fracture      History of pelvic fracture        FAMILY HISTORY:  No pertinent family history in first degree relatives      SOCIAL HISTORY:  unchanged        PHYSICAL EXAM:  T(C): 36.7 (12-11-24 @ 08:18), Max: 37.2 (12-10-24 @ 16:24)  HR: 72 (12-11-24 @ 08:18) (72 - 103)  BP: 131/76 (12-11-24 @ 08:18) (117/76 - 131/76)  RR: 18 (12-11-24 @ 08:18) (17 - 18)  SpO2: 93% (12-11-24 @ 08:18) (93% - 97%)  Wt(kg): --  I&O's Summary    10 Dec 2024 07:01  -  11 Dec 2024 07:00  --------------------------------------------------------  IN: 300 mL / OUT: 775 mL / NET: -475 mL    11 Dec 2024 07:01  -  11 Dec 2024 12:02  --------------------------------------------------------  IN: 220 mL / OUT: 300 mL / NET: -80 mL      Physical Exam:  Appearance: No acute distress; well appearing  Eyes: PERRL, EOMI, pink conjunctiva  HENT: Normal oral mucosa  Cardiovascular: RRR, S1, S2, no murmurs, rubs, or gallops  Respiratory: Clear to auscultation bilaterally  Musculoskeletal: No clubbing; no joint deformity   Ext: WWP, no LE edema b/l      LABS:	 	  CBC Full  -  ( 10 Dec 2024 04:58 )  WBC Count : 8.55 K/uL  Hemoglobin : 9.6 g/dL  Hematocrit : 31.1 %  Platelet Count - Automated : 199 K/uL  Mean Cell Volume : 90.7 fl  Mean Cell Hemoglobin : 28.0 pg  Mean Cell Hemoglobin Concentration : 30.9 g/dL  Auto Neutrophil # : x  Auto Lymphocyte # : x  Auto Monocyte # : x  Auto Eosinophil # : x  Auto Basophil # : x  Auto Neutrophil % : x  Auto Lymphocyte % : x  Auto Monocyte % : x  Auto Eosinophil % : x  Auto Basophil % : x    12-10    142  |  107  |  7   ----------------------------<  93  3.9   |  22  |  0.89    Ca    8.5      10 Dec 2024 04:58  Phos  4.2     12-10  Mg     2.0     12-10        Assessment:  1. High Risk PE provoked in the setting of pelvic and cervical fractures, immobility which happened on therapeutic a/c; likely that large burden proximal DVT embolized to the lungs, causing decompensation  2. s/p half dose tPA x2, extubated and weaned off pressors    Plan:  - Transitioned from hep gtt to apixaban 10mg BID loading dose followed by 5mg BID. APLS labs negative.  - Troponin down-trended. Lactate cleared.  - CT A/P on 11/25 w/ patent L common iliac vein, thrombosis of external iliac, common femoral, and superficial femoral veins. No evidence of malignancy seen.  - As outpatient age appropriate malignancy screening.   - Patient clinically improved. Continue to monitor on telemetry. Ultimate DC to acute rehab  - Appreciate excellent care by Medicine and Surgery.       Mayur Hale MD  Cardiology Fellow, PGY-6    Please call with any questions:   DIRECT SERVICE NUMBER: [phone] / Available on TEAMS Vascular Cardiology Progress Note    DIRECT PROVIDER NUMBER: [phone] / Available on TEAMS    CC: PE    Interval Events:    - seen and examined at bedside, remains on RA, denies CP, SOB, light-headed/dizzy/fainting    Allergies    No Known Allergies    Intolerances    	    MEDICATIONS:  apixaban 10 milliGRAM(s) Oral every 12 hours    piperacillin/tazobactam IVPB.. 3.375 Gram(s) IV Intermittent every 8 hours  vancomycin  IVPB 1250 milliGRAM(s) IV Intermittent every 12 hours      acetaminophen     Tablet .. 975 milliGRAM(s) Oral every 8 hours  melatonin 1 milliGRAM(s) Oral at bedtime  oxyCODONE    IR 5 milliGRAM(s) Oral every 6 hours PRN  oxyCODONE    IR 10 milliGRAM(s) Oral every 6 hours PRN    famotidine    Tablet 20 milliGRAM(s) Oral daily  polyethylene glycol 3350 17 Gram(s) Oral daily  senna 2 Tablet(s) Oral at bedtime  simethicone 80 milliGRAM(s) Chew four times a day PRN      chlorhexidine 2% Cloths 1 Application(s) Topical <User Schedule>  chlorhexidine 4% Liquid 1 Application(s) Topical <User Schedule>  lidocaine   4% Patch 1 Patch Transdermal every 24 hours PRN  tamsulosin 0.4 milliGRAM(s) Oral at bedtime      PAST MEDICAL & SURGICAL HISTORY:  Hypertension      H/O cervical fracture      History of pelvic fracture        FAMILY HISTORY:  No pertinent family history in first degree relatives      SOCIAL HISTORY:  unchanged        PHYSICAL EXAM:  T(C): 36.7 (12-11-24 @ 08:18), Max: 37.2 (12-10-24 @ 16:24)  HR: 72 (12-11-24 @ 08:18) (72 - 103)  BP: 131/76 (12-11-24 @ 08:18) (117/76 - 131/76)  RR: 18 (12-11-24 @ 08:18) (17 - 18)  SpO2: 93% (12-11-24 @ 08:18) (93% - 97%)  Wt(kg): --  I&O's Summary    10 Dec 2024 07:01  -  11 Dec 2024 07:00  --------------------------------------------------------  IN: 300 mL / OUT: 775 mL / NET: -475 mL    11 Dec 2024 07:01  -  11 Dec 2024 12:02  --------------------------------------------------------  IN: 220 mL / OUT: 300 mL / NET: -80 mL      Physical Exam:  Appearance: No acute distress; well appearing  Eyes: PERRL, EOMI, pink conjunctiva  HENT: Normal oral mucosa  Cardiovascular: RRR, S1, S2, no murmurs, rubs, or gallops  Respiratory: Clear to auscultation bilaterally  Musculoskeletal: No clubbing; no joint deformity   Ext: WWP, no LE edema b/l      LABS:	 	  CBC Full  -  ( 10 Dec 2024 04:58 )  WBC Count : 8.55 K/uL  Hemoglobin : 9.6 g/dL  Hematocrit : 31.1 %  Platelet Count - Automated : 199 K/uL  Mean Cell Volume : 90.7 fl  Mean Cell Hemoglobin : 28.0 pg  Mean Cell Hemoglobin Concentration : 30.9 g/dL  Auto Neutrophil # : x  Auto Lymphocyte # : x  Auto Monocyte # : x  Auto Eosinophil # : x  Auto Basophil # : x  Auto Neutrophil % : x  Auto Lymphocyte % : x  Auto Monocyte % : x  Auto Eosinophil % : x  Auto Basophil % : x    12-10    142  |  107  |  7   ----------------------------<  93  3.9   |  22  |  0.89    Ca    8.5      10 Dec 2024 04:58  Phos  4.2     12-10  Mg     2.0     12-10        Assessment:  1. High Risk PE provoked in the setting of pelvic and cervical fractures, immobility which happened on therapeutic a/c; likely that large burden proximal DVT embolized to the lungs, causing decompensation  2. s/p half dose tPA x2, extubated and weaned off pressors    Plan:  - Transitioned from hep gtt to apixaban 10mg BID loading dose followed by 5mg BID. APLS labs negative.  - Troponin down-trended. Lactate cleared.  - CT A/P on 11/25 w/ patent L common iliac vein, thrombosis of external iliac, common femoral, and superficial femoral veins. No evidence of malignancy seen.  - Will need dedicated CT venogram of A/P to better evaluate venous system but can be performed as outpatient  - As outpatient age appropriate malignancy screening.   - Patient clinically improved. Continue to monitor on telemetry. Ultimate DC to acute rehab  - Appreciate excellent care by Medicine and Surgery.       Mayur Hale MD  Cardiology Fellow, PGY-6    Please call with any questions:   DIRECT SERVICE NUMBER: [phone] / Available on TEAMS
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
## 2024-12-11 NOTE — PROGRESS NOTE ADULT - PROBLEM SELECTOR PLAN 1
#Acute RVF in setting of massive Pulmonary Embolism  #LLE DVT  CTAP on 11/25 demonstrated deep vein thrombosis involving the left external iliac, common femoral   and superficial femoral veins as well as partially visualized lobar and segmental pulmonary thromboembolism  CTAPE on 12/4 showed Intraluminal filling defects in right main, right interlobar artery, and   lobar/segmental branches of pulmonary artery in all 5 lobes in keeping with pulmonary embolism. Consolidative opacities in the bilateral lower lobes, may represent atelectasis, pulmonary infarct, or a combination of both.    - f/u hypercoagulable workup due to PE occurrence while on therapeutic lovenox  - Continue eliquis #Acute RVF in setting of massive Pulmonary Embolism  #LLE DVT  CTAP on 11/25 demonstrated deep vein thrombosis involving the left external iliac, common femoral   and superficial femoral veins as well as partially visualized lobar and segmental pulmonary thromboembolism  CTAPE on 12/4 showed Intraluminal filling defects in right main, right interlobar artery, and   lobar/segmental branches of pulmonary artery in all 5 lobes in keeping with pulmonary embolism. Consolidative opacities in the bilateral lower lobes, may represent atelectasis, pulmonary infarct, or a combination of both.    - Continue eliquis

## 2024-12-11 NOTE — PROGRESS NOTE ADULT - SUBJECTIVE AND OBJECTIVE BOX
Tayler Chahal MD  EM/IM PGY-1  TEAMS  -----------------------------------------    ================== UNFINISHED NOTE =======================    INTERVAL HPI/OVERNIGHT EVENTS:    SUBJECTIVE: Patient seen and examined at bedside.     VITAL SIGNS:  T(C): 37.1 (12-11-24 @ 04:23), Max: 37.2 (12-10-24 @ 16:24)  HR: 78 (12-11-24 @ 04:23) (77 - 103)  BP: 131/72 (12-11-24 @ 04:23) (103/72 - 131/72)  RR: 18 (12-11-24 @ 04:23) (17 - 18)  SpO2: 94% (12-11-24 @ 04:23) (94% - 97%)    PHYSICAL EXAM:        MEDICATIONS:  MEDICATIONS  (STANDING):  acetaminophen     Tablet .. 975 milliGRAM(s) Oral every 8 hours  apixaban 10 milliGRAM(s) Oral every 12 hours  chlorhexidine 2% Cloths 1 Application(s) Topical <User Schedule>  chlorhexidine 4% Liquid 1 Application(s) Topical <User Schedule>  famotidine    Tablet 20 milliGRAM(s) Oral daily  melatonin 1 milliGRAM(s) Oral at bedtime  piperacillin/tazobactam IVPB.. 3.375 Gram(s) IV Intermittent every 8 hours  polyethylene glycol 3350 17 Gram(s) Oral daily  senna 2 Tablet(s) Oral at bedtime  tamsulosin 0.4 milliGRAM(s) Oral at bedtime  vancomycin  IVPB 1250 milliGRAM(s) IV Intermittent every 12 hours    MEDICATIONS  (PRN):  benzocaine/menthol Lozenge 1 Lozenge Oral every 8 hours PRN Sore Throat  lidocaine   4% Patch 1 Patch Transdermal every 24 hours PRN pain  oxyCODONE    IR 5 milliGRAM(s) Oral every 6 hours PRN Moderate Pain (4 - 6)  oxyCODONE    IR 10 milliGRAM(s) Oral every 6 hours PRN Severe Pain (7 - 10)  simethicone 80 milliGRAM(s) Chew four times a day PRN Upset Stomach      LABS:                        9.6    8.55  )-----------( 199      ( 10 Dec 2024 04:58 )             31.1     12-10    142  |  107  |  7   ----------------------------<  93  3.9   |  22  |  0.89    Ca    8.5      10 Dec 2024 04:58  Phos  4.2     12-10  Mg     2.0     12-10      PTT - ( 10 Dec 2024 04:58 )  PTT:33.5 sec  Urinalysis Basic - ( 10 Dec 2024 04:58 )    Color: x / Appearance: x / SG: x / pH: x  Gluc: 93 mg/dL / Ketone: x  / Bili: x / Urobili: x   Blood: x / Protein: x / Nitrite: x   Leuk Esterase: x / RBC: x / WBC x   Sq Epi: x / Non Sq Epi: x / Bacteria: x         Tayler Chahal MD  EM/IM PGY-1  TEAMS  -----------------------------------------    INTERVAL HPI/OVERNIGHT EVENTS: No acute overnight events, pending rehab placement.    SUBJECTIVE: Patient seen and examined at bedside. No acute complaints including chest pain, difficulty breathing.     VITAL SIGNS:  T(C): 37.1 (12-11-24 @ 04:23), Max: 37.2 (12-10-24 @ 16:24)  HR: 78 (12-11-24 @ 04:23) (77 - 103)  BP: 131/72 (12-11-24 @ 04:23) (103/72 - 131/72)  RR: 18 (12-11-24 @ 04:23) (17 - 18)  SpO2: 94% (12-11-24 @ 04:23) (94% - 97%)    PHYSICAL EXAM:  General: WN/WD NAD, sitting in bed eating breakfast  Head: Atraumatic  Eyes: EOM grossly intact, no scleral icterus  ENT: moist mucous membranes  Neurology: A&Ox3, nonfocal, CABA x 4  Respiratory: normal respiratory effort; CTAB  CV: Extremities warm and well perfused; normal S1/S2, no appreciable murmurs  Abdominal: Soft, nondistended; nontender  Extremities: wound vac in place to right lower extremity      MEDICATIONS:  MEDICATIONS  (STANDING):  acetaminophen     Tablet .. 975 milliGRAM(s) Oral every 8 hours  apixaban 10 milliGRAM(s) Oral every 12 hours  chlorhexidine 2% Cloths 1 Application(s) Topical <User Schedule>  chlorhexidine 4% Liquid 1 Application(s) Topical <User Schedule>  famotidine    Tablet 20 milliGRAM(s) Oral daily  melatonin 1 milliGRAM(s) Oral at bedtime  piperacillin/tazobactam IVPB.. 3.375 Gram(s) IV Intermittent every 8 hours  polyethylene glycol 3350 17 Gram(s) Oral daily  senna 2 Tablet(s) Oral at bedtime  tamsulosin 0.4 milliGRAM(s) Oral at bedtime  vancomycin  IVPB 1250 milliGRAM(s) IV Intermittent every 12 hours    MEDICATIONS  (PRN):  benzocaine/menthol Lozenge 1 Lozenge Oral every 8 hours PRN Sore Throat  lidocaine   4% Patch 1 Patch Transdermal every 24 hours PRN pain  oxyCODONE    IR 5 milliGRAM(s) Oral every 6 hours PRN Moderate Pain (4 - 6)  oxyCODONE    IR 10 milliGRAM(s) Oral every 6 hours PRN Severe Pain (7 - 10)  simethicone 80 milliGRAM(s) Chew four times a day PRN Upset Stomach      LABS:                        9.6    8.55  )-----------( 199      ( 10 Dec 2024 04:58 )             31.1     12-10    142  |  107  |  7   ----------------------------<  93  3.9   |  22  |  0.89    Ca    8.5      10 Dec 2024 04:58  Phos  4.2     12-10  Mg     2.0     12-10      PTT - ( 10 Dec 2024 04:58 )  PTT:33.5 sec  Urinalysis Basic - ( 10 Dec 2024 04:58 )    Color: x / Appearance: x / SG: x / pH: x  Gluc: 93 mg/dL / Ketone: x  / Bili: x / Urobili: x   Blood: x / Protein: x / Nitrite: x   Leuk Esterase: x / RBC: x / WBC x   Sq Epi: x / Non Sq Epi: x / Bacteria: x

## 2024-12-12 LAB
DNA PLOIDY SPEC FC-IMP: SIGNIFICANT CHANGE UP
PTR INTERPRETATION: SIGNIFICANT CHANGE UP

## 2024-12-12 PROCEDURE — 99232 SBSQ HOSP IP/OBS MODERATE 35: CPT | Mod: GC

## 2024-12-12 RX ORDER — POLYETHYLENE GLYCOL 3350 17 G/17G
17 POWDER, FOR SOLUTION ORAL
Qty: 0 | Refills: 0 | DISCHARGE
Start: 2024-12-12

## 2024-12-12 RX ORDER — FAMOTIDINE 20 MG/1
1 TABLET, FILM COATED ORAL
Qty: 0 | Refills: 0 | DISCHARGE
Start: 2024-12-12

## 2024-12-12 RX ORDER — SENNOSIDES 8.6 MG
2 TABLET ORAL
Qty: 0 | Refills: 0 | DISCHARGE
Start: 2024-12-12

## 2024-12-12 RX ORDER — LIDOCAINE 40 MG/G
1 CREAM TOPICAL
Qty: 0 | Refills: 0 | DISCHARGE
Start: 2024-12-12

## 2024-12-12 RX ORDER — OXYCODONE HYDROCHLORIDE 30 MG/1
5 TABLET ORAL EVERY 6 HOURS
Refills: 0 | Status: DISCONTINUED | OUTPATIENT
Start: 2024-12-12 | End: 2024-12-13

## 2024-12-12 RX ORDER — TAMSULOSIN HYDROCHLORIDE 0.4 MG/1
1 CAPSULE ORAL
Qty: 0 | Refills: 0 | DISCHARGE
Start: 2024-12-12

## 2024-12-12 RX ORDER — SIMETHICONE 125 MG
1 CAPSULE ORAL
Qty: 0 | Refills: 0 | DISCHARGE
Start: 2024-12-12

## 2024-12-12 RX ORDER — OXYCODONE HYDROCHLORIDE 30 MG/1
10 TABLET ORAL EVERY 6 HOURS
Refills: 0 | Status: DISCONTINUED | OUTPATIENT
Start: 2024-12-12 | End: 2024-12-13

## 2024-12-12 RX ADMIN — ACETAMINOPHEN 500MG 975 MILLIGRAM(S): 500 TABLET, COATED ORAL at 14:00

## 2024-12-12 RX ADMIN — POLYETHYLENE GLYCOL 3350 17 GRAM(S): 17 POWDER, FOR SOLUTION ORAL at 10:20

## 2024-12-12 RX ADMIN — ACETAMINOPHEN 500MG 975 MILLIGRAM(S): 500 TABLET, COATED ORAL at 13:18

## 2024-12-12 RX ADMIN — ACETAMINOPHEN 500MG 975 MILLIGRAM(S): 500 TABLET, COATED ORAL at 06:11

## 2024-12-12 RX ADMIN — CHLORHEXIDINE GLUCONATE 1 APPLICATION(S): 1.2 RINSE ORAL at 05:10

## 2024-12-12 RX ADMIN — ACETAMINOPHEN 500MG 975 MILLIGRAM(S): 500 TABLET, COATED ORAL at 05:11

## 2024-12-12 RX ADMIN — ACETAMINOPHEN 500MG 975 MILLIGRAM(S): 500 TABLET, COATED ORAL at 22:30

## 2024-12-12 RX ADMIN — APIXABAN 10 MILLIGRAM(S): 2.5 TABLET, FILM COATED ORAL at 05:10

## 2024-12-12 RX ADMIN — ACETAMINOPHEN 500MG 975 MILLIGRAM(S): 500 TABLET, COATED ORAL at 21:32

## 2024-12-12 RX ADMIN — APIXABAN 10 MILLIGRAM(S): 2.5 TABLET, FILM COATED ORAL at 17:45

## 2024-12-12 RX ADMIN — FAMOTIDINE 20 MILLIGRAM(S): 20 TABLET, FILM COATED ORAL at 10:21
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
## 2024-12-12 NOTE — PROGRESS NOTE ADULT - PROBLEM SELECTOR PLAN 6
history of hypertension  Patient on amlodipine 5 mg daily, lisinopril 20 mg daily, valsartan 160 mg daily    - hold home meds   - ct blood pressure  - consider starting valsartan 160 mg daily if bp continues to trend elevated

## 2024-12-12 NOTE — PROGRESS NOTE ADULT - PROBLEM SELECTOR PLAN 1
Presented initially iso R heart strain 2/2 massive PE s/p tPA and heparin gtt. Likely provoked iso immobility.   - transitioned to eliquis with load 10 BID followed by maintenance  - will need OP CTAP venogram to assess venous system  - hypercoag workup: silica clotting time negative, DVVT negative. factor V leidin and prothrombin gene pending

## 2024-12-12 NOTE — PROGRESS NOTE ADULT - PROBLEM SELECTOR PLAN 2
#Acute RVF in setting of massive Pulmonary Embolism  #LLE DVT  CTAP on 11/25 demonstrated deep vein thrombosis involving the left external iliac, common femoral   and superficial femoral veins as well as partially visualized lobar and segmental pulmonary thromboembolism  CTAPE on 12/4 showed Intraluminal filling defects in right main, right interlobar artery, and   lobar/segmental branches of pulmonary artery in all 5 lobes in keeping with pulmonary embolism. Consolidative opacities in the bilateral lower lobes, may represent atelectasis, pulmonary infarct, or a combination of both.    - Continue eliquis

## 2024-12-12 NOTE — PROGRESS NOTE ADULT - SUBJECTIVE AND OBJECTIVE BOX
Patient is a 69y old  Male who presents with a chief complaint of PE (11 Dec 2024 12:01)      SUBJECTIVE / OVERNIGHT EVENTS:  Patient seen and evaluated at bedside.  No events overnight. Ongoing discharge planning.     Vital Signs Last 24 Hrs  T(C): 36.9 (12 Dec 2024 04:00), Max: 36.9 (11 Dec 2024 12:00)  T(F): 98.5 (12 Dec 2024 04:00), Max: 98.5 (12 Dec 2024 04:00)  HR: 74 (12 Dec 2024 04:00) (72 - 84)  BP: 145/82 (12 Dec 2024 04:00) (118/71 - 153/77)  BP(mean): --  RR: 18 (12 Dec 2024 04:00) (17 - 18)  SpO2: 95% (12 Dec 2024 04:00) (92% - 96%)    Parameters below as of 12 Dec 2024 04:00  Patient On (Oxygen Delivery Method): room air        PHYSICAL EXAM:  GENERAL: NAD, well-developed  CHEST/LUNG: Clear to auscultation bilaterally; No wheeze  HEART: Regular rate and rhythm; Normal S1 S2, No murmurs, rubs, or gallops  ABDOMEN: Soft, Nontender, Nondistended; Bowel sounds present  EXTREMITIES:  2+ Peripheral Pulses, No clubbing, cyanosis, or edema. wound vac in place to right lower extremity  PSYCH: AAOx3    LABS:    Hgb Trend: 9.6<--, 9.5<--, 9.4<--, 9.4<--, 8.1<--        Creatinine Trend: 0.89<--, 0.73<--, 0.67<--, 0.63<--, 0.64<--, 0.64<--
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
## 2024-12-13 ENCOUNTER — INPATIENT (INPATIENT)
Facility: HOSPITAL | Age: 69
LOS: 3 days | Discharge: HOME | DRG: 299 | End: 2024-12-17
Attending: STUDENT IN AN ORGANIZED HEALTH CARE EDUCATION/TRAINING PROGRAM | Admitting: INTERNAL MEDICINE
Payer: MEDICAID

## 2024-12-13 VITALS
OXYGEN SATURATION: 94 % | TEMPERATURE: 98 F | RESPIRATION RATE: 18 BRPM | DIASTOLIC BLOOD PRESSURE: 77 MMHG | HEART RATE: 76 BPM | SYSTOLIC BLOOD PRESSURE: 147 MMHG

## 2024-12-13 VITALS
TEMPERATURE: 98 F | OXYGEN SATURATION: 96 % | SYSTOLIC BLOOD PRESSURE: 147 MMHG | RESPIRATION RATE: 18 BRPM | HEART RATE: 75 BPM | DIASTOLIC BLOOD PRESSURE: 77 MMHG

## 2024-12-13 DIAGNOSIS — Z87.81 PERSONAL HISTORY OF (HEALED) TRAUMATIC FRACTURE: Chronic | ICD-10-CM

## 2024-12-13 DIAGNOSIS — I26.99 OTHER PULMONARY EMBOLISM WITHOUT ACUTE COR PULMONALE: ICD-10-CM

## 2024-12-13 DIAGNOSIS — I26.9 PULMONARY EMBOLISM WITHOUT ACUTE COR PULMONALE: ICD-10-CM

## 2024-12-13 PROCEDURE — 85027 COMPLETE CBC AUTOMATED: CPT

## 2024-12-13 PROCEDURE — 84439 ASSAY OF FREE THYROXINE: CPT

## 2024-12-13 PROCEDURE — 81001 URINALYSIS AUTO W/SCOPE: CPT

## 2024-12-13 PROCEDURE — 85613 RUSSELL VIPER VENOM DILUTED: CPT

## 2024-12-13 PROCEDURE — 74018 RADEX ABDOMEN 1 VIEW: CPT

## 2024-12-13 PROCEDURE — 86850 RBC ANTIBODY SCREEN: CPT

## 2024-12-13 PROCEDURE — 85610 PROTHROMBIN TIME: CPT

## 2024-12-13 PROCEDURE — 83735 ASSAY OF MAGNESIUM: CPT

## 2024-12-13 PROCEDURE — 85520 HEPARIN ASSAY: CPT

## 2024-12-13 PROCEDURE — 36415 COLL VENOUS BLD VENIPUNCTURE: CPT

## 2024-12-13 PROCEDURE — 83880 ASSAY OF NATRIURETIC PEPTIDE: CPT

## 2024-12-13 PROCEDURE — 97605 NEG PRS WND THER DME<=50SQCM: CPT

## 2024-12-13 PROCEDURE — 80202 ASSAY OF VANCOMYCIN: CPT

## 2024-12-13 PROCEDURE — 87205 SMEAR GRAM STAIN: CPT

## 2024-12-13 PROCEDURE — 71275 CT ANGIOGRAPHY CHEST: CPT | Mod: MC

## 2024-12-13 PROCEDURE — 97161 PT EVAL LOW COMPLEX 20 MIN: CPT

## 2024-12-13 PROCEDURE — 82962 GLUCOSE BLOOD TEST: CPT

## 2024-12-13 PROCEDURE — 80053 COMPREHEN METABOLIC PANEL: CPT

## 2024-12-13 PROCEDURE — 82330 ASSAY OF CALCIUM: CPT

## 2024-12-13 PROCEDURE — 97162 PT EVAL MOD COMPLEX 30 MIN: CPT

## 2024-12-13 PROCEDURE — 84443 ASSAY THYROID STIM HORMONE: CPT

## 2024-12-13 PROCEDURE — 80048 BASIC METABOLIC PNL TOTAL CA: CPT

## 2024-12-13 PROCEDURE — 85018 HEMOGLOBIN: CPT

## 2024-12-13 PROCEDURE — 84481 FREE ASSAY (FT-3): CPT

## 2024-12-13 PROCEDURE — 94002 VENT MGMT INPAT INIT DAY: CPT

## 2024-12-13 PROCEDURE — 86146 BETA-2 GLYCOPROTEIN ANTIBODY: CPT

## 2024-12-13 PROCEDURE — 94003 VENT MGMT INPAT SUBQ DAY: CPT

## 2024-12-13 PROCEDURE — 84484 ASSAY OF TROPONIN QUANT: CPT

## 2024-12-13 PROCEDURE — 85730 THROMBOPLASTIN TIME PARTIAL: CPT

## 2024-12-13 PROCEDURE — 99223 1ST HOSP IP/OBS HIGH 75: CPT

## 2024-12-13 PROCEDURE — 81241 F5 GENE: CPT

## 2024-12-13 PROCEDURE — 86900 BLOOD TYPING SEROLOGIC ABO: CPT

## 2024-12-13 PROCEDURE — 86038 ANTINUCLEAR ANTIBODIES: CPT

## 2024-12-13 PROCEDURE — 86901 BLOOD TYPING SEROLOGIC RH(D): CPT

## 2024-12-13 PROCEDURE — 84100 ASSAY OF PHOSPHORUS: CPT

## 2024-12-13 PROCEDURE — 83036 HEMOGLOBIN GLYCOSYLATED A1C: CPT

## 2024-12-13 PROCEDURE — 84480 ASSAY TRIIODOTHYRONINE (T3): CPT

## 2024-12-13 PROCEDURE — 80061 LIPID PANEL: CPT

## 2024-12-13 PROCEDURE — 82435 ASSAY OF BLOOD CHLORIDE: CPT

## 2024-12-13 PROCEDURE — 87637 SARSCOV2&INF A&B&RSV AMP PRB: CPT

## 2024-12-13 PROCEDURE — 84436 ASSAY OF TOTAL THYROXINE: CPT

## 2024-12-13 PROCEDURE — 97606 NEG PRS WND THER DME>50 SQCM: CPT

## 2024-12-13 PROCEDURE — 97530 THERAPEUTIC ACTIVITIES: CPT

## 2024-12-13 PROCEDURE — 94799 UNLISTED PULMONARY SVC/PX: CPT

## 2024-12-13 PROCEDURE — 87040 BLOOD CULTURE FOR BACTERIA: CPT

## 2024-12-13 PROCEDURE — 93005 ELECTROCARDIOGRAM TRACING: CPT

## 2024-12-13 PROCEDURE — 82010 KETONE BODYS QUAN: CPT

## 2024-12-13 PROCEDURE — 99239 HOSP IP/OBS DSCHRG MGMT >30: CPT | Mod: GC

## 2024-12-13 PROCEDURE — 86147 CARDIOLIPIN ANTIBODY EA IG: CPT

## 2024-12-13 PROCEDURE — 85025 COMPLETE CBC W/AUTO DIFF WBC: CPT

## 2024-12-13 PROCEDURE — 81240 F2 GENE: CPT

## 2024-12-13 PROCEDURE — 83605 ASSAY OF LACTIC ACID: CPT

## 2024-12-13 PROCEDURE — 82803 BLOOD GASES ANY COMBINATION: CPT

## 2024-12-13 PROCEDURE — 71045 X-RAY EXAM CHEST 1 VIEW: CPT

## 2024-12-13 PROCEDURE — 85014 HEMATOCRIT: CPT

## 2024-12-13 PROCEDURE — 70450 CT HEAD/BRAIN W/O DYE: CPT | Mod: MC

## 2024-12-13 PROCEDURE — 84145 PROCALCITONIN (PCT): CPT

## 2024-12-13 PROCEDURE — 84295 ASSAY OF SERUM SODIUM: CPT

## 2024-12-13 PROCEDURE — 82947 ASSAY GLUCOSE BLOOD QUANT: CPT

## 2024-12-13 PROCEDURE — 97116 GAIT TRAINING THERAPY: CPT

## 2024-12-13 PROCEDURE — 82550 ASSAY OF CK (CPK): CPT

## 2024-12-13 PROCEDURE — 84132 ASSAY OF SERUM POTASSIUM: CPT

## 2024-12-13 PROCEDURE — 87641 MR-STAPH DNA AMP PROBE: CPT

## 2024-12-13 PROCEDURE — 87640 STAPH A DNA AMP PROBE: CPT

## 2024-12-13 PROCEDURE — 97166 OT EVAL MOD COMPLEX 45 MIN: CPT

## 2024-12-13 PROCEDURE — 87070 CULTURE OTHR SPECIMN AEROBIC: CPT

## 2024-12-13 PROCEDURE — 86225 DNA ANTIBODY NATIVE: CPT

## 2024-12-13 RX ORDER — AMLODIPINE BESYLATE 10 MG/1
1 TABLET ORAL
Refills: 0 | DISCHARGE

## 2024-12-13 RX ORDER — SENNOSIDES 8.6 MG
2 TABLET ORAL AT BEDTIME
Refills: 0 | Status: DISCONTINUED | OUTPATIENT
Start: 2024-12-13 | End: 2024-12-17

## 2024-12-13 RX ORDER — OXYCODONE HYDROCHLORIDE 30 MG/1
10 TABLET ORAL EVERY 4 HOURS
Refills: 0 | Status: DISCONTINUED | OUTPATIENT
Start: 2024-12-13 | End: 2024-12-17

## 2024-12-13 RX ORDER — SIMETHICONE 125 MG
80 CAPSULE ORAL
Refills: 0 | Status: DISCONTINUED | OUTPATIENT
Start: 2024-12-13 | End: 2024-12-17

## 2024-12-13 RX ORDER — VALSARTAN 320 MG/1
80 TABLET ORAL DAILY
Refills: 0 | Status: DISCONTINUED | OUTPATIENT
Start: 2024-12-14 | End: 2024-12-17

## 2024-12-13 RX ORDER — APIXABAN 2.5 MG/1
10 TABLET, FILM COATED ORAL EVERY 12 HOURS
Refills: 0 | Status: COMPLETED | OUTPATIENT
Start: 2024-12-13 | End: 2024-12-15

## 2024-12-13 RX ORDER — LISINOPRIL 20 MG/1
1 TABLET ORAL
Refills: 0 | DISCHARGE

## 2024-12-13 RX ORDER — MAGNESIUM, ALUMINUM HYDROXIDE 200-225/5
30 SUSPENSION, ORAL (FINAL DOSE FORM) ORAL EVERY 4 HOURS
Refills: 0 | Status: DISCONTINUED | OUTPATIENT
Start: 2024-12-13 | End: 2024-12-17

## 2024-12-13 RX ORDER — VALSARTAN 320 MG/1
1 TABLET ORAL
Qty: 0 | Refills: 0 | DISCHARGE
Start: 2024-12-13

## 2024-12-13 RX ORDER — FAMOTIDINE 20 MG/1
20 TABLET, FILM COATED ORAL DAILY
Refills: 0 | Status: DISCONTINUED | OUTPATIENT
Start: 2024-12-13 | End: 2024-12-17

## 2024-12-13 RX ORDER — ONDANSETRON HYDROCHLORIDE 4 MG/1
4 TABLET, FILM COATED ORAL EVERY 8 HOURS
Refills: 0 | Status: DISCONTINUED | OUTPATIENT
Start: 2024-12-13 | End: 2024-12-17

## 2024-12-13 RX ORDER — VALSARTAN 320 MG/1
80 TABLET ORAL DAILY
Refills: 0 | Status: DISCONTINUED | OUTPATIENT
Start: 2024-12-13 | End: 2024-12-13

## 2024-12-13 RX ORDER — VALSARTAN 320 MG/1
1 TABLET ORAL
Refills: 0 | DISCHARGE

## 2024-12-13 RX ORDER — POLYETHYLENE GLYCOL 3350 17 G/17G
17 POWDER, FOR SOLUTION ORAL DAILY
Refills: 0 | Status: DISCONTINUED | OUTPATIENT
Start: 2024-12-13 | End: 2024-12-17

## 2024-12-13 RX ORDER — NALOXONE HCL 0.4 MG/ML
0.4 AMPUL (ML) INJECTION ONCE
Refills: 0 | Status: DISCONTINUED | OUTPATIENT
Start: 2024-12-13 | End: 2024-12-17

## 2024-12-13 RX ORDER — MONTELUKAST SODIUM 10 MG/1
10 TABLET ORAL DAILY
Refills: 0 | Status: DISCONTINUED | OUTPATIENT
Start: 2024-12-13 | End: 2024-12-17

## 2024-12-13 RX ORDER — APIXABAN 2.5 MG/1
5 TABLET, FILM COATED ORAL EVERY 12 HOURS
Refills: 0 | Status: DISCONTINUED | OUTPATIENT
Start: 2024-12-16 | End: 2024-12-17

## 2024-12-13 RX ORDER — TAMSULOSIN HYDROCHLORIDE 0.4 MG/1
0.4 CAPSULE ORAL AT BEDTIME
Refills: 0 | Status: DISCONTINUED | OUTPATIENT
Start: 2024-12-13 | End: 2024-12-17

## 2024-12-13 RX ORDER — APIXABAN 2.5 MG/1
2 TABLET, FILM COATED ORAL
Qty: 0 | Refills: 0 | DISCHARGE
Start: 2024-12-13 | End: 2024-12-15

## 2024-12-13 RX ORDER — ACETAMINOPHEN, DIPHENHYDRAMINE HCL, PHENYLEPHRINE HCL 325; 25; 5 MG/1; MG/1; MG/1
3 TABLET ORAL AT BEDTIME
Refills: 0 | Status: DISCONTINUED | OUTPATIENT
Start: 2024-12-13 | End: 2024-12-17

## 2024-12-13 RX ORDER — OXYCODONE HYDROCHLORIDE 30 MG/1
5 TABLET ORAL EVERY 4 HOURS
Refills: 0 | Status: DISCONTINUED | OUTPATIENT
Start: 2024-12-13 | End: 2024-12-17

## 2024-12-13 RX ORDER — LIDOCAINE 40 MG/G
1 CREAM TOPICAL DAILY
Refills: 0 | Status: DISCONTINUED | OUTPATIENT
Start: 2024-12-13 | End: 2024-12-17

## 2024-12-13 RX ORDER — ACETAMINOPHEN 500MG 500 MG/1
650 TABLET, COATED ORAL EVERY 6 HOURS
Refills: 0 | Status: DISCONTINUED | OUTPATIENT
Start: 2024-12-13 | End: 2024-12-17

## 2024-12-13 RX ADMIN — FAMOTIDINE 20 MILLIGRAM(S): 20 TABLET, FILM COATED ORAL at 12:19

## 2024-12-13 RX ADMIN — ACETAMINOPHEN 500MG 975 MILLIGRAM(S): 500 TABLET, COATED ORAL at 06:05

## 2024-12-13 RX ADMIN — APIXABAN 10 MILLIGRAM(S): 2.5 TABLET, FILM COATED ORAL at 05:05

## 2024-12-13 RX ADMIN — ACETAMINOPHEN 500MG 975 MILLIGRAM(S): 500 TABLET, COATED ORAL at 14:17

## 2024-12-13 RX ADMIN — CHLORHEXIDINE GLUCONATE 1 APPLICATION(S): 1.2 RINSE ORAL at 05:07

## 2024-12-13 RX ADMIN — ACETAMINOPHEN 500MG 975 MILLIGRAM(S): 500 TABLET, COATED ORAL at 15:17

## 2024-12-13 RX ADMIN — APIXABAN 10 MILLIGRAM(S): 2.5 TABLET, FILM COATED ORAL at 17:04

## 2024-12-13 RX ADMIN — VALSARTAN 80 MILLIGRAM(S): 320 TABLET ORAL at 17:04

## 2024-12-13 RX ADMIN — OXYCODONE HYDROCHLORIDE 10 MILLIGRAM(S): 30 TABLET ORAL at 13:18

## 2024-12-13 RX ADMIN — ACETAMINOPHEN 500MG 975 MILLIGRAM(S): 500 TABLET, COATED ORAL at 05:05

## 2024-12-13 RX ADMIN — OXYCODONE HYDROCHLORIDE 10 MILLIGRAM(S): 30 TABLET ORAL at 12:18

## 2024-12-13 NOTE — PROGRESS NOTE ADULT - PROBLEM SELECTOR PROBLEM 6
Hypertension
Need for prophylactic measure
Hypertension

## 2024-12-13 NOTE — DISCHARGE NOTE NURSING/CASE MANAGEMENT/SOCIAL WORK - PATIENT PORTAL LINK FT
You can access the FollowMyHealth Patient Portal offered by [facility] by registering at the following website: [email or web address]. By joining [employer]’s FollowMyHealth portal, you will also be able to view your health information using other applications (apps) compatible with our system.

## 2024-12-13 NOTE — DISCHARGE NOTE NURSING/CASE MANAGEMENT/SOCIAL WORK - NSDCFUADDAPPT_GEN_ALL_CORE_FT
APPTS ARE READY TO BE MADE: [] YES    Best Family or Patient Contact (if needed):    Additional Information about above appointments (if needed):    1: Dr. Roxie Diamond  2: [facility]  3:     Other comments or requests:

## 2024-12-13 NOTE — PROGRESS NOTE ADULT - ATTENDING SUPERVISION STATEMENT
Fellow
Fellow
Resident
Resident
Fellow
Resident
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
## 2024-12-13 NOTE — PROGRESS NOTE ADULT - SUBJECTIVE AND OBJECTIVE BOX
Tayler Chahal MD  EM/IM PGY-1  TEAMS  -----------------------------------------    ================== UNFINISHED NOTE =======================    INTERVAL HPI/OVERNIGHT EVENTS: No acute events overnight. Pending placement.    SUBJECTIVE: Patient seen and examined at bedside.     VITAL SIGNS:  T(C): 37.2 (12-13-24 @ 04:00), Max: 37.2 (12-12-24 @ 16:34)  HR: 67 (12-13-24 @ 04:00) (67 - 98)  BP: 149/83 (12-13-24 @ 04:00) (113/73 - 149/83)  RR: 18 (12-13-24 @ 04:00) (18 - 18)  SpO2: 96% (12-13-24 @ 04:00) (94% - 96%)    PHYSICAL EXAM:        MEDICATIONS:  MEDICATIONS  (STANDING):  acetaminophen     Tablet .. 975 milliGRAM(s) Oral every 8 hours  apixaban 10 milliGRAM(s) Oral every 12 hours  chlorhexidine 2% Cloths 1 Application(s) Topical <User Schedule>  chlorhexidine 4% Liquid 1 Application(s) Topical <User Schedule>  famotidine    Tablet 20 milliGRAM(s) Oral daily  melatonin 1 milliGRAM(s) Oral at bedtime  polyethylene glycol 3350 17 Gram(s) Oral daily  senna 2 Tablet(s) Oral at bedtime  tamsulosin 0.4 milliGRAM(s) Oral at bedtime    MEDICATIONS  (PRN):  lidocaine   4% Patch 1 Patch Transdermal every 24 hours PRN pain  oxyCODONE    IR 5 milliGRAM(s) Oral every 6 hours PRN Moderate Pain (4 - 6)  oxyCODONE    IR 10 milliGRAM(s) Oral every 6 hours PRN Severe Pain (7 - 10)  simethicone 80 milliGRAM(s) Chew four times a day PRN Upset Stomach      LABS:                 Tayler Chahal MD  EM/IM PGY-1  TEAMS  -----------------------------------------    INTERVAL HPI/OVERNIGHT EVENTS: No acute events overnight. Pending placement.    SUBJECTIVE: Patient seen and examined at bedside. Notes some mild back pain, otherwise no acute complaints. Last bowel movement this morning.     VITAL SIGNS:  T(C): 37.2 (12-13-24 @ 04:00), Max: 37.2 (12-12-24 @ 16:34)  HR: 67 (12-13-24 @ 04:00) (67 - 98)  BP: 149/83 (12-13-24 @ 04:00) (113/73 - 149/83)  RR: 18 (12-13-24 @ 04:00) (18 - 18)  SpO2: 96% (12-13-24 @ 04:00) (94% - 96%)    PHYSICAL EXAM:  GENERAL: NAD, well-developed  CHEST/LUNG: Clear to auscultation bilaterally; No wheeze  HEART: Regular rate and rhythm; Normal S1 S2, No murmurs, rubs, or gallops  ABDOMEN: Soft, Nontender, Nondistended; Bowel sounds present  EXTREMITIES:  2+ Peripheral Pulses, No clubbing, cyanosis, or edema. wound vac in place to right lower extremity  PSYCH: AAOx3      MEDICATIONS:  MEDICATIONS  (STANDING):  acetaminophen     Tablet .. 975 milliGRAM(s) Oral every 8 hours  apixaban 10 milliGRAM(s) Oral every 12 hours  chlorhexidine 2% Cloths 1 Application(s) Topical <User Schedule>  chlorhexidine 4% Liquid 1 Application(s) Topical <User Schedule>  famotidine    Tablet 20 milliGRAM(s) Oral daily  melatonin 1 milliGRAM(s) Oral at bedtime  polyethylene glycol 3350 17 Gram(s) Oral daily  senna 2 Tablet(s) Oral at bedtime  tamsulosin 0.4 milliGRAM(s) Oral at bedtime    MEDICATIONS  (PRN):  lidocaine   4% Patch 1 Patch Transdermal every 24 hours PRN pain  oxyCODONE    IR 5 milliGRAM(s) Oral every 6 hours PRN Moderate Pain (4 - 6)  oxyCODONE    IR 10 milliGRAM(s) Oral every 6 hours PRN Severe Pain (7 - 10)  simethicone 80 milliGRAM(s) Chew four times a day PRN Upset Stomach      LABS:

## 2024-12-13 NOTE — H&P ADULT - TIME BILLING
Labs/Imaging/notes reviewed. Prior hospitalization records reviewed. Med rec confirmed. Orders placed. Pt cosulted. SW consulted. Wound care consulted.
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
## 2024-12-13 NOTE — DISCHARGE NOTE NURSING/CASE MANAGEMENT/SOCIAL WORK - FINANCIAL ASSISTANCE
[facility] provides services at a reduced cost to those who are determined to be eligible through [facility]’s financial assistance program. Information regarding [facility]’s financial assistance program can be found by going to [email or web address] or by calling [phone].

## 2024-12-13 NOTE — PROGRESS NOTE ADULT - PROVIDER SPECIALTY LIST ADULT
MARLO
MARLO
Vascular Cardiology
MARLO
MARLO
Rehab Medicine
Surgery
Vascular Cardiology
MARLO
Surgery
Internal Medicine
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
## 2024-12-13 NOTE — PROGRESS NOTE ADULT - PROBLEM SELECTOR PLAN 7
Diet: Soft and bite sized after extubation, advance as tolerated  DVT ppx: covered with full AC eliquis  Dispo: acute rehab Diet: Regular  DVT ppx: covered with full AC eliquis  Dispo: acute rehab

## 2024-12-13 NOTE — H&P ADULT - NSHPPHYSICALEXAM_GEN_ALL_CORE
Vital Signs Last 24 Hrs  T(C): 36.8 (13 Dec 2024 20:38), Max: 37.2 (13 Dec 2024 04:00)  T(F): 98.2 (13 Dec 2024 20:38), Max: 98.9 (13 Dec 2024 04:00)  HR: 83 (13 Dec 2024 20:38) (67 - 95)  BP: 152/76 (13 Dec 2024 20:38) (126/81 - 165/86)  BP(mean): --  RR: 18 (13 Dec 2024 20:38) (18 - 18)  SpO2: 91% (13 Dec 2024 20:38) (91% - 96%)    Constitutional: NAD, VSS  Head: NC/AT  Eyes: PERRL, EOMI, anicteric sclera, conjunctiva WNL  ENT: Normal Pharynx, MMM, No tonsillar exudate/erythema  Neck: Supple, Non-tender  Chest: Non-tender, no rashes  Cardio: RRR, s1/s2, no appreciable murmurs/rubs/gallops  Resp: BS CTA bilaterally, no wheezing/rhonchi/rales  Abd: Soft, Non-tender, Non-distended, no rebound/guarding/rigidity  : not examined  Rectal: not examined  MSK: moving all extremities, no motor weakness, full ROM x4  Ext: palpable distal pulses, good capillary refill, +RLE Wound Vac in place  Psych: appropriate, cooperative  Neuro: CN II-XII grossly intact, no focal deficits  Skin: Warm/Dry. No rashes.
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
## 2024-12-13 NOTE — PROGRESS NOTE ADULT - PROBLEM SELECTOR PROBLEM 1
Acute right ventricular heart failure
Acute massive pulmonary embolism
Acute right ventricular heart failure
Acute massive pulmonary embolism
Acute right ventricular heart failure
Acute right ventricular heart failure

## 2024-12-13 NOTE — DISCHARGE NOTE NURSING/CASE MANAGEMENT/SOCIAL WORK - NSDCPEFALRISK_GEN_ALL_CORE
For information on Fall & Injury Prevention, visit: [email or web address] OR  [email or web address] OR  https://www.cdc.gov/steadi/patient.html

## 2024-12-13 NOTE — PROGRESS NOTE ADULT - TIME BILLING
time spent reviewing prior charts, meds, discussing plan with patient, family= 43 minutes. Time spent does not include time spent teaching
time spent reviewing prior charts, meds, discussing plan with patient, administrative team, PM&R= 46 minutes. Time spent does not include time spent teaching
- Reviewing, and interpreting labs and testing.  - Independently obtaining a review of systems and performing a physical exam  - Reviewing consultant documentation/recommendations in addition to discussing plan of care with consultants.  - Counselling and educating patient and family regarding interpretation of aforementioned items and plan of care.  -review of labs, imaging, notes, discussion of plan with team, which are independent of time spent teaching
time spent reviewing prior charts, meds, discussing plan with patient, family= 55 minutes. Time spent does not include time spent teaching
time spent reviewing prior charts, meds, discussing plan with patient, family, CM= 56 minutes. Time spent does not include time spent teaching

## 2024-12-13 NOTE — PROGRESS NOTE ADULT - PROBLEM SELECTOR PROBLEM 2
Acute right ventricular heart failure
Cellulitis
Acute right ventricular heart failure
Cellulitis
Cellulitis

## 2024-12-13 NOTE — PROGRESS NOTE ADULT - PROBLEM SELECTOR PLAN 1
Presented initially iso R heart strain 2/2 massive PE s/p tPA and heparin gtt. Likely provoked iso immobility.   - transitioned to eliquis with load 10 BID followed by maintenance  - follow up outpatient with CTAP venogram to assess venous system  - hypercoag workup: silica clotting time negative, DVVT negative. factor V leidin and prothrombin gene negative

## 2024-12-13 NOTE — DISCHARGE NOTE NURSING/CASE MANAGEMENT/SOCIAL WORK - NSDCPEELIQUISREACT_GEN_ALL_CORE
Breath sounds clear and equal bilaterally. Apixaban/Eliquis increases your risk for bleeding. Notify your doctor if you experience any of the following side effects: bleeding, coughing or vomiting blood, red or black stool, unexpected pain or swelling, itching or hives, chest pain, chest tightness, trouble breathing, changes in how much or how often you urinate, red or pink urine, numbness or tingling in your feet, or unusual muscle weakness. When Apixaban/Eliquis is taken with other medicines, they can affect how it works. Taking other medications such as aspirin, blood thinners, nonsteroidal anti-inflammatories, and medications that treat depression can increase your risk of bleeding. It is very important to tell your health care provider about all of the other medicines, including over-the-counter medications, herbs, and vitamins you are taking. DO NOT start, stop, or change the dosage of any medicine, including over-the-counter medicines, vitamins, and herbal products without your doctor’s approval. Any products containing aspirin or are nonsteroidal anti-inflammatories lessen the blood’s ability to form clots and add to the effect of Apixaban/Eliquis. Never take aspirin or medicines that contain aspirin without speaking to your doctor.
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
## 2024-12-13 NOTE — PROGRESS NOTE ADULT - PROBLEM SELECTOR PLAN 3
#RLE cellulitis with wound vac  s/p unasyn 12/4  s/p vancomycin 12/4-12/11  s/p zosyn 12/4-12/11    - PT following for wound care with wound vac

## 2024-12-14 LAB
ALBUMIN SERPL ELPH-MCNC: 3.1 G/DL — LOW (ref 3.3–5.2)
ALP SERPL-CCNC: 83 U/L — SIGNIFICANT CHANGE UP (ref 40–120)
ALT FLD-CCNC: 6 U/L — SIGNIFICANT CHANGE UP
ANION GAP SERPL CALC-SCNC: 12 MMOL/L — SIGNIFICANT CHANGE UP (ref 5–17)
AST SERPL-CCNC: 16 U/L — SIGNIFICANT CHANGE UP
BASOPHILS # BLD AUTO: 0.07 K/UL — SIGNIFICANT CHANGE UP (ref 0–0.2)
BASOPHILS NFR BLD AUTO: 0.7 % — SIGNIFICANT CHANGE UP (ref 0–2)
BILIRUB SERPL-MCNC: 0.6 MG/DL — SIGNIFICANT CHANGE UP (ref 0.4–2)
BUN SERPL-MCNC: 6.6 MG/DL — LOW (ref 8–20)
CALCIUM SERPL-MCNC: 8.7 MG/DL — SIGNIFICANT CHANGE UP (ref 8.4–10.5)
CHLORIDE SERPL-SCNC: 108 MMOL/L — SIGNIFICANT CHANGE UP (ref 96–108)
CO2 SERPL-SCNC: 25 MMOL/L — SIGNIFICANT CHANGE UP (ref 22–29)
CREAT SERPL-MCNC: 1.2 MG/DL — SIGNIFICANT CHANGE UP (ref 0.5–1.3)
EGFR: 66 ML/MIN/1.73M2 — SIGNIFICANT CHANGE UP
EOSINOPHIL # BLD AUTO: 0.28 K/UL — SIGNIFICANT CHANGE UP (ref 0–0.5)
EOSINOPHIL NFR BLD AUTO: 2.7 % — SIGNIFICANT CHANGE UP (ref 0–6)
GLUCOSE SERPL-MCNC: 110 MG/DL — HIGH (ref 70–99)
HCT VFR BLD CALC: 31.2 % — LOW (ref 39–50)
HGB BLD-MCNC: 9.7 G/DL — LOW (ref 13–17)
IMM GRANULOCYTES NFR BLD AUTO: 1.1 % — HIGH (ref 0–0.9)
LYMPHOCYTES # BLD AUTO: 2.09 K/UL — SIGNIFICANT CHANGE UP (ref 1–3.3)
LYMPHOCYTES # BLD AUTO: 20.4 % — SIGNIFICANT CHANGE UP (ref 13–44)
MCHC RBC-ENTMCNC: 27.6 PG — SIGNIFICANT CHANGE UP (ref 27–34)
MCHC RBC-ENTMCNC: 31.1 G/DL — LOW (ref 32–36)
MCV RBC AUTO: 88.6 FL — SIGNIFICANT CHANGE UP (ref 80–100)
MONOCYTES # BLD AUTO: 0.96 K/UL — HIGH (ref 0–0.9)
MONOCYTES NFR BLD AUTO: 9.3 % — SIGNIFICANT CHANGE UP (ref 2–14)
NEUTROPHILS # BLD AUTO: 6.76 K/UL — SIGNIFICANT CHANGE UP (ref 1.8–7.4)
NEUTROPHILS NFR BLD AUTO: 65.8 % — SIGNIFICANT CHANGE UP (ref 43–77)
PLATELET # BLD AUTO: 555 K/UL — HIGH (ref 150–400)
POTASSIUM SERPL-MCNC: 3 MMOL/L — LOW (ref 3.5–5.3)
POTASSIUM SERPL-SCNC: 3 MMOL/L — LOW (ref 3.5–5.3)
PROT SERPL-MCNC: 6.2 G/DL — LOW (ref 6.6–8.7)
RBC # BLD: 3.52 M/UL — LOW (ref 4.2–5.8)
RBC # FLD: 14.9 % — HIGH (ref 10.3–14.5)
SODIUM SERPL-SCNC: 145 MMOL/L — SIGNIFICANT CHANGE UP (ref 135–145)
WBC # BLD: 10.27 K/UL — SIGNIFICANT CHANGE UP (ref 3.8–10.5)
WBC # FLD AUTO: 10.27 K/UL — SIGNIFICANT CHANGE UP (ref 3.8–10.5)

## 2024-12-14 PROCEDURE — 99232 SBSQ HOSP IP/OBS MODERATE 35: CPT

## 2024-12-14 RX ORDER — POTASSIUM CHLORIDE 600 MG/1
40 TABLET, EXTENDED RELEASE ORAL EVERY 4 HOURS
Refills: 0 | Status: COMPLETED | OUTPATIENT
Start: 2024-12-14 | End: 2024-12-14

## 2024-12-14 RX ADMIN — VALSARTAN 80 MILLIGRAM(S): 320 TABLET ORAL at 06:00

## 2024-12-14 RX ADMIN — APIXABAN 10 MILLIGRAM(S): 2.5 TABLET, FILM COATED ORAL at 17:47

## 2024-12-14 RX ADMIN — Medication 2 TABLET(S): at 00:34

## 2024-12-14 RX ADMIN — POTASSIUM CHLORIDE 40 MILLIEQUIVALENT(S): 600 TABLET, EXTENDED RELEASE ORAL at 17:47

## 2024-12-14 RX ADMIN — POTASSIUM CHLORIDE 40 MILLIEQUIVALENT(S): 600 TABLET, EXTENDED RELEASE ORAL at 21:26

## 2024-12-14 RX ADMIN — APIXABAN 10 MILLIGRAM(S): 2.5 TABLET, FILM COATED ORAL at 05:59

## 2024-12-14 RX ADMIN — TAMSULOSIN HYDROCHLORIDE 0.4 MILLIGRAM(S): 0.4 CAPSULE ORAL at 00:35

## 2024-12-14 NOTE — PHYSICAL THERAPY INITIAL EVALUATION ADULT - CRITERIA FOR SKILLED THERAPEUTIC INTERVENTIONS
AR or home with 24/7 assistance and hands on assistance for all mobility needs./impairments found/functional limitations in following categories/anticipated discharge recommendation

## 2024-12-14 NOTE — PHYSICAL THERAPY INITIAL EVALUATION ADULT - ADDITIONAL COMMENTS
Pt reports that he lives with his sons in a private home with 0 REJI and 0 interior stairs.  Pt reports that prior to this hospitalization he was at rehab and required assistance of 1 person for ambulation and for lower body dressing.

## 2024-12-14 NOTE — PHYSICAL THERAPY INITIAL EVALUATION ADULT - PATIENT PROFILE REVIEW, REHAB EVAL
Show Applicator Variable?: Yes Duration Of Freeze Thaw-Cycle (Seconds): 0 Post-Care Instructions: I reviewed with the patient in detail post-care instructions. Patient is to wear sunprotection, and avoid picking at any of the treated lesions. Pt may apply Vaseline to crusted or scabbing areas. Render Post-Care Instructions In Note?: no Number Of Freeze-Thaw Cycles: 3 freeze-thaw cycles Detail Level: Generalized Consent: The patient's consent was obtained including but not limited to risks of crusting, scabbing, blistering, scarring, darker or lighter pigmentary change, recurrence, incomplete removal and infection. Spray Paint Text: The liquid nitrogen was applied to the skin utilizing a spray paint frosting technique. Medical Necessity Information: It is in your best interest to select a reason for this procedure from the list below. All of these items fulfill various CMS LCD requirements except the new and changing color options. Detail Level: Detailed Medical Necessity Clause: This procedure was medically necessary because the lesions that were treated were: yes
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
## 2024-12-14 NOTE — PHYSICAL THERAPY INITIAL EVALUATION ADULT - PERSONAL SAFETY AND JUDGMENT, REHAB EVAL
pt pushes bilateral platform RW too far away from body during ambulation, requires frequent cues to correct and pt is unable to maintain the correction./impaired/at risk behaviors demonstrated

## 2024-12-14 NOTE — PHYSICAL THERAPY INITIAL EVALUATION ADULT - DIAGNOSIS, PT EVAL
Pt demonstrates functional limitations in transfers and ambulation due to decreased strength, balance, and weight bearing restrictions.

## 2024-12-14 NOTE — PHYSICAL THERAPY INITIAL EVALUATION ADULT - RANGE OF MOTION EXAMINATION, REHAB EVAL
RLE: ankle dorsiflexion limited to approximately 5 degrees, hip and knee ROM WFL./bilateral upper extremity ROM was WFL (within functional limits)/Left LE ROM was WFL (within functional limits)

## 2024-12-14 NOTE — PATIENT PROFILE ADULT - NSTRANSFEREYEGLASSESPAIRS_GEN_A_NUR
----- Message from Inga Ulirch RN sent at 3/8/2022  2:00 PM CST -----  Please coordinate same day U/S and Dr. Elizabeth appt.   Due around end of June  Thanks   Inga    
1 pair

## 2024-12-14 NOTE — PHYSICAL THERAPY INITIAL EVALUATION ADULT - GENERAL OBSERVATIONS, REHAB EVAL
Pt received reclining in bedside recliner, CBWR, wound vac intact. Pt agreeable to speak in Moroccan with PT, and pt agreeable to PT evaluation.

## 2024-12-14 NOTE — PATIENT PROFILE ADULT - FUNCTIONAL ASSESSMENT - BASIC MOBILITY 6.
Not able to assess (calculate score using AMPAC averaging method)  3-calculated by average/Not able to assess (calculate score using [facility] averaging method)

## 2024-12-14 NOTE — PHYSICAL THERAPY INITIAL EVALUATION ADULT - BALANCE TRAINING, PT EVAL
Pt will improve standing balance to fair - for increased safety during standing ADLs and ambulation.

## 2024-12-14 NOTE — PATIENT PROFILE ADULT - FALL HARM RISK - HARM RISK INTERVENTIONS

## 2024-12-14 NOTE — PHYSICAL THERAPY INITIAL EVALUATION ADULT - NSPTDISCHREC_GEN_A_CORE
AR or home with 24/7 assistance and hands on assistance for all mobility needs./Acute Inpatient Rehab

## 2024-12-15 LAB
ANION GAP SERPL CALC-SCNC: 12 MMOL/L — SIGNIFICANT CHANGE UP (ref 5–17)
BUN SERPL-MCNC: 6.3 MG/DL — LOW (ref 8–20)
CALCIUM SERPL-MCNC: 8.8 MG/DL — SIGNIFICANT CHANGE UP (ref 8.4–10.5)
CHLORIDE SERPL-SCNC: 107 MMOL/L — SIGNIFICANT CHANGE UP (ref 96–108)
CO2 SERPL-SCNC: 26 MMOL/L — SIGNIFICANT CHANGE UP (ref 22–29)
CREAT SERPL-MCNC: 1.08 MG/DL — SIGNIFICANT CHANGE UP (ref 0.5–1.3)
EGFR: 75 ML/MIN/1.73M2 — SIGNIFICANT CHANGE UP
GLUCOSE SERPL-MCNC: 95 MG/DL — SIGNIFICANT CHANGE UP (ref 70–99)
HCT VFR BLD CALC: 30.5 % — LOW (ref 39–50)
HGB BLD-MCNC: 9.4 G/DL — LOW (ref 13–17)
MAGNESIUM SERPL-MCNC: 1.7 MG/DL — SIGNIFICANT CHANGE UP (ref 1.6–2.6)
MCHC RBC-ENTMCNC: 27.1 PG — SIGNIFICANT CHANGE UP (ref 27–34)
MCHC RBC-ENTMCNC: 30.8 G/DL — LOW (ref 32–36)
MCV RBC AUTO: 87.9 FL — SIGNIFICANT CHANGE UP (ref 80–100)
PLATELET # BLD AUTO: 631 K/UL — HIGH (ref 150–400)
POTASSIUM SERPL-MCNC: 3.8 MMOL/L — SIGNIFICANT CHANGE UP (ref 3.5–5.3)
POTASSIUM SERPL-SCNC: 3.8 MMOL/L — SIGNIFICANT CHANGE UP (ref 3.5–5.3)
RBC # BLD: 3.47 M/UL — LOW (ref 4.2–5.8)
RBC # FLD: 14.9 % — HIGH (ref 10.3–14.5)
SODIUM SERPL-SCNC: 145 MMOL/L — SIGNIFICANT CHANGE UP (ref 135–145)
WBC # BLD: 9.07 K/UL — SIGNIFICANT CHANGE UP (ref 3.8–10.5)
WBC # FLD AUTO: 9.07 K/UL — SIGNIFICANT CHANGE UP (ref 3.8–10.5)

## 2024-12-15 PROCEDURE — 99232 SBSQ HOSP IP/OBS MODERATE 35: CPT

## 2024-12-15 RX ADMIN — APIXABAN 10 MILLIGRAM(S): 2.5 TABLET, FILM COATED ORAL at 18:15

## 2024-12-15 RX ADMIN — LIDOCAINE 1 PATCH: 40 CREAM TOPICAL at 20:00

## 2024-12-15 RX ADMIN — FAMOTIDINE 20 MILLIGRAM(S): 20 TABLET, FILM COATED ORAL at 08:31

## 2024-12-15 RX ADMIN — APIXABAN 10 MILLIGRAM(S): 2.5 TABLET, FILM COATED ORAL at 05:32

## 2024-12-15 RX ADMIN — LIDOCAINE 1 PATCH: 40 CREAM TOPICAL at 19:27

## 2024-12-15 RX ADMIN — MONTELUKAST SODIUM 10 MILLIGRAM(S): 10 TABLET ORAL at 08:30

## 2024-12-15 RX ADMIN — TAMSULOSIN HYDROCHLORIDE 0.4 MILLIGRAM(S): 0.4 CAPSULE ORAL at 21:25

## 2024-12-15 RX ADMIN — ACETAMINOPHEN 500MG 650 MILLIGRAM(S): 500 TABLET, COATED ORAL at 06:05

## 2024-12-15 RX ADMIN — LIDOCAINE 1 PATCH: 40 CREAM TOPICAL at 08:28

## 2024-12-15 RX ADMIN — ACETAMINOPHEN 500MG 650 MILLIGRAM(S): 500 TABLET, COATED ORAL at 05:32

## 2024-12-15 RX ADMIN — VALSARTAN 80 MILLIGRAM(S): 320 TABLET ORAL at 05:32

## 2024-12-16 LAB
ANION GAP SERPL CALC-SCNC: 13 MMOL/L — SIGNIFICANT CHANGE UP (ref 5–17)
BASOPHILS # BLD AUTO: 0.08 K/UL — SIGNIFICANT CHANGE UP (ref 0–0.2)
BASOPHILS NFR BLD AUTO: 1.1 % — SIGNIFICANT CHANGE UP (ref 0–2)
BUN SERPL-MCNC: 6.5 MG/DL — LOW (ref 8–20)
CALCIUM SERPL-MCNC: 8.6 MG/DL — SIGNIFICANT CHANGE UP (ref 8.4–10.5)
CHLORIDE SERPL-SCNC: 107 MMOL/L — SIGNIFICANT CHANGE UP (ref 96–108)
CO2 SERPL-SCNC: 27 MMOL/L — SIGNIFICANT CHANGE UP (ref 22–29)
CREAT SERPL-MCNC: 1.13 MG/DL — SIGNIFICANT CHANGE UP (ref 0.5–1.3)
EGFR: 71 ML/MIN/1.73M2 — SIGNIFICANT CHANGE UP
EOSINOPHIL # BLD AUTO: 0.28 K/UL — SIGNIFICANT CHANGE UP (ref 0–0.5)
EOSINOPHIL NFR BLD AUTO: 3.8 % — SIGNIFICANT CHANGE UP (ref 0–6)
GLUCOSE SERPL-MCNC: 90 MG/DL — SIGNIFICANT CHANGE UP (ref 70–99)
HCT VFR BLD CALC: 29.7 % — LOW (ref 39–50)
HGB BLD-MCNC: 9.3 G/DL — LOW (ref 13–17)
IMM GRANULOCYTES NFR BLD AUTO: 0.8 % — SIGNIFICANT CHANGE UP (ref 0–0.9)
LYMPHOCYTES # BLD AUTO: 2.03 K/UL — SIGNIFICANT CHANGE UP (ref 1–3.3)
LYMPHOCYTES # BLD AUTO: 27.7 % — SIGNIFICANT CHANGE UP (ref 13–44)
MAGNESIUM SERPL-MCNC: 1.7 MG/DL — LOW (ref 1.8–2.6)
MCHC RBC-ENTMCNC: 27.4 PG — SIGNIFICANT CHANGE UP (ref 27–34)
MCHC RBC-ENTMCNC: 31.3 G/DL — LOW (ref 32–36)
MCV RBC AUTO: 87.4 FL — SIGNIFICANT CHANGE UP (ref 80–100)
MONOCYTES # BLD AUTO: 0.66 K/UL — SIGNIFICANT CHANGE UP (ref 0–0.9)
MONOCYTES NFR BLD AUTO: 9 % — SIGNIFICANT CHANGE UP (ref 2–14)
NEUTROPHILS # BLD AUTO: 4.23 K/UL — SIGNIFICANT CHANGE UP (ref 1.8–7.4)
NEUTROPHILS NFR BLD AUTO: 57.6 % — SIGNIFICANT CHANGE UP (ref 43–77)
PLATELET # BLD AUTO: 667 K/UL — HIGH (ref 150–400)
POTASSIUM SERPL-MCNC: 3.4 MMOL/L — LOW (ref 3.5–5.3)
POTASSIUM SERPL-SCNC: 3.4 MMOL/L — LOW (ref 3.5–5.3)
RBC # BLD: 3.4 M/UL — LOW (ref 4.2–5.8)
RBC # FLD: 14.6 % — HIGH (ref 10.3–14.5)
SODIUM SERPL-SCNC: 147 MMOL/L — HIGH (ref 135–145)
WBC # BLD: 7.34 K/UL — SIGNIFICANT CHANGE UP (ref 3.8–10.5)
WBC # FLD AUTO: 7.34 K/UL — SIGNIFICANT CHANGE UP (ref 3.8–10.5)

## 2024-12-16 PROCEDURE — 99223 1ST HOSP IP/OBS HIGH 75: CPT

## 2024-12-16 PROCEDURE — 99222 1ST HOSP IP/OBS MODERATE 55: CPT

## 2024-12-16 PROCEDURE — 99232 SBSQ HOSP IP/OBS MODERATE 35: CPT

## 2024-12-16 RX ORDER — APIXABAN 2.5 MG/1
1 TABLET, FILM COATED ORAL
Qty: 0 | Refills: 0 | DISCHARGE
Start: 2024-12-16

## 2024-12-16 RX ORDER — POTASSIUM CHLORIDE 600 MG/1
40 TABLET, EXTENDED RELEASE ORAL ONCE
Refills: 0 | Status: COMPLETED | OUTPATIENT
Start: 2024-12-16 | End: 2024-12-16

## 2024-12-16 RX ADMIN — VALSARTAN 80 MILLIGRAM(S): 320 TABLET ORAL at 05:58

## 2024-12-16 RX ADMIN — LIDOCAINE 1 PATCH: 40 CREAM TOPICAL at 19:20

## 2024-12-16 RX ADMIN — POTASSIUM CHLORIDE 40 MILLIEQUIVALENT(S): 600 TABLET, EXTENDED RELEASE ORAL at 18:10

## 2024-12-16 RX ADMIN — LIDOCAINE 1 PATCH: 40 CREAM TOPICAL at 13:06

## 2024-12-16 RX ADMIN — APIXABAN 5 MILLIGRAM(S): 2.5 TABLET, FILM COATED ORAL at 18:10

## 2024-12-16 RX ADMIN — MONTELUKAST SODIUM 10 MILLIGRAM(S): 10 TABLET ORAL at 13:07

## 2024-12-16 RX ADMIN — APIXABAN 5 MILLIGRAM(S): 2.5 TABLET, FILM COATED ORAL at 05:58

## 2024-12-16 RX ADMIN — Medication 100 GRAM(S): at 10:54
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
## 2024-12-16 NOTE — PROGRESS NOTE ADULT - TIME BILLING
reviewing the chart documentation, reviewing labs and imaging studies, evaluating the patient, discussing the plan of care with the consultants & medical team, and documenting.
Time spent reviewing the chart documentation, reviewing labs and imaging studies, evaluating the patient, discussing the plan of care with the medical team and/or all necessary consultants, and documenting.
reviewing the chart documentation, reviewing labs and imaging studies, evaluating the patient, discussing the plan of care with the consultants & medical team, and documenting.

## 2024-12-17 ENCOUNTER — TRANSCRIPTION ENCOUNTER (OUTPATIENT)
Age: 69
End: 2024-12-17

## 2024-12-17 VITALS
DIASTOLIC BLOOD PRESSURE: 75 MMHG | TEMPERATURE: 98 F | RESPIRATION RATE: 18 BRPM | SYSTOLIC BLOOD PRESSURE: 143 MMHG | HEART RATE: 83 BPM | OXYGEN SATURATION: 96 %

## 2024-12-17 PROCEDURE — 99233 SBSQ HOSP IP/OBS HIGH 50: CPT

## 2024-12-17 PROCEDURE — 80048 BASIC METABOLIC PNL TOTAL CA: CPT

## 2024-12-17 PROCEDURE — T1013: CPT

## 2024-12-17 PROCEDURE — 83735 ASSAY OF MAGNESIUM: CPT

## 2024-12-17 PROCEDURE — 97163 PT EVAL HIGH COMPLEX 45 MIN: CPT

## 2024-12-17 PROCEDURE — 80053 COMPREHEN METABOLIC PANEL: CPT

## 2024-12-17 PROCEDURE — 36415 COLL VENOUS BLD VENIPUNCTURE: CPT

## 2024-12-17 PROCEDURE — 85025 COMPLETE CBC W/AUTO DIFF WBC: CPT

## 2024-12-17 PROCEDURE — 99239 HOSP IP/OBS DSCHRG MGMT >30: CPT

## 2024-12-17 PROCEDURE — 85027 COMPLETE CBC AUTOMATED: CPT

## 2024-12-17 RX ORDER — SIMETHICONE 125 MG
1 CAPSULE ORAL
Qty: 60 | Refills: 0
Start: 2024-12-17 | End: 2024-12-31

## 2024-12-17 RX ORDER — APIXABAN 2.5 MG/1
1 TABLET, FILM COATED ORAL
Qty: 60 | Refills: 0
Start: 2024-12-17 | End: 2025-01-15

## 2024-12-17 RX ORDER — POLYETHYLENE GLYCOL 3350 17 G/17G
17 POWDER, FOR SOLUTION ORAL
Qty: 510 | Refills: 0
Start: 2024-12-17 | End: 2025-01-15

## 2024-12-17 RX ORDER — TAMSULOSIN HYDROCHLORIDE 0.4 MG/1
1 CAPSULE ORAL
Qty: 30 | Refills: 0
Start: 2024-12-17 | End: 2025-01-15

## 2024-12-17 RX ORDER — VALSARTAN 320 MG/1
1 TABLET ORAL
Qty: 30 | Refills: 0
Start: 2024-12-17 | End: 2025-01-15

## 2024-12-17 RX ORDER — LIDOCAINE 40 MG/G
1 CREAM TOPICAL
Qty: 30 | Refills: 0
Start: 2024-12-17 | End: 2025-01-15

## 2024-12-17 RX ORDER — OXYCODONE HYDROCHLORIDE 30 MG/1
1 TABLET ORAL
Qty: 42 | Refills: 0
Start: 2024-12-17 | End: 2024-12-23

## 2024-12-17 RX ORDER — FAMOTIDINE 20 MG/1
1 TABLET, FILM COATED ORAL
Qty: 30 | Refills: 0
Start: 2024-12-17 | End: 2025-01-15

## 2024-12-17 RX ORDER — MONTELUKAST SODIUM 10 MG/1
1 TABLET ORAL
Refills: 0 | DISCHARGE

## 2024-12-17 RX ORDER — ACETAMINOPHEN 500MG 500 MG/1
2 TABLET, COATED ORAL
Qty: 0 | Refills: 0 | DISCHARGE
Start: 2024-12-17

## 2024-12-17 RX ORDER — NALOXONE HCL 0.4 MG/ML
1 AMPUL (ML) INJECTION
Qty: 1 | Refills: 0
Start: 2024-12-17 | End: 2024-12-17

## 2024-12-17 RX ORDER — SENNOSIDES 8.6 MG
2 TABLET ORAL
Qty: 60 | Refills: 0
Start: 2024-12-17 | End: 2025-01-15

## 2024-12-17 RX ORDER — MONTELUKAST SODIUM 10 MG/1
1 TABLET ORAL
Qty: 30 | Refills: 0
Start: 2024-12-17 | End: 2025-01-15

## 2024-12-17 RX ADMIN — POLYETHYLENE GLYCOL 3350 17 GRAM(S): 17 POWDER, FOR SOLUTION ORAL at 11:12

## 2024-12-17 RX ADMIN — LIDOCAINE 1 PATCH: 40 CREAM TOPICAL at 11:11

## 2024-12-17 RX ADMIN — VALSARTAN 80 MILLIGRAM(S): 320 TABLET ORAL at 05:23

## 2024-12-17 RX ADMIN — FAMOTIDINE 20 MILLIGRAM(S): 20 TABLET, FILM COATED ORAL at 11:12

## 2024-12-17 RX ADMIN — MONTELUKAST SODIUM 10 MILLIGRAM(S): 10 TABLET ORAL at 11:12

## 2024-12-17 RX ADMIN — APIXABAN 5 MILLIGRAM(S): 2.5 TABLET, FILM COATED ORAL at 05:23

## 2024-12-17 RX ADMIN — LIDOCAINE 1 PATCH: 40 CREAM TOPICAL at 01:20

## 2024-12-17 NOTE — DISCHARGE NOTE PROVIDER - NSDCMRMEDTOKEN_GEN_ALL_CORE_FT
acetaminophen 325 mg oral tablet: 2 tab(s) orally every 6 hours As needed Temp greater or equal to 38C (100.4F), Mild Pain (1 - 3)  Diovan 80 mg oral tablet: 1 tab(s) orally once a day  Eliquis 5 mg oral tablet: 1 tab(s) orally 2 times a day  lidocaine 4% topical film: Apply topically to affected area once a day  montelukast 10 mg oral tablet: 1 tab(s) orally once a day  Narcan 4 mg/0.1 mL nasal spray: 1 spray(s) intranasally once a day  oxyCODONE 5 mg oral tablet: 1 tab(s) orally every 4 hours as needed for Moderate Pain (4 - 6) MDD: mdd 6tabs  Pepcid 20 mg oral tablet: 1 tab(s) orally once a day  polyethylene glycol 3350 oral powder for reconstitution: 17 gram(s) orally once a day as needed for  constipation  senna leaf extract oral tablet: 2 tab(s) orally once a day (at bedtime)  simethicone 80 mg oral tablet, chewable: 1 tab(s) orally 4 times a day as needed for Upset Stomach  tamsulosin 0.4 mg oral capsule: 1 cap(s) orally once a day (at bedtime)

## 2024-12-17 NOTE — DISCHARGE NOTE PROVIDER - PROVIDER TOKENS
PROVIDER:[TOKEN:[[id number]],ESTABLISHEDPATIENT:[T]],PROVIDER:[TOKEN:[[id number]],ESTABLISHEDPATIENT:[T]],FREE:[LAST:[primary care physician],PHONE:[(   )    -],FAX:[(   )    -]] PROVIDER:[TOKEN:[[id number]],ESTABLISHEDPATIENT:[T]],PROVIDER:[TOKEN:[[id number]],ESTABLISHEDPATIENT:[T]],PROVIDER:[TOKEN:[[id number]],ESTABLISHEDPATIENT:[T]],FREE:[LAST:[primary care physician],PHONE:[(   )    -],FAX:[(   )    -]],PROVIDER:[TOKEN:[[id number]],ESTABLISHEDPATIENT:[T]]
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
## 2024-12-17 NOTE — DISCHARGE NOTE NURSING/CASE MANAGEMENT/SOCIAL WORK - PATIENT PORTAL LINK FT
You can access the FollowMyHealth Patient Portal offered by [facility] by registering at the following website: [email or web address]. By joining [facility]’s FollowMyHealth portal, you will also be able to view your health information using other applications (apps) compatible with our system.

## 2024-12-17 NOTE — DISCHARGE NOTE PROVIDER - NSFOLLOWUPCLINICS_GEN_ALL_ED_FT
[facility]  Hematology/Oncology  [address] [place] [address]  Phone: [phone]  Fax:      [facility]  Hematology/Oncology  [address] [place] [address]  Phone: [phone]  Fax:     [facility]  Wound Care  [address] [place] [address]  Phone: [phone]  Fax: [phone]

## 2024-12-17 NOTE — DISCHARGE NOTE PROVIDER - ATTENDING DISCHARGE PHYSICAL EXAMINATION:
CONSTITUTIONAL: No apparent distress  HEENT: Normocephalic, Atraumatic.   RESPIRATORY:  lungs are clear to auscultation bilaterally, No crackles, rhonchi, wheezes  CARDIOVASCULAR: Regular rate and rhythm  EXT: No lower extremity edema, R wound covered   ABDOMEN: Soft, non-distended, nontender to palpation, +BS  MUSCLOSKELETAL: moving all 4 extremities spontaneously  PSYCH: thoughts linear, affect appropriate  NEUROLOGY: Alert, Oriented x3
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
## 2024-12-17 NOTE — DISCHARGE NOTE PROVIDER - CARE PROVIDERS DIRECT ADDRESSES
[email or web address],[email or web address],DirectAddress_Unknown,[email or web address]

## 2024-12-17 NOTE — DISCHARGE NOTE NURSING/CASE MANAGEMENT/SOCIAL WORK - FINANCIAL ASSISTANCE
[facility] provides services at a reduced cost to those who are determined to be eligible through [facility]’s financial assistance program. Information regarding [facility]’s financial assistance program can be found by going to [email or web address] or by calling [phone].

## 2024-12-17 NOTE — DISCHARGE NOTE PROVIDER - CARE PROVIDER_API CALL
Grayson Mckeonul  Neurosurgery  [address] [place] [id number]  Phone: [phone]  Fax: [phone]  Established Patient  Follow Up Time:     Jass Leung  Orthopaedic Trauma  [address] [place] [address]  Phone: [phone]  Fax: [phone]  Established Patient  Follow Up Time:     primary care physician,   Phone: (   )    -  Fax: (   )    -  Follow Up Time:    Grayson Mckeonul  Neurosurgery  [address] [place] [id number]  Phone: [phone]  Fax: [phone]  Established Patient  Follow Up Time:     Jass Leung  Orthopaedic Trauma  [address] [place] [address]  Phone: [phone]  Fax: [phone]  Established Patient  Follow Up Time:     Maria T Briggs  Plastic Surgery  [address] [place] [address]  Phone: [phone]  Fax: [phone]  Established Patient  Follow Up Time:     primary care physician,   Phone: (   )    -  Fax: (   )    -  Follow Up Time:     Roxie Diamond  Interventional Cardiology  [address] [place] [address]  Phone: [phone]  Fax: [phone]  Established Patient  Follow Up Time:
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
## 2024-12-23 PROBLEM — I26.99 OTHER PULMONARY EMBOLISM WITHOUT ACUTE COR PULMONALE: Chronic | Status: ACTIVE | Noted: 2024-12-13

## 2024-12-25 NOTE — ED ADULT NURSE NOTE - NS PRO PASSIVE SMOKE EXP
----- Message from Pooja ALVARADO sent at 12/25/2024 10:50 AM CST -----  Regarding: TC Med Management  Transition Clinic Referral   [place]       Please Check Type of Referral Requested:       ____THERAPY: The Transition clinic is able to schedule patients without current medical insurance; these patient will be referred to our Social Work Care Coordinator for Medical Insurance              Assistance. We are open for referral for psychotherapy. Patient is referred from: Extended Care      __X__MEDICATION:   Referrals for Medication are ONLY accepted from the following areas (select): Emergency Department/Urgent Care - HIGH PRIORITY                                      Suboxone and Opioid Management Referrals are automatically denied.   [facility] Psychiatry cannot see patient without active medical insurance.   Next level of psychiatry care must be within 12 months.  TC Psychiat[facility] cannot accept patient with next level of care scheduled with PCP.  The transition clinic cannot follow patients who are on a restricted recipient program.    __ Long-Acting Injection (ZHANG)?    Is referred patient receiving a long-acting injection (ZHANG)?  If YES, provide the following:    Name and dose of Medication:   Date Injection was last administered to patient:   Next Long- Acting injection Due Date:     Is referred patient transferring from [facility]?  If YES, provide the following:     ___  ZHANG will be receiving ZHANG at [facility] [place]  ___  ZHANG will be administered per an IRTS or elsewhere in the community      GUARDIAN: If your patient is not their own Guardian, please provide the following:    Guardian Name:  Guardian Contact Information (Phone & Email) :  Guardian Address:     FOSTER CARE PROVIDER: If your patient lives at a Licensed Foster Care, please provide the following:    Foster Provider:  Foster Provider Contact Information (Phone & Email):  Foster Provider Address:     Referring Provider  Contact Name: Mohit Raphael; Phone Number: [phone]    Reason for Transition Clinic Referral: Pt was on obs waiting for psych consult and decided to leave prior to consult. Please call to offer psychiatry support.    Next Level of Care Patient Will Be Transitioned To: k  Provider(s)  Location   Date/Time     What Would Be Helpful from the Transition Clinic: Offer psychiatry support    Requesting to schedule in collaboration with patient (via Teams): No      Needs:NO    Does Patient Have Access to Technology: [facility]    Patient E-mail Address: [email or web address]    Current Patient Phone Number: [phone];     Clinician Gender Preference (if applicable): [facility]    Patient location preference: Please ask patient    KIRSTEN JOYA    (Master Form: Updated 11/28/2023)   Unknown

## 2025-01-03 PROBLEM — Z00.00 ENCOUNTER FOR PREVENTIVE HEALTH EXAMINATION: Status: ACTIVE | Noted: 2025-01-03

## 2025-01-08 ENCOUNTER — APPOINTMENT (OUTPATIENT)
Dept: CARDIOLOGY | Facility: CLINIC | Age: 70
End: 2025-01-08

## 2025-01-08 RX ORDER — TAMSULOSIN HYDROCHLORIDE 0.4 MG/1
0.4 CAPSULE ORAL
Qty: 90 | Refills: 3 | Status: ACTIVE | COMMUNITY

## 2025-01-08 RX ORDER — MONTELUKAST 10 MG/1
10 TABLET, FILM COATED ORAL DAILY
Refills: 0 | Status: ACTIVE | COMMUNITY

## 2025-01-08 RX ORDER — APIXABAN 5 MG/1
5 TABLET, FILM COATED ORAL
Qty: 60 | Refills: 5 | Status: ACTIVE | COMMUNITY

## 2025-01-08 RX ORDER — FAMOTIDINE 20 MG/1
20 TABLET, FILM COATED ORAL DAILY
Refills: 0 | Status: ACTIVE | COMMUNITY

## 2025-01-08 RX ORDER — NALOXONE HCL 0.4 MG/ML
0.4 AMPUL (ML) INJECTION
Refills: 0 | Status: ACTIVE | COMMUNITY

## 2025-01-08 RX ORDER — VALSARTAN 80 MG/1
80 TABLET ORAL DAILY
Refills: 0 | Status: ACTIVE | COMMUNITY

## 2025-01-31 NOTE — H&P ADULT - NSVTERISKREFERASSESS_GEN_ALL_CORE
Refer to the Assessment tab to view/cancel completed assessment. F: tolerating oral  E: replete PRN Mg <2, K > 4  DVT:   N:   GI: none  Code Status: full code Afib w/ rvr seen on EKG. Home med: metoprolol 12.5 BID   Today, HR ranging . responding to lopressor 5. R/o retention, constipation, pain    Plan  -c/w home metoprolol, will convert to IV as pt obtunded---> 5mg q8h lopressor IVP  -bladder scans q6h   -consider john (currently on primafit) if pt retaining   -last BM yesterday, make sure pt having BMs  -pain control with dilaudid 0.5 q4h PRN

## 2025-06-30 NOTE — ED ADULT TRIAGE NOTE - HEIGHT IN CM
Dorinda Chavarria is here for followup after right carpal tunnel surgery. Pain is controlled with current analgesics.  Medication(s) being used: Norco and Celebrex.. The patient notes improvement in the following symptoms:strength, numbness, pain, sensation.  The patient denies fever, wound drainage, increasing redness, pus, increasing pain, increasing swelling. Post op problems reported: none.  She stated her pain has been bad since surgery. She stated the numbness/tingling is improved.       Objective:         General :    alert, appears stated age, and cooperative   Sutures:   Sutures out.   Incision:  healing well, no significant drainage, no dehiscence, no significant erythema   Tenderness:  none   Flexion ROM:  full range of motion   Extension ROM:  full range of motion   Effusion:  mild         Assessment:        Status post right carpal tunnel surgery. Doing well postoperatively.        Plan:     Neurontin 100 tid  HEP  Follow up: 2 months.     
170